# Patient Record
Sex: FEMALE | Race: WHITE | NOT HISPANIC OR LATINO | Employment: UNEMPLOYED | URBAN - NONMETROPOLITAN AREA
[De-identification: names, ages, dates, MRNs, and addresses within clinical notes are randomized per-mention and may not be internally consistent; named-entity substitution may affect disease eponyms.]

---

## 2021-02-05 ENCOUNTER — IMMUNIZATION (OUTPATIENT)
Dept: VACCINE CLINIC | Facility: HOSPITAL | Age: 81
End: 2021-02-05

## 2021-02-05 PROCEDURE — 91300 HC SARSCOV02 VAC 30MCG/0.3ML IM: CPT | Performed by: INTERNAL MEDICINE

## 2021-02-05 PROCEDURE — 0001A: CPT | Performed by: INTERNAL MEDICINE

## 2021-02-26 ENCOUNTER — IMMUNIZATION (OUTPATIENT)
Dept: VACCINE CLINIC | Facility: HOSPITAL | Age: 81
End: 2021-02-26

## 2021-02-26 PROCEDURE — 91300 HC SARSCOV02 VAC 30MCG/0.3ML IM: CPT | Performed by: INTERNAL MEDICINE

## 2021-02-26 PROCEDURE — 0002A: CPT | Performed by: INTERNAL MEDICINE

## 2022-01-19 ENCOUNTER — TRANSCRIBE ORDERS (OUTPATIENT)
Dept: ADMINISTRATIVE | Facility: HOSPITAL | Age: 82
End: 2022-01-19

## 2022-01-19 DIAGNOSIS — R10.32 ABDOMINAL PAIN, LEFT LOWER QUADRANT: Primary | ICD-10-CM

## 2022-01-27 ENCOUNTER — HOSPITAL ENCOUNTER (OUTPATIENT)
Dept: GENERAL RADIOLOGY | Facility: HOSPITAL | Age: 82
Discharge: HOME | End: 2022-01-27
Admitting: SURGERY

## 2022-01-27 DIAGNOSIS — R10.32 ABDOMINAL PAIN, LEFT LOWER QUADRANT: ICD-10-CM

## 2022-01-27 PROCEDURE — 74270 X-RAY XM COLON 1CNTRST STD: CPT

## 2022-01-27 PROCEDURE — 74270 X-RAY XM COLON 1CNTRST STD: CPT | Performed by: RADIOLOGY

## 2024-04-04 ENCOUNTER — APPOINTMENT (OUTPATIENT)
Dept: GENERAL RADIOLOGY | Facility: HOSPITAL | Age: 84
End: 2024-04-04
Payer: MEDICARE

## 2024-04-04 ENCOUNTER — HOSPITAL ENCOUNTER (EMERGENCY)
Facility: HOSPITAL | Age: 84
Discharge: HOME | End: 2024-04-05
Attending: EMERGENCY MEDICINE
Payer: MEDICARE

## 2024-04-04 DIAGNOSIS — F02.C0 SEVERE ALZHEIMER'S DEMENTIA, UNSPECIFIED TIMING OF DEMENTIA ONSET, UNSPECIFIED WHETHER BEHAVIORAL, PSYCHOTIC, OR MOOD DISTURBANCE OR ANXIETY: Primary | ICD-10-CM

## 2024-04-04 DIAGNOSIS — G30.9 SEVERE ALZHEIMER'S DEMENTIA, UNSPECIFIED TIMING OF DEMENTIA ONSET, UNSPECIFIED WHETHER BEHAVIORAL, PSYCHOTIC, OR MOOD DISTURBANCE OR ANXIETY: Primary | ICD-10-CM

## 2024-04-04 LAB
A-A DO2: 23.8 MMHG (ref 0–300)
ARTERIAL PATENCY WRIST A: ABNORMAL
ATMOSPHERIC PRESS: 724 MMHG
BASE EXCESS BLDA CALC-SCNC: 2.1 MMOL/L (ref 0–2)
BDY SITE: ABNORMAL
CO2 BLDA-SCNC: 27.4 MMOL/L (ref 22–33)
COHGB MFR BLD: 1.3 % (ref 0–5)
HCO3 BLDA-SCNC: 26.2 MMOL/L (ref 20–26)
HCT VFR BLD CALC: 44.4 % (ref 38–51)
HGB BLDA-MCNC: 14.5 G/DL (ref 13.5–17.5)
INHALED O2 CONCENTRATION: 21 %
Lab: ABNORMAL
METHGB BLD QL: 0.2 % (ref 0–3)
MODALITY: ABNORMAL
OXYHGB MFR BLDV: 95 % (ref 94–99)
PCO2 BLDA: 38.6 MM HG (ref 35–45)
PCO2 TEMP ADJ BLD: ABNORMAL MM[HG]
PH BLDA: 7.44 PH UNITS (ref 7.35–7.45)
PH, TEMP CORRECTED: ABNORMAL
PO2 BLDA: 74.8 MM HG (ref 83–108)
PO2 TEMP ADJ BLD: ABNORMAL MM[HG]
SAO2 % BLDCOA: 96.4 % (ref 94–99)
VENTILATOR MODE: ABNORMAL

## 2024-04-04 PROCEDURE — 82805 BLOOD GASES W/O2 SATURATION: CPT

## 2024-04-04 PROCEDURE — 83735 ASSAY OF MAGNESIUM: CPT | Performed by: EMERGENCY MEDICINE

## 2024-04-04 PROCEDURE — 93005 ELECTROCARDIOGRAM TRACING: CPT | Performed by: EMERGENCY MEDICINE

## 2024-04-04 PROCEDURE — 99285 EMERGENCY DEPT VISIT HI MDM: CPT

## 2024-04-04 PROCEDURE — 84484 ASSAY OF TROPONIN QUANT: CPT | Performed by: EMERGENCY MEDICINE

## 2024-04-04 PROCEDURE — 83605 ASSAY OF LACTIC ACID: CPT | Performed by: EMERGENCY MEDICINE

## 2024-04-04 PROCEDURE — 36600 WITHDRAWAL OF ARTERIAL BLOOD: CPT

## 2024-04-04 PROCEDURE — 71045 X-RAY EXAM CHEST 1 VIEW: CPT

## 2024-04-04 PROCEDURE — 85025 COMPLETE CBC W/AUTO DIFF WBC: CPT | Performed by: EMERGENCY MEDICINE

## 2024-04-04 PROCEDURE — 84145 PROCALCITONIN (PCT): CPT | Performed by: EMERGENCY MEDICINE

## 2024-04-04 PROCEDURE — 86140 C-REACTIVE PROTEIN: CPT | Performed by: EMERGENCY MEDICINE

## 2024-04-04 PROCEDURE — 82375 ASSAY CARBOXYHB QUANT: CPT

## 2024-04-04 PROCEDURE — 84443 ASSAY THYROID STIM HORMONE: CPT | Performed by: EMERGENCY MEDICINE

## 2024-04-04 PROCEDURE — 80053 COMPREHEN METABOLIC PANEL: CPT | Performed by: EMERGENCY MEDICINE

## 2024-04-04 PROCEDURE — 83050 HGB METHEMOGLOBIN QUAN: CPT

## 2024-04-04 RX ORDER — SODIUM CHLORIDE 0.9 % (FLUSH) 0.9 %
10 SYRINGE (ML) INJECTION AS NEEDED
Status: DISCONTINUED | OUTPATIENT
Start: 2024-04-04 | End: 2024-04-05 | Stop reason: HOSPADM

## 2024-04-05 ENCOUNTER — APPOINTMENT (OUTPATIENT)
Dept: CT IMAGING | Facility: HOSPITAL | Age: 84
End: 2024-04-05
Payer: MEDICARE

## 2024-04-05 VITALS
HEIGHT: 65 IN | WEIGHT: 130 LBS | BODY MASS INDEX: 21.66 KG/M2 | TEMPERATURE: 98.3 F | RESPIRATION RATE: 16 BRPM | HEART RATE: 75 BPM | SYSTOLIC BLOOD PRESSURE: 103 MMHG | OXYGEN SATURATION: 90 % | DIASTOLIC BLOOD PRESSURE: 62 MMHG

## 2024-04-05 LAB
ALBUMIN SERPL-MCNC: 4.3 G/DL (ref 3.5–5.2)
ALBUMIN/GLOB SERPL: 1.5 G/DL
ALP SERPL-CCNC: 91 U/L (ref 39–117)
ALT SERPL W P-5'-P-CCNC: 9 U/L (ref 1–33)
AMMONIA BLD-SCNC: 22 UMOL/L (ref 11–51)
ANION GAP SERPL CALCULATED.3IONS-SCNC: 15.4 MMOL/L (ref 5–15)
AST SERPL-CCNC: 15 U/L (ref 1–32)
BASOPHILS # BLD AUTO: 0.05 10*3/MM3 (ref 0–0.2)
BASOPHILS NFR BLD AUTO: 0.5 % (ref 0–1.5)
BILIRUB SERPL-MCNC: 0.7 MG/DL (ref 0–1.2)
BILIRUB UR QL STRIP: NEGATIVE
BUN SERPL-MCNC: 21 MG/DL (ref 8–23)
BUN/CREAT SERPL: 22.3 (ref 7–25)
CALCIUM SPEC-SCNC: 9.6 MG/DL (ref 8.6–10.5)
CHLORIDE SERPL-SCNC: 102 MMOL/L (ref 98–107)
CLARITY UR: CLEAR
CO2 SERPL-SCNC: 24.6 MMOL/L (ref 22–29)
COLOR UR: YELLOW
CREAT SERPL-MCNC: 0.94 MG/DL (ref 0.57–1)
CRP SERPL-MCNC: 0.86 MG/DL (ref 0–0.5)
D-LACTATE SERPL-SCNC: 1 MMOL/L (ref 0.5–2)
DEPRECATED RDW RBC AUTO: 43.7 FL (ref 37–54)
EGFRCR SERPLBLD CKD-EPI 2021: 60.7 ML/MIN/1.73
EOSINOPHIL # BLD AUTO: 0.06 10*3/MM3 (ref 0–0.4)
EOSINOPHIL NFR BLD AUTO: 0.6 % (ref 0.3–6.2)
ERYTHROCYTE [DISTWIDTH] IN BLOOD BY AUTOMATED COUNT: 12.7 % (ref 12.3–15.4)
FLUAV RNA RESP QL NAA+PROBE: NOT DETECTED
FLUBV RNA RESP QL NAA+PROBE: NOT DETECTED
GEN 5 2HR TROPONIN T REFLEX: 6 NG/L
GLOBULIN UR ELPH-MCNC: 2.8 GM/DL
GLUCOSE BLDC GLUCOMTR-MCNC: 114 MG/DL (ref 70–130)
GLUCOSE SERPL-MCNC: 112 MG/DL (ref 65–99)
GLUCOSE UR STRIP-MCNC: NEGATIVE MG/DL
HCT VFR BLD AUTO: 43.5 % (ref 34–46.6)
HGB BLD-MCNC: 14.6 G/DL (ref 12–15.9)
HGB UR QL STRIP.AUTO: NEGATIVE
HOLD SPECIMEN: NORMAL
HOLD SPECIMEN: NORMAL
IMM GRANULOCYTES # BLD AUTO: 0.02 10*3/MM3 (ref 0–0.05)
IMM GRANULOCYTES NFR BLD AUTO: 0.2 % (ref 0–0.5)
KETONES UR QL STRIP: ABNORMAL
LEUKOCYTE ESTERASE UR QL STRIP.AUTO: NEGATIVE
LYMPHOCYTES # BLD AUTO: 1.94 10*3/MM3 (ref 0.7–3.1)
LYMPHOCYTES NFR BLD AUTO: 19.5 % (ref 19.6–45.3)
MAGNESIUM SERPL-MCNC: 2.4 MG/DL (ref 1.6–2.4)
MCH RBC QN AUTO: 31.7 PG (ref 26.6–33)
MCHC RBC AUTO-ENTMCNC: 33.6 G/DL (ref 31.5–35.7)
MCV RBC AUTO: 94.4 FL (ref 79–97)
MONOCYTES # BLD AUTO: 0.78 10*3/MM3 (ref 0.1–0.9)
MONOCYTES NFR BLD AUTO: 7.8 % (ref 5–12)
NEUTROPHILS NFR BLD AUTO: 7.09 10*3/MM3 (ref 1.7–7)
NEUTROPHILS NFR BLD AUTO: 71.4 % (ref 42.7–76)
NITRITE UR QL STRIP: NEGATIVE
NRBC BLD AUTO-RTO: 0 /100 WBC (ref 0–0.2)
PH UR STRIP.AUTO: 6 [PH] (ref 5–8)
PLATELET # BLD AUTO: 238 10*3/MM3 (ref 140–450)
PMV BLD AUTO: 8.7 FL (ref 6–12)
POTASSIUM SERPL-SCNC: 3.6 MMOL/L (ref 3.5–5.2)
PROCALCITONIN SERPL-MCNC: 0.06 NG/ML (ref 0–0.25)
PROT SERPL-MCNC: 7.1 G/DL (ref 6–8.5)
PROT UR QL STRIP: NEGATIVE
QT INTERVAL: 378 MS
QTC INTERVAL: 454 MS
RBC # BLD AUTO: 4.61 10*6/MM3 (ref 3.77–5.28)
SARS-COV-2 RNA RESP QL NAA+PROBE: NOT DETECTED
SODIUM SERPL-SCNC: 142 MMOL/L (ref 136–145)
SP GR UR STRIP: >1.03 (ref 1–1.03)
TROPONIN T DELTA: -1 NG/L
TROPONIN T SERPL HS-MCNC: 7 NG/L
TSH SERPL DL<=0.05 MIU/L-ACNC: 2.42 UIU/ML (ref 0.27–4.2)
UROBILINOGEN UR QL STRIP: ABNORMAL
WBC NRBC COR # BLD AUTO: 9.94 10*3/MM3 (ref 3.4–10.8)
WHOLE BLOOD HOLD COAG: NORMAL
WHOLE BLOOD HOLD SPECIMEN: NORMAL

## 2024-04-05 PROCEDURE — 93005 ELECTROCARDIOGRAM TRACING: CPT

## 2024-04-05 PROCEDURE — 71045 X-RAY EXAM CHEST 1 VIEW: CPT | Performed by: RADIOLOGY

## 2024-04-05 PROCEDURE — 82948 REAGENT STRIP/BLOOD GLUCOSE: CPT

## 2024-04-05 PROCEDURE — 70498 CT ANGIOGRAPHY NECK: CPT

## 2024-04-05 PROCEDURE — 82140 ASSAY OF AMMONIA: CPT | Performed by: EMERGENCY MEDICINE

## 2024-04-05 PROCEDURE — 87636 SARSCOV2 & INF A&B AMP PRB: CPT | Performed by: EMERGENCY MEDICINE

## 2024-04-05 PROCEDURE — P9612 CATHETERIZE FOR URINE SPEC: HCPCS

## 2024-04-05 PROCEDURE — 93010 ELECTROCARDIOGRAM REPORT: CPT | Performed by: SPECIALIST

## 2024-04-05 PROCEDURE — 87040 BLOOD CULTURE FOR BACTERIA: CPT | Performed by: EMERGENCY MEDICINE

## 2024-04-05 PROCEDURE — 36415 COLL VENOUS BLD VENIPUNCTURE: CPT

## 2024-04-05 PROCEDURE — 25810000003 SODIUM CHLORIDE 0.9 % SOLUTION: Performed by: EMERGENCY MEDICINE

## 2024-04-05 PROCEDURE — 84484 ASSAY OF TROPONIN QUANT: CPT | Performed by: EMERGENCY MEDICINE

## 2024-04-05 PROCEDURE — 70498 CT ANGIOGRAPHY NECK: CPT | Performed by: RADIOLOGY

## 2024-04-05 PROCEDURE — 70450 CT HEAD/BRAIN W/O DYE: CPT

## 2024-04-05 PROCEDURE — 25510000001 IOPAMIDOL PER 1 ML: Performed by: EMERGENCY MEDICINE

## 2024-04-05 PROCEDURE — 70496 CT ANGIOGRAPHY HEAD: CPT | Performed by: RADIOLOGY

## 2024-04-05 PROCEDURE — 0042T HC CT CEREBRAL PERFUSION W/WO CONTRAST: CPT

## 2024-04-05 PROCEDURE — 70496 CT ANGIOGRAPHY HEAD: CPT

## 2024-04-05 PROCEDURE — 0042T CT CEREBRAL PERFUSION W WO CONTRAST: CPT | Performed by: RADIOLOGY

## 2024-04-05 PROCEDURE — 81003 URINALYSIS AUTO W/O SCOPE: CPT | Performed by: EMERGENCY MEDICINE

## 2024-04-05 RX ADMIN — SODIUM CHLORIDE 1000 ML: 9 INJECTION, SOLUTION INTRAVENOUS at 03:49

## 2024-04-05 RX ADMIN — IOPAMIDOL 70 ML: 755 INJECTION, SOLUTION INTRAVENOUS at 02:05

## 2024-04-05 RX ADMIN — IOPAMIDOL 70 ML: 755 INJECTION, SOLUTION INTRAVENOUS at 02:04

## 2024-04-05 NOTE — ED PROVIDER NOTES
Subjective   History of Present Illness  Patient is an 83-year-old female who arrives by EMS, accompanied by her .  He states that she has severe Alzheimer's dementia, and that today she has been much more confused than usual.  By the time they arrived here the patient advises that she is almost completely back to her baseline mental status, which is still very confused.  He states that for a time today she would not talk to him or communicate with him in any way.  She did not lose consciousness.  She has not voiced any complaints to the .  He states that several years ago she was in a clinical trial for Alzheimer's disease at the [facility], she seemed to do well with this for a while but stopped benefiting from it and so she stopped participating in the trial.  Then about 1 to 1-1/2 years ago the  stopped giving her her medications, which consisted of Namenda and Exelon patch; he states that it became such a fight on a daily basis to get her to take her medications that he decided to stop doing so.  He denies any other symptoms or other complaints.      Review of Systems   All other systems reviewed and are negative.      No past medical history on file.    Allergies   Allergen Reactions    Sulfa Antibiotics Hives       No past surgical history on file.    No family history on file.    Social History     Socioeconomic History    Marital status:            Objective   Physical Exam  Vitals and nursing note reviewed.   Constitutional:       General: She is not in acute distress.     Appearance: Normal appearance. She is well-developed. She is not toxic-appearing or diaphoretic.      Comments: Elderly female, awake and alert.  She appears to be very demented.  She is oriented to her first name only.  She does not otherwise answer questions appropriately.  She is able to tell me that her name is Crista.   HENT:      Head: Normocephalic and atraumatic.   Eyes:      General: No  scleral icterus.     Pupils: Pupils are equal, round, and reactive to light.   Neck:      Trachea: No tracheal deviation.   Cardiovascular:      Rate and Rhythm: Normal rate and regular rhythm.   Pulmonary:      Effort: Pulmonary effort is normal. No respiratory distress.      Breath sounds: Normal breath sounds.   Chest:      Chest wall: No tenderness.   Abdominal:      General: Bowel sounds are normal.      Palpations: Abdomen is soft.      Tenderness: There is no abdominal tenderness. There is no guarding or rebound.   Musculoskeletal:         General: No tenderness. Normal range of motion.      Cervical back: Normal range of motion and neck supple. No rigidity or tenderness.      Right lower leg: No edema.      Left lower leg: No edema.   Skin:     General: Skin is warm and dry.      Capillary Refill: Capillary refill takes less than 2 seconds.      Coloration: Skin is not pale.   Neurological:      General: No focal deficit present.      Mental Status: She is alert.      GCS: GCS eye subscore is 4. GCS verbal subscore is 5. GCS motor subscore is 6.      Motor: No abnormal muscle tone.      Comments: Patient is not able to cooperate with detailed neurological examination, but grossly nonfocal.   Psychiatric:      Comments: Patient appears very demented         Procedures  CT CEREBRAL PERFUSION WITH & WITHOUT CONTRAST   Final Result       1.  No evidence of ischemia.   2.  Normal brain perfusion scan.       This report was finalized on 4/5/2024 2:43 AM by Eamon Omalley MD.          CT Angiogram Head   Final Result       1.  Normal CTA examination of the neck.   2.  Normal CTA examination of the brain with features of mild stenoses   identified at the distal right M1 MCA branch segments.   3.  No aneurysm or vascular malformation.   4.  Mild left dominant vertebral artery.   5.  No dissection.   6.  No large vessel occlusion.               This report was finalized on 4/5/2024 2:48 AM by Eamon Omalley MD.           CT Angiogram Carotids   Final Result       1.  Normal CTA examination of the neck.   2.  Normal CTA examination of the brain with features of mild stenoses   identified at the distal right M1 MCA branch segments.   3.  No aneurysm or vascular malformation.   4.  Mild left dominant vertebral artery.   5.  No dissection.   6.  No large vessel occlusion.               This report was finalized on 4/5/2024 2:48 AM by Eamon Omalley MD.          CT Head Without Contrast   Final Result       1.  Moderate generalized brain volume loss.   2.  No acute intracranial hemorrhage, mass, infarct, or edema.   3.  Mild mucosal thickening in the paranasal sinuses.   4.  Left-sided mastoiditis.   5.  No fracture or foreign body.       This report was finalized on 4/5/2024 1:54 AM by Eamon Omalley MD.          XR Chest 1 View   Final Result       1.  Normal heart size.   2.  No lobar consolidation or edema.    3.  No pleural effusion or pneumothorax   4.  Mild hypoinflated lungs.       This report was finalized on 4/5/2024 12:49 AM by Eamon Omalley MD.            Results for orders placed or performed during the hospital encounter of 04/04/24   COVID-19 and FLU A/B PCR, 1 HR TAT - Swab, Nasopharynx    Specimen: Nasopharynx; Swab   Result Value Ref Range    COVID19 Not Detected Not Detected - Ref. Range    Influenza A PCR Not Detected Not Detected    Influenza B PCR Not Detected Not Detected   Ammonia    Specimen: Arm, Left; Blood   Result Value Ref Range    Ammonia 22 11 - 51 umol/L   Comprehensive Metabolic Panel    Specimen: Arm, Left; Blood   Result Value Ref Range    Glucose 112 (H) 65 - 99 mg/dL    BUN 21 8 - 23 mg/dL    Creatinine 0.94 0.57 - 1.00 mg/dL    Sodium 142 136 - 145 mmol/L    Potassium 3.6 3.5 - 5.2 mmol/L    Chloride 102 98 - 107 mmol/L    CO2 24.6 22.0 - 29.0 mmol/L    Calcium 9.6 8.6 - 10.5 mg/dL    Total Protein 7.1 6.0 - 8.5 g/dL    Albumin 4.3 3.5 - 5.2 g/dL    ALT (SGPT) 9 1 - 33 U/L    AST (SGOT) 15 1 - 32  U/L    Alkaline Phosphatase 91 39 - 117 U/L    Total Bilirubin 0.7 0.0 - 1.2 mg/dL    Globulin 2.8 gm/dL    A/G Ratio 1.5 g/dL    BUN/Creatinine Ratio 22.3 7.0 - 25.0    Anion Gap 15.4 (H) 5.0 - 15.0 mmol/L    eGFR 60.7 >60.0 mL/min/1.73   Urinalysis With Culture If Indicated - Urine, Catheter    Specimen: Urine, Catheter   Result Value Ref Range    Color, UA Yellow Yellow, Straw    Appearance, UA Clear Clear    pH, UA 6.0 5.0 - 8.0    Specific Gravity, UA >1.030 (H) 1.005 - 1.030    Glucose, UA Negative Negative    Ketones, UA Trace (A) Negative    Bilirubin, UA Negative Negative    Blood, UA Negative Negative    Protein, UA Negative Negative    Leuk Esterase, UA Negative Negative    Nitrite, UA Negative Negative    Urobilinogen, UA 0.2 E.U./dL 0.2 - 1.0 E.U./dL   High Sensitivity Troponin T    Specimen: Arm, Left; Blood   Result Value Ref Range    HS Troponin T 7 <14 ng/L   Lactic Acid, Plasma    Specimen: Arm, Left; Blood   Result Value Ref Range    Lactate 1.0 0.5 - 2.0 mmol/L   Procalcitonin    Specimen: Arm, Left; Blood   Result Value Ref Range    Procalcitonin 0.06 0.00 - 0.25 ng/mL   C-reactive Protein    Specimen: Arm, Left; Blood   Result Value Ref Range    C-Reactive Protein 0.86 (H) 0.00 - 0.50 mg/dL   TSH    Specimen: Arm, Left; Blood   Result Value Ref Range    TSH 2.420 0.270 - 4.200 uIU/mL   Magnesium    Specimen: Arm, Left; Blood   Result Value Ref Range    Magnesium 2.4 1.6 - 2.4 mg/dL   CBC Auto Differential    Specimen: Arm, Left; Blood   Result Value Ref Range    WBC 9.94 3.40 - 10.80 10*3/mm3    RBC 4.61 3.77 - 5.28 10*6/mm3    Hemoglobin 14.6 12.0 - 15.9 g/dL    Hematocrit 43.5 34.0 - 46.6 %    MCV 94.4 79.0 - 97.0 fL    MCH 31.7 26.6 - 33.0 pg    MCHC 33.6 31.5 - 35.7 g/dL    RDW 12.7 12.3 - 15.4 %    RDW-SD 43.7 37.0 - 54.0 fl    MPV 8.7 6.0 - 12.0 fL    Platelets 238 140 - 450 10*3/mm3    Neutrophil % 71.4 42.7 - 76.0 %    Lymphocyte % 19.5 (L) 19.6 - 45.3 %    Monocyte % 7.8 5.0 - 12.0 %     Eosinophil % 0.6 0.3 - 6.2 %    Basophil % 0.5 0.0 - 1.5 %    Immature Grans % 0.2 0.0 - 0.5 %    Neutrophils, Absolute 7.09 (H) 1.70 - 7.00 10*3/mm3    Lymphocytes, Absolute 1.94 0.70 - 3.10 10*3/mm3    Monocytes, Absolute 0.78 0.10 - 0.90 10*3/mm3    Eosinophils, Absolute 0.06 0.00 - 0.40 10*3/mm3    Basophils, Absolute 0.05 0.00 - 0.20 10*3/mm3    Immature Grans, Absolute 0.02 0.00 - 0.05 10*3/mm3    nRBC 0.0 0.0 - 0.2 /100 WBC   Blood Gas, Arterial With Co-Ox    Specimen: Arterial Blood   Result Value Ref Range    Site Left Brachial     Samy's Test N/A     pH, Arterial 7.441 7.350 - 7.450 pH units    pCO2, Arterial 38.6 35.0 - 45.0 mm Hg    pO2, Arterial 74.8 (L) 83.0 - 108.0 mm Hg    HCO3, Arterial 26.2 (H) 20.0 - 26.0 mmol/L    Base Excess, Arterial 2.1 (H) 0.0 - 2.0 mmol/L    O2 Saturation, Arterial 96.4 94.0 - 99.0 %    Hemoglobin, Blood Gas 14.5 13.5 - 17.5 g/dL    Hematocrit, Blood Gas 44.4 38.0 - 51.0 %    Oxyhemoglobin 95.0 94 - 99 %    Methemoglobin 0.20 0.00 - 3.00 %    Carboxyhemoglobin 1.3 0 - 5 %    A-a DO2 23.8 0.0 - 300.0 mmHg    CO2 Content 27.4 22 - 33 mmol/L    Barometric Pressure for Blood Gas 724 mmHg    Modality Room Air     FIO2 21 %    Ventilator Mode NA     Collected by [id number]     pH, Temp Corrected      pCO2, Temperature Corrected      pO2, Temperature Corrected     High Sensitivity Troponin T 2Hr    Specimen: Arm, Left; Blood   Result Value Ref Range    HS Troponin T 6 <14 ng/L    Troponin T Delta -1 >=-4 - <+4 ng/L   POC Glucose Once    Specimen: Blood   Result Value Ref Range    Glucose 114 70 - 130 mg/dL   ECG 12 Lead QT Measurement   Result Value Ref Range    QT Interval 378 ms    QTC Interval 454 ms   Green Top (Gel)   Result Value Ref Range    Extra Tube Hold for add-ons.    Lavender Top   Result Value Ref Range    Extra Tube hold for add-on    Gold Top - SST   Result Value Ref Range    Extra Tube Hold for add-ons.    Light Blue Top   Result Value Ref Range    Extra Tube Hold  for add-ons.                 ED Course  ED Course as of 04/05/24 0513   Fri Apr 05, 2024 0237 EKG shows sinus rhythm, rate 87.  MT interval 174, QRS duration 64, QTc 454 ms.  Baseline artifact.  Occasional PAC.  Nonspecific ST-T changes.  No evidence for STEMI. [CM]   0429 Patient's emergency department stay has been uncomplicated.  She has shown no signs of acute distress.   and I discussed her test results and her plan of care.  He voices understanding and agreement.  She is discharged home in care of . [CM]      ED Course User Index  [CM] Shahzad Lisa MD                                             Medical Decision Making  Problems Addressed:  Severe Alzheimer's dementia, unspecified timing of dementia onset, unspecified whether behavioral, psychotic, or mood disturbance or anxiety: chronic illness or injury    Amount and/or Complexity of Data Reviewed  Labs: ordered. Decision-making details documented in ED Course.  Radiology: ordered. Decision-making details documented in ED Course.  ECG/medicine tests: ordered. Decision-making details documented in ED Course.    Risk  Prescription drug management.        Final diagnoses:   Severe Alzheimer's dementia, unspecified timing of dementia onset, unspecified whether behavioral, psychotic, or mood disturbance or anxiety       ED Disposition  ED Disposition       ED Disposition   Discharge    Condition   Stable    Comment   --               Abby Mckenzie, DO  [address] [facility] [address]  [phone]    Go to   1 to 2 days    [facility] EMERGENCY DEPARTMENT  [address] [place] [id number] [phone]  Go to   If symptoms worsen         Medication List      No changes were made to your prescriptions during this visit.       Please note that portions of this note were completed with a voice recognition program.        Shahzad Lisa MD  04/05/24 0550

## 2024-04-05 NOTE — DISCHARGE INSTRUCTIONS
Home in care of .  Follow-up with your primary care provider in 1 to 2 days.  Return to the emergency department right away if symptoms worsen/any problems.

## 2024-04-10 LAB
BACTERIA SPEC AEROBE CULT: NORMAL
BACTERIA SPEC AEROBE CULT: NORMAL

## 2025-02-22 ENCOUNTER — APPOINTMENT (OUTPATIENT)
Dept: GENERAL RADIOLOGY | Facility: HOSPITAL | Age: 85
DRG: 521 | End: 2025-02-22
Payer: MEDICARE

## 2025-02-22 ENCOUNTER — HOSPITAL ENCOUNTER (INPATIENT)
Facility: HOSPITAL | Age: 85
LOS: 12 days | Discharge: HOME | DRG: 521 | End: 2025-03-07
Attending: STUDENT IN AN ORGANIZED HEALTH CARE EDUCATION/TRAINING PROGRAM | Admitting: INTERNAL MEDICINE
Payer: MEDICARE

## 2025-02-22 ENCOUNTER — APPOINTMENT (OUTPATIENT)
Dept: CT IMAGING | Facility: HOSPITAL | Age: 85
DRG: 521 | End: 2025-02-22
Payer: MEDICARE

## 2025-02-22 DIAGNOSIS — S72.002A CLOSED FRACTURE OF LEFT HIP, INITIAL ENCOUNTER: ICD-10-CM

## 2025-02-22 DIAGNOSIS — S72.002A CLOSED FRACTURE OF NECK OF LEFT FEMUR, INITIAL ENCOUNTER: Primary | ICD-10-CM

## 2025-02-22 PROCEDURE — 99285 EMERGENCY DEPT VISIT HI MDM: CPT

## 2025-02-22 PROCEDURE — 73552 X-RAY EXAM OF FEMUR 2/>: CPT

## 2025-02-22 PROCEDURE — 73502 X-RAY EXAM HIP UNI 2-3 VIEWS: CPT

## 2025-02-22 PROCEDURE — 70450 CT HEAD/BRAIN W/O DYE: CPT

## 2025-02-22 PROCEDURE — 73590 X-RAY EXAM OF LOWER LEG: CPT

## 2025-02-22 PROCEDURE — 72125 CT NECK SPINE W/O DYE: CPT

## 2025-02-23 ENCOUNTER — APPOINTMENT (OUTPATIENT)
Dept: GENERAL RADIOLOGY | Facility: HOSPITAL | Age: 85
DRG: 521 | End: 2025-02-23
Payer: MEDICARE

## 2025-02-23 ENCOUNTER — ANESTHESIA EVENT (OUTPATIENT)
Dept: PERIOP | Facility: HOSPITAL | Age: 85
End: 2025-02-23
Payer: MEDICARE

## 2025-02-23 ENCOUNTER — ANESTHESIA (OUTPATIENT)
Dept: PERIOP | Facility: HOSPITAL | Age: 85
End: 2025-02-23
Payer: MEDICARE

## 2025-02-23 PROBLEM — S72.009A HIP FRACTURE: Status: ACTIVE | Noted: 2025-02-23

## 2025-02-23 LAB
A-A DO2: 42.1 MMHG (ref 0–300)
ABO GROUP BLD: NORMAL
ABO GROUP BLD: NORMAL
ALBUMIN SERPL-MCNC: 3.3 G/DL (ref 3.5–5.2)
ALBUMIN SERPL-MCNC: 4.1 G/DL (ref 3.5–5.2)
ALBUMIN SERPL-MCNC: 4.1 G/DL (ref 3.5–5.2)
ALBUMIN/GLOB SERPL: 1.4 G/DL
ALBUMIN/GLOB SERPL: 1.6 G/DL
ALBUMIN/GLOB SERPL: 1.9 G/DL
ALP SERPL-CCNC: 100 U/L (ref 39–117)
ALP SERPL-CCNC: 70 U/L (ref 39–117)
ALP SERPL-CCNC: 93 U/L (ref 39–117)
ALT SERPL W P-5'-P-CCNC: 11 U/L (ref 1–33)
ALT SERPL W P-5'-P-CCNC: 12 U/L (ref 1–33)
ALT SERPL W P-5'-P-CCNC: 12 U/L (ref 1–33)
ANION GAP SERPL CALCULATED.3IONS-SCNC: 11.1 MMOL/L (ref 5–15)
ANION GAP SERPL CALCULATED.3IONS-SCNC: 11.1 MMOL/L (ref 5–15)
ANION GAP SERPL CALCULATED.3IONS-SCNC: 9.5 MMOL/L (ref 5–15)
ARTERIAL PATENCY WRIST A: POSITIVE
AST SERPL-CCNC: 16 U/L (ref 1–32)
AST SERPL-CCNC: 17 U/L (ref 1–32)
AST SERPL-CCNC: 18 U/L (ref 1–32)
ATMOSPHERIC PRESS: 728 MMHG
B PARAPERT DNA SPEC QL NAA+PROBE: NOT DETECTED
B PERT DNA SPEC QL NAA+PROBE: NOT DETECTED
BACTERIA UR QL AUTO: ABNORMAL /HPF
BASE EXCESS BLDA CALC-SCNC: -0.2 MMOL/L (ref 0–2)
BASOPHILS # BLD AUTO: 0.04 10*3/MM3 (ref 0–0.2)
BASOPHILS # BLD AUTO: 0.06 10*3/MM3 (ref 0–0.2)
BASOPHILS # BLD MANUAL: 0.17 10*3/MM3 (ref 0–0.2)
BASOPHILS NFR BLD AUTO: 0.2 % (ref 0–1.5)
BASOPHILS NFR BLD AUTO: 0.4 % (ref 0–1.5)
BASOPHILS NFR BLD MANUAL: 1 % (ref 0–1.5)
BDY SITE: ABNORMAL
BILIRUB SERPL-MCNC: 0.4 MG/DL (ref 0–1.2)
BILIRUB UR QL STRIP: NEGATIVE
BLD GP AB SCN SERPL QL: NEGATIVE
BUN SERPL-MCNC: 12 MG/DL (ref 8–23)
BUN SERPL-MCNC: 12 MG/DL (ref 8–23)
BUN SERPL-MCNC: 14 MG/DL (ref 8–23)
BUN/CREAT SERPL: 14.8 (ref 7–25)
BUN/CREAT SERPL: 15.6 (ref 7–25)
BUN/CREAT SERPL: 17.3 (ref 7–25)
C PNEUM DNA NPH QL NAA+NON-PROBE: NOT DETECTED
CALCIUM SPEC-SCNC: 7.9 MG/DL (ref 8.6–10.5)
CALCIUM SPEC-SCNC: 8.7 MG/DL (ref 8.6–10.5)
CALCIUM SPEC-SCNC: 9.1 MG/DL (ref 8.6–10.5)
CHLORIDE SERPL-SCNC: 101 MMOL/L (ref 98–107)
CHLORIDE SERPL-SCNC: 102 MMOL/L (ref 98–107)
CHLORIDE SERPL-SCNC: 103 MMOL/L (ref 98–107)
CLARITY UR: CLEAR
CO2 BLDA-SCNC: 25.8 MMOL/L (ref 22–33)
CO2 SERPL-SCNC: 22.9 MMOL/L (ref 22–29)
CO2 SERPL-SCNC: 23.9 MMOL/L (ref 22–29)
CO2 SERPL-SCNC: 25.5 MMOL/L (ref 22–29)
COHGB MFR BLD: 2.1 % (ref 0–5)
COLOR UR: YELLOW
CREAT SERPL-MCNC: 0.77 MG/DL (ref 0.57–1)
CREAT SERPL-MCNC: 0.81 MG/DL (ref 0.57–1)
CREAT SERPL-MCNC: 0.81 MG/DL (ref 0.57–1)
DEPRECATED RDW RBC AUTO: 44.5 FL (ref 37–54)
DEPRECATED RDW RBC AUTO: 45.2 FL (ref 37–54)
DEPRECATED RDW RBC AUTO: 47 FL (ref 37–54)
EGFRCR SERPLBLD CKD-EPI 2021: 72.1 ML/MIN/1.73
EGFRCR SERPLBLD CKD-EPI 2021: 72.1 ML/MIN/1.73
EGFRCR SERPLBLD CKD-EPI 2021: 76.6 ML/MIN/1.73
EOSINOPHIL # BLD AUTO: 0 10*3/MM3 (ref 0–0.4)
EOSINOPHIL # BLD AUTO: 0 10*3/MM3 (ref 0–0.4)
EOSINOPHIL NFR BLD AUTO: 0 % (ref 0.3–6.2)
EOSINOPHIL NFR BLD AUTO: 0 % (ref 0.3–6.2)
ERYTHROCYTE [DISTWIDTH] IN BLOOD BY AUTOMATED COUNT: 12.6 % (ref 12.3–15.4)
ERYTHROCYTE [DISTWIDTH] IN BLOOD BY AUTOMATED COUNT: 12.6 % (ref 12.3–15.4)
ERYTHROCYTE [DISTWIDTH] IN BLOOD BY AUTOMATED COUNT: 13.1 % (ref 12.3–15.4)
FLUAV SUBTYP SPEC NAA+PROBE: NOT DETECTED
FLUBV RNA ISLT QL NAA+PROBE: NOT DETECTED
GLOBULIN UR ELPH-MCNC: 1.7 GM/DL
GLOBULIN UR ELPH-MCNC: 2.5 GM/DL
GLOBULIN UR ELPH-MCNC: 2.9 GM/DL
GLUCOSE SERPL-MCNC: 118 MG/DL (ref 65–99)
GLUCOSE SERPL-MCNC: 147 MG/DL (ref 65–99)
GLUCOSE SERPL-MCNC: 171 MG/DL (ref 65–99)
GLUCOSE UR STRIP-MCNC: NEGATIVE MG/DL
HADV DNA SPEC NAA+PROBE: NOT DETECTED
HCO3 BLDA-SCNC: 24.6 MMOL/L (ref 20–26)
HCOV 229E RNA SPEC QL NAA+PROBE: NOT DETECTED
HCOV HKU1 RNA SPEC QL NAA+PROBE: NOT DETECTED
HCOV NL63 RNA SPEC QL NAA+PROBE: NOT DETECTED
HCOV OC43 RNA SPEC QL NAA+PROBE: NOT DETECTED
HCT VFR BLD AUTO: 34.6 % (ref 34–46.6)
HCT VFR BLD AUTO: 42.5 % (ref 34–46.6)
HCT VFR BLD AUTO: 43.7 % (ref 34–46.6)
HCT VFR BLD CALC: 32.8 % (ref 38–51)
HGB BLD-MCNC: 11.4 G/DL (ref 12–15.9)
HGB BLD-MCNC: 13.8 G/DL (ref 12–15.9)
HGB BLD-MCNC: 14.2 G/DL (ref 12–15.9)
HGB BLDA-MCNC: 10.7 G/DL (ref 13.5–17.5)
HGB UR QL STRIP.AUTO: NEGATIVE
HMPV RNA NPH QL NAA+NON-PROBE: NOT DETECTED
HOLD SPECIMEN: NORMAL
HPIV1 RNA ISLT QL NAA+PROBE: NOT DETECTED
HPIV2 RNA SPEC QL NAA+PROBE: NOT DETECTED
HPIV3 RNA NPH QL NAA+PROBE: NOT DETECTED
HPIV4 P GENE NPH QL NAA+PROBE: NOT DETECTED
HYALINE CASTS UR QL AUTO: ABNORMAL /LPF
IMM GRANULOCYTES # BLD AUTO: 0.07 10*3/MM3 (ref 0–0.05)
IMM GRANULOCYTES # BLD AUTO: 0.08 10*3/MM3 (ref 0–0.05)
IMM GRANULOCYTES NFR BLD AUTO: 0.4 % (ref 0–0.5)
IMM GRANULOCYTES NFR BLD AUTO: 0.6 % (ref 0–0.5)
INHALED O2 CONCENTRATION: 21 %
INR PPP: 0.97 (ref 0.9–1.1)
KETONES UR QL STRIP: NEGATIVE
LEUKOCYTE ESTERASE UR QL STRIP.AUTO: ABNORMAL
LYMPHOCYTES # BLD AUTO: 0.83 10*3/MM3 (ref 0.7–3.1)
LYMPHOCYTES # BLD AUTO: 1.21 10*3/MM3 (ref 0.7–3.1)
LYMPHOCYTES # BLD MANUAL: 1.18 10*3/MM3 (ref 0.7–3.1)
LYMPHOCYTES NFR BLD AUTO: 5.1 % (ref 19.6–45.3)
LYMPHOCYTES NFR BLD AUTO: 8.6 % (ref 19.6–45.3)
LYMPHOCYTES NFR BLD MANUAL: 5 % (ref 5–12)
Lab: ABNORMAL
M PNEUMO IGG SER IA-ACNC: NOT DETECTED
MAGNESIUM SERPL-MCNC: 2.3 MG/DL (ref 1.6–2.4)
MCH RBC QN AUTO: 30.9 PG (ref 26.6–33)
MCH RBC QN AUTO: 31.3 PG (ref 26.6–33)
MCH RBC QN AUTO: 31.9 PG (ref 26.6–33)
MCHC RBC AUTO-ENTMCNC: 32.5 G/DL (ref 31.5–35.7)
MCHC RBC AUTO-ENTMCNC: 32.5 G/DL (ref 31.5–35.7)
MCHC RBC AUTO-ENTMCNC: 32.9 G/DL (ref 31.5–35.7)
MCV RBC AUTO: 95.2 FL (ref 79–97)
MCV RBC AUTO: 96.4 FL (ref 79–97)
MCV RBC AUTO: 96.9 FL (ref 79–97)
METHGB BLD QL: 0.5 % (ref 0–3)
MODALITY: ABNORMAL
MONOCYTES # BLD AUTO: 0.71 10*3/MM3 (ref 0.1–0.9)
MONOCYTES # BLD AUTO: 0.74 10*3/MM3 (ref 0.1–0.9)
MONOCYTES # BLD: 0.84 10*3/MM3 (ref 0.1–0.9)
MONOCYTES NFR BLD AUTO: 4.4 % (ref 5–12)
MONOCYTES NFR BLD AUTO: 5.2 % (ref 5–12)
MYELOCYTES NFR BLD MANUAL: 1 % (ref 0–0)
NEUTROPHILS # BLD AUTO: 14.51 10*3/MM3 (ref 1.7–7)
NEUTROPHILS NFR BLD AUTO: 12.06 10*3/MM3 (ref 1.7–7)
NEUTROPHILS NFR BLD AUTO: 14.62 10*3/MM3 (ref 1.7–7)
NEUTROPHILS NFR BLD AUTO: 85.2 % (ref 42.7–76)
NEUTROPHILS NFR BLD AUTO: 89.9 % (ref 42.7–76)
NEUTROPHILS NFR BLD MANUAL: 86 % (ref 42.7–76)
NITRITE UR QL STRIP: POSITIVE
NRBC BLD AUTO-RTO: 0 /100 WBC (ref 0–0.2)
NRBC BLD AUTO-RTO: 0 /100 WBC (ref 0–0.2)
OXYHGB MFR BLDV: 89 % (ref 94–99)
PCO2 BLDA: 40 MM HG (ref 35–45)
PCO2 TEMP ADJ BLD: ABNORMAL MM[HG]
PH BLDA: 7.4 PH UNITS (ref 7.35–7.45)
PH UR STRIP.AUTO: 8 [PH] (ref 5–8)
PH, TEMP CORRECTED: ABNORMAL
PLATELET # BLD AUTO: 190 10*3/MM3 (ref 140–450)
PLATELET # BLD AUTO: 247 10*3/MM3 (ref 140–450)
PLATELET # BLD AUTO: 258 10*3/MM3 (ref 140–450)
PMV BLD AUTO: 8.7 FL (ref 6–12)
PMV BLD AUTO: 8.7 FL (ref 6–12)
PMV BLD AUTO: 9.6 FL (ref 6–12)
PO2 BLDA: 55.8 MM HG (ref 83–108)
PO2 TEMP ADJ BLD: ABNORMAL MM[HG]
POTASSIUM SERPL-SCNC: 3.8 MMOL/L (ref 3.5–5.2)
POTASSIUM SERPL-SCNC: 3.9 MMOL/L (ref 3.5–5.2)
POTASSIUM SERPL-SCNC: 4.3 MMOL/L (ref 3.5–5.2)
PROT SERPL-MCNC: 5 G/DL (ref 6–8.5)
PROT SERPL-MCNC: 6.6 G/DL (ref 6–8.5)
PROT SERPL-MCNC: 7 G/DL (ref 6–8.5)
PROT UR QL STRIP: NEGATIVE
PROTHROMBIN TIME: 13 SECONDS (ref 11.6–15.1)
QT INTERVAL: 352 MS
QTC INTERVAL: 454 MS
RBC # BLD AUTO: 3.57 10*6/MM3 (ref 3.77–5.28)
RBC # BLD AUTO: 4.41 10*6/MM3 (ref 3.77–5.28)
RBC # BLD AUTO: 4.59 10*6/MM3 (ref 3.77–5.28)
RBC # UR STRIP: ABNORMAL /HPF
RBC MORPH BLD: NORMAL
REF LAB TEST METHOD: ABNORMAL
RH BLD: POSITIVE
RH BLD: POSITIVE
RHINOVIRUS RNA SPEC NAA+PROBE: NOT DETECTED
RSV RNA NPH QL NAA+NON-PROBE: NOT DETECTED
SAO2 % BLDCOA: 91.4 % (ref 94–99)
SARS-COV-2 RNA RESP QL NAA+PROBE: NOT DETECTED
SMALL PLATELETS BLD QL SMEAR: ADEQUATE
SODIUM SERPL-SCNC: 136 MMOL/L (ref 136–145)
SODIUM SERPL-SCNC: 137 MMOL/L (ref 136–145)
SODIUM SERPL-SCNC: 137 MMOL/L (ref 136–145)
SP GR UR STRIP: 1.02 (ref 1–1.03)
SQUAMOUS #/AREA URNS HPF: ABNORMAL /HPF
T&S EXPIRATION DATE: NORMAL
TSH SERPL DL<=0.05 MIU/L-ACNC: 1.8 UIU/ML (ref 0.27–4.2)
UROBILINOGEN UR QL STRIP: ABNORMAL
VARIANT LYMPHS NFR BLD MANUAL: 7 % (ref 19.6–45.3)
VENTILATOR MODE: ABNORMAL
WBC # UR STRIP: ABNORMAL /HPF
WBC NRBC COR # BLD AUTO: 14.15 10*3/MM3 (ref 3.4–10.8)
WBC NRBC COR # BLD AUTO: 16.27 10*3/MM3 (ref 3.4–10.8)
WBC NRBC COR # BLD AUTO: 16.87 10*3/MM3 (ref 3.4–10.8)
WHOLE BLOOD HOLD COAG: NORMAL
WHOLE BLOOD HOLD SPECIMEN: NORMAL

## 2025-02-23 PROCEDURE — 86901 BLOOD TYPING SEROLOGIC RH(D): CPT

## 2025-02-23 PROCEDURE — 72170 X-RAY EXAM OF PELVIS: CPT

## 2025-02-23 PROCEDURE — C1713 ANCHOR/SCREW BN/BN,TIS/BN: HCPCS | Performed by: GENERAL PRACTICE

## 2025-02-23 PROCEDURE — 87186 SC STD MICRODIL/AGAR DIL: CPT | Performed by: INTERNAL MEDICINE

## 2025-02-23 PROCEDURE — C1776 JOINT DEVICE (IMPLANTABLE): HCPCS | Performed by: GENERAL PRACTICE

## 2025-02-23 PROCEDURE — 73590 X-RAY EXAM OF LOWER LEG: CPT | Performed by: RADIOLOGY

## 2025-02-23 PROCEDURE — 25010000002 CEFAZOLIN PER 500 MG: Performed by: GENERAL PRACTICE

## 2025-02-23 PROCEDURE — 86850 RBC ANTIBODY SCREEN: CPT

## 2025-02-23 PROCEDURE — 0202U NFCT DS 22 TRGT SARS-COV-2: CPT | Performed by: INTERNAL MEDICINE

## 2025-02-23 PROCEDURE — 99223 1ST HOSP IP/OBS HIGH 75: CPT

## 2025-02-23 PROCEDURE — 87077 CULTURE AEROBIC IDENTIFY: CPT | Performed by: INTERNAL MEDICINE

## 2025-02-23 PROCEDURE — 73552 X-RAY EXAM OF FEMUR 2/>: CPT | Performed by: RADIOLOGY

## 2025-02-23 PROCEDURE — 25010000002 MORPHINE PER 10 MG: Performed by: STUDENT IN AN ORGANIZED HEALTH CARE EDUCATION/TRAINING PROGRAM

## 2025-02-23 PROCEDURE — 82805 BLOOD GASES W/O2 SATURATION: CPT

## 2025-02-23 PROCEDURE — 71045 X-RAY EXAM CHEST 1 VIEW: CPT

## 2025-02-23 PROCEDURE — 25810000003 LACTATED RINGERS PER 1000 ML: Performed by: NURSE ANESTHETIST, CERTIFIED REGISTERED

## 2025-02-23 PROCEDURE — 86900 BLOOD TYPING SEROLOGIC ABO: CPT

## 2025-02-23 PROCEDURE — 25010000002 NEOSTIGMINE 10 MG/10ML SOLUTION: Performed by: NURSE ANESTHETIST, CERTIFIED REGISTERED

## 2025-02-23 PROCEDURE — 73502 X-RAY EXAM HIP UNI 2-3 VIEWS: CPT | Performed by: RADIOLOGY

## 2025-02-23 PROCEDURE — 80053 COMPREHEN METABOLIC PANEL: CPT

## 2025-02-23 PROCEDURE — 25010000002 CEFTRIAXONE PER 250 MG: Performed by: INTERNAL MEDICINE

## 2025-02-23 PROCEDURE — 93005 ELECTROCARDIOGRAM TRACING: CPT

## 2025-02-23 PROCEDURE — 81001 URINALYSIS AUTO W/SCOPE: CPT

## 2025-02-23 PROCEDURE — 25010000002 CEFAZOLIN PER 500 MG: Performed by: NURSE ANESTHETIST, CERTIFIED REGISTERED

## 2025-02-23 PROCEDURE — 85007 BL SMEAR W/DIFF WBC COUNT: CPT | Performed by: INTERNAL MEDICINE

## 2025-02-23 PROCEDURE — 71045 X-RAY EXAM CHEST 1 VIEW: CPT | Performed by: RADIOLOGY

## 2025-02-23 PROCEDURE — 0SRS019 REPLACEMENT OF LEFT HIP JOINT, FEMORAL SURFACE WITH METAL SYNTHETIC SUBSTITUTE, CEMENTED, OPEN APPROACH: ICD-10-PCS | Performed by: GENERAL PRACTICE

## 2025-02-23 PROCEDURE — 36415 COLL VENOUS BLD VENIPUNCTURE: CPT | Performed by: STUDENT IN AN ORGANIZED HEALTH CARE EDUCATION/TRAINING PROGRAM

## 2025-02-23 PROCEDURE — 70450 CT HEAD/BRAIN W/O DYE: CPT | Performed by: RADIOLOGY

## 2025-02-23 PROCEDURE — 25010000002 GLYCOPYRROLATE 0.4 MG/2ML SOLUTION: Performed by: NURSE ANESTHETIST, CERTIFIED REGISTERED

## 2025-02-23 PROCEDURE — 25010000002 PROPOFOL 200 MG/20ML EMULSION: Performed by: NURSE ANESTHETIST, CERTIFIED REGISTERED

## 2025-02-23 PROCEDURE — 83050 HGB METHEMOGLOBIN QUAN: CPT

## 2025-02-23 PROCEDURE — 94761 N-INVAS EAR/PLS OXIMETRY MLT: CPT

## 2025-02-23 PROCEDURE — 93005 ELECTROCARDIOGRAM TRACING: CPT | Performed by: INTERNAL MEDICINE

## 2025-02-23 PROCEDURE — 83735 ASSAY OF MAGNESIUM: CPT

## 2025-02-23 PROCEDURE — 94799 UNLISTED PULMONARY SVC/PX: CPT

## 2025-02-23 PROCEDURE — 25810000003 SODIUM CHLORIDE 0.9 % SOLUTION: Performed by: INTERNAL MEDICINE

## 2025-02-23 PROCEDURE — 36600 WITHDRAWAL OF ARTERIAL BLOOD: CPT

## 2025-02-23 PROCEDURE — 82375 ASSAY CARBOXYHB QUANT: CPT

## 2025-02-23 PROCEDURE — 85025 COMPLETE CBC W/AUTO DIFF WBC: CPT | Performed by: INTERNAL MEDICINE

## 2025-02-23 PROCEDURE — 80053 COMPREHEN METABOLIC PANEL: CPT | Performed by: INTERNAL MEDICINE

## 2025-02-23 PROCEDURE — 85025 COMPLETE CBC W/AUTO DIFF WBC: CPT

## 2025-02-23 PROCEDURE — 72170 X-RAY EXAM OF PELVIS: CPT | Performed by: RADIOLOGY

## 2025-02-23 PROCEDURE — 25010000002 ONDANSETRON PER 1 MG: Performed by: NURSE ANESTHETIST, CERTIFIED REGISTERED

## 2025-02-23 PROCEDURE — 93010 ELECTROCARDIOGRAM REPORT: CPT | Performed by: SPECIALIST

## 2025-02-23 PROCEDURE — 87086 URINE CULTURE/COLONY COUNT: CPT | Performed by: INTERNAL MEDICINE

## 2025-02-23 PROCEDURE — 72125 CT NECK SPINE W/O DYE: CPT | Performed by: RADIOLOGY

## 2025-02-23 PROCEDURE — 25010000002 FENTANYL CITRATE (PF) 50 MCG/ML SOLUTION: Performed by: NURSE ANESTHETIST, CERTIFIED REGISTERED

## 2025-02-23 PROCEDURE — 84443 ASSAY THYROID STIM HORMONE: CPT

## 2025-02-23 PROCEDURE — 25010000002 ONDANSETRON PER 1 MG: Performed by: STUDENT IN AN ORGANIZED HEALTH CARE EDUCATION/TRAINING PROGRAM

## 2025-02-23 PROCEDURE — 85610 PROTHROMBIN TIME: CPT

## 2025-02-23 DEVICE — BONE PREPARATION KIT
Type: IMPLANTABLE DEVICE | Site: HIP | Status: FUNCTIONAL
Brand: BIOPREP

## 2025-02-23 DEVICE — MODULAR CATHCART TAPERED SPACER 12/14 TAPER +0MM: Type: IMPLANTABLE DEVICE | Site: HIP | Status: FUNCTIONAL

## 2025-02-23 DEVICE — CAP UNIPOL HIP CORAIL ACTIS: Type: IMPLANTABLE DEVICE | Site: HIP | Status: FUNCTIONAL

## 2025-02-23 DEVICE — EMPHASYS 12/14 FEMORAL STEM COLLARED STANDARD OFFSET HA COATED  3 SO
Type: IMPLANTABLE DEVICE | Site: HIP | Status: FUNCTIONAL
Brand: EMPHASYS

## 2025-02-23 DEVICE — MODULAR CATHCART FRACTURE HEAD HIP BALL 48MM OD +5MM: Type: IMPLANTABLE DEVICE | Site: HIP | Status: FUNCTIONAL

## 2025-02-23 DEVICE — VIOLET ANTIBACTERIAL POLYDIOXANONE, KNOTLESS TISSUE CONTROL DEVICE
Type: IMPLANTABLE DEVICE | Site: HIP | Status: FUNCTIONAL
Brand: STRATAFIX

## 2025-02-23 DEVICE — SMARTSET HIGH PERFORMANCE MV MEDIUM VISCOSITY BONE CEMENT 40G
Type: IMPLANTABLE DEVICE | Site: HIP | Status: FUNCTIONAL
Brand: SMARTSET

## 2025-02-23 RX ORDER — IPRATROPIUM BROMIDE AND ALBUTEROL SULFATE 2.5; .5 MG/3ML; MG/3ML
3 SOLUTION RESPIRATORY (INHALATION) ONCE AS NEEDED
Status: DISCONTINUED | OUTPATIENT
Start: 2025-02-23 | End: 2025-02-23 | Stop reason: HOSPADM

## 2025-02-23 RX ORDER — ACETAMINOPHEN 160 MG/5ML
650 SOLUTION ORAL EVERY 4 HOURS PRN
Status: DISCONTINUED | OUTPATIENT
Start: 2025-02-23 | End: 2025-03-07 | Stop reason: HOSPADM

## 2025-02-23 RX ORDER — BISACODYL 5 MG/1
5 TABLET, DELAYED RELEASE ORAL DAILY PRN
Status: DISCONTINUED | OUTPATIENT
Start: 2025-02-23 | End: 2025-03-07 | Stop reason: HOSPADM

## 2025-02-23 RX ORDER — ONDANSETRON 2 MG/ML
4 INJECTION INTRAMUSCULAR; INTRAVENOUS ONCE
Status: COMPLETED | OUTPATIENT
Start: 2025-02-23 | End: 2025-02-23

## 2025-02-23 RX ORDER — SODIUM CHLORIDE 0.9 % (FLUSH) 0.9 %
10 SYRINGE (ML) INJECTION AS NEEDED
Status: DISCONTINUED | OUTPATIENT
Start: 2025-02-23 | End: 2025-03-07 | Stop reason: HOSPADM

## 2025-02-23 RX ORDER — POLYETHYLENE GLYCOL 3350 17 G/17G
17 POWDER, FOR SOLUTION ORAL DAILY PRN
Status: DISCONTINUED | OUTPATIENT
Start: 2025-02-23 | End: 2025-03-07 | Stop reason: HOSPADM

## 2025-02-23 RX ORDER — SODIUM CHLORIDE 9 MG/ML
40 INJECTION, SOLUTION INTRAVENOUS AS NEEDED
Status: DISCONTINUED | OUTPATIENT
Start: 2025-02-23 | End: 2025-03-07 | Stop reason: HOSPADM

## 2025-02-23 RX ORDER — NALOXONE HCL 0.4 MG/ML
0.4 VIAL (ML) INJECTION
Status: DISCONTINUED | OUTPATIENT
Start: 2025-02-23 | End: 2025-02-26

## 2025-02-23 RX ORDER — BISACODYL 10 MG
10 SUPPOSITORY, RECTAL RECTAL DAILY PRN
Status: DISCONTINUED | OUTPATIENT
Start: 2025-02-23 | End: 2025-03-07 | Stop reason: HOSPADM

## 2025-02-23 RX ORDER — ACETAMINOPHEN 325 MG/1
650 TABLET ORAL EVERY 4 HOURS PRN
Status: DISCONTINUED | OUTPATIENT
Start: 2025-02-23 | End: 2025-03-07 | Stop reason: HOSPADM

## 2025-02-23 RX ORDER — NEOSTIGMINE METHYLSULFATE 1 MG/ML
INJECTION INTRAVENOUS AS NEEDED
Status: DISCONTINUED | OUTPATIENT
Start: 2025-02-23 | End: 2025-02-23 | Stop reason: SURG

## 2025-02-23 RX ORDER — SODIUM CHLORIDE, SODIUM LACTATE, POTASSIUM CHLORIDE, CALCIUM CHLORIDE 600; 310; 30; 20 MG/100ML; MG/100ML; MG/100ML; MG/100ML
125 INJECTION, SOLUTION INTRAVENOUS ONCE
Status: DISCONTINUED | OUTPATIENT
Start: 2025-02-23 | End: 2025-02-23 | Stop reason: HOSPADM

## 2025-02-23 RX ORDER — SODIUM CHLORIDE 0.9 % (FLUSH) 0.9 %
10 SYRINGE (ML) INJECTION EVERY 12 HOURS SCHEDULED
Status: DISCONTINUED | OUTPATIENT
Start: 2025-02-23 | End: 2025-02-23 | Stop reason: HOSPADM

## 2025-02-23 RX ORDER — ONDANSETRON 2 MG/ML
4 INJECTION INTRAMUSCULAR; INTRAVENOUS AS NEEDED
Status: DISCONTINUED | OUTPATIENT
Start: 2025-02-23 | End: 2025-02-23 | Stop reason: HOSPADM

## 2025-02-23 RX ORDER — FAMOTIDINE 10 MG/ML
INJECTION, SOLUTION INTRAVENOUS AS NEEDED
Status: DISCONTINUED | OUTPATIENT
Start: 2025-02-23 | End: 2025-02-23 | Stop reason: SURG

## 2025-02-23 RX ORDER — CEFAZOLIN SODIUM 1 G/3ML
INJECTION, POWDER, FOR SOLUTION INTRAMUSCULAR; INTRAVENOUS AS NEEDED
Status: DISCONTINUED | OUTPATIENT
Start: 2025-02-23 | End: 2025-02-23 | Stop reason: SURG

## 2025-02-23 RX ORDER — GLYCOPYRROLATE 0.2 MG/ML
INJECTION INTRAMUSCULAR; INTRAVENOUS AS NEEDED
Status: DISCONTINUED | OUTPATIENT
Start: 2025-02-23 | End: 2025-02-23 | Stop reason: SURG

## 2025-02-23 RX ORDER — HYDROCODONE BITARTRATE AND ACETAMINOPHEN 5; 325 MG/1; MG/1
1 TABLET ORAL EVERY 6 HOURS PRN
Status: DISPENSED | OUTPATIENT
Start: 2025-02-23 | End: 2025-03-03

## 2025-02-23 RX ORDER — ONDANSETRON 2 MG/ML
INJECTION INTRAMUSCULAR; INTRAVENOUS AS NEEDED
Status: DISCONTINUED | OUTPATIENT
Start: 2025-02-23 | End: 2025-02-23 | Stop reason: SURG

## 2025-02-23 RX ORDER — TRANEXAMIC ACID 100 MG/ML
INJECTION, SOLUTION INTRAVENOUS AS NEEDED
Status: DISCONTINUED | OUTPATIENT
Start: 2025-02-23 | End: 2025-02-23 | Stop reason: SURG

## 2025-02-23 RX ORDER — ENOXAPARIN SODIUM 100 MG/ML
40 INJECTION SUBCUTANEOUS EVERY 24 HOURS
Status: DISCONTINUED | OUTPATIENT
Start: 2025-02-24 | End: 2025-03-07 | Stop reason: HOSPADM

## 2025-02-23 RX ORDER — SODIUM CHLORIDE 0.9 % (FLUSH) 0.9 %
10 SYRINGE (ML) INJECTION AS NEEDED
Status: DISCONTINUED | OUTPATIENT
Start: 2025-02-23 | End: 2025-02-23 | Stop reason: HOSPADM

## 2025-02-23 RX ORDER — MAGNESIUM HYDROXIDE 1200 MG/15ML
LIQUID ORAL AS NEEDED
Status: DISCONTINUED | OUTPATIENT
Start: 2025-02-23 | End: 2025-02-23 | Stop reason: HOSPADM

## 2025-02-23 RX ORDER — METOPROLOL TARTRATE 1 MG/ML
INJECTION, SOLUTION INTRAVENOUS AS NEEDED
Status: DISCONTINUED | OUTPATIENT
Start: 2025-02-23 | End: 2025-02-23 | Stop reason: SURG

## 2025-02-23 RX ORDER — SODIUM CHLORIDE 9 MG/ML
40 INJECTION, SOLUTION INTRAVENOUS AS NEEDED
Status: DISCONTINUED | OUTPATIENT
Start: 2025-02-23 | End: 2025-02-23 | Stop reason: HOSPADM

## 2025-02-23 RX ORDER — MIDAZOLAM HYDROCHLORIDE 1 MG/ML
0.5 INJECTION, SOLUTION INTRAMUSCULAR; INTRAVENOUS
Status: DISCONTINUED | OUTPATIENT
Start: 2025-02-23 | End: 2025-02-23 | Stop reason: HOSPADM

## 2025-02-23 RX ORDER — OXYCODONE AND ACETAMINOPHEN 5; 325 MG/1; MG/1
1 TABLET ORAL ONCE AS NEEDED
Status: DISCONTINUED | OUTPATIENT
Start: 2025-02-23 | End: 2025-02-23 | Stop reason: HOSPADM

## 2025-02-23 RX ORDER — SODIUM CHLORIDE 0.9 % (FLUSH) 0.9 %
10 SYRINGE (ML) INJECTION EVERY 12 HOURS SCHEDULED
Status: DISCONTINUED | OUTPATIENT
Start: 2025-02-23 | End: 2025-03-07 | Stop reason: HOSPADM

## 2025-02-23 RX ORDER — ACETAMINOPHEN 650 MG/1
650 SUPPOSITORY RECTAL EVERY 4 HOURS PRN
Status: DISCONTINUED | OUTPATIENT
Start: 2025-02-23 | End: 2025-03-07 | Stop reason: HOSPADM

## 2025-02-23 RX ORDER — PROPOFOL 10 MG/ML
INJECTION, EMULSION INTRAVENOUS AS NEEDED
Status: DISCONTINUED | OUTPATIENT
Start: 2025-02-23 | End: 2025-02-23 | Stop reason: SURG

## 2025-02-23 RX ORDER — MORPHINE SULFATE 2 MG/ML
2 INJECTION, SOLUTION INTRAMUSCULAR; INTRAVENOUS ONCE
Status: COMPLETED | OUTPATIENT
Start: 2025-02-23 | End: 2025-02-23

## 2025-02-23 RX ORDER — FENTANYL CITRATE 50 UG/ML
50 INJECTION, SOLUTION INTRAMUSCULAR; INTRAVENOUS
Status: DISCONTINUED | OUTPATIENT
Start: 2025-02-23 | End: 2025-02-23 | Stop reason: HOSPADM

## 2025-02-23 RX ORDER — ROCURONIUM BROMIDE 10 MG/ML
INJECTION, SOLUTION INTRAVENOUS AS NEEDED
Status: DISCONTINUED | OUTPATIENT
Start: 2025-02-23 | End: 2025-02-23 | Stop reason: SURG

## 2025-02-23 RX ORDER — FENTANYL CITRATE 50 UG/ML
INJECTION, SOLUTION INTRAMUSCULAR; INTRAVENOUS AS NEEDED
Status: DISCONTINUED | OUTPATIENT
Start: 2025-02-23 | End: 2025-02-23 | Stop reason: SURG

## 2025-02-23 RX ORDER — MORPHINE SULFATE 2 MG/ML
1 INJECTION, SOLUTION INTRAMUSCULAR; INTRAVENOUS EVERY 4 HOURS PRN
Status: DISCONTINUED | OUTPATIENT
Start: 2025-02-23 | End: 2025-02-26

## 2025-02-23 RX ORDER — SODIUM CHLORIDE, SODIUM LACTATE, POTASSIUM CHLORIDE, CALCIUM CHLORIDE 600; 310; 30; 20 MG/100ML; MG/100ML; MG/100ML; MG/100ML
INJECTION, SOLUTION INTRAVENOUS CONTINUOUS PRN
Status: DISCONTINUED | OUTPATIENT
Start: 2025-02-23 | End: 2025-02-23 | Stop reason: SURG

## 2025-02-23 RX ORDER — AMOXICILLIN 250 MG
2 CAPSULE ORAL 2 TIMES DAILY PRN
Status: DISCONTINUED | OUTPATIENT
Start: 2025-02-23 | End: 2025-03-07 | Stop reason: HOSPADM

## 2025-02-23 RX ADMIN — FENTANYL CITRATE 100 MCG: 50 INJECTION INTRAMUSCULAR; INTRAVENOUS at 09:39

## 2025-02-23 RX ADMIN — Medication 10 ML: at 13:38

## 2025-02-23 RX ADMIN — MORPHINE SULFATE 2 MG: 2 INJECTION, SOLUTION INTRAMUSCULAR; INTRAVENOUS at 03:58

## 2025-02-23 RX ADMIN — SODIUM CHLORIDE 500 ML: 9 INJECTION, SOLUTION INTRAVENOUS at 16:43

## 2025-02-23 RX ADMIN — TRANEXAMIC ACID 1000 MG: 100 INJECTION, SOLUTION INTRAVENOUS at 09:47

## 2025-02-23 RX ADMIN — METOPROLOL TARTRATE 2.5 MG: 1 INJECTION, SOLUTION INTRAVENOUS at 11:13

## 2025-02-23 RX ADMIN — ACETAMINOPHEN 650 MG: 650 SUPPOSITORY RECTAL at 13:38

## 2025-02-23 RX ADMIN — GLYCOPYRROLATE 0.6 MG: 0.2 INJECTION INTRAMUSCULAR; INTRAVENOUS at 10:42

## 2025-02-23 RX ADMIN — NEOSTIGMINE METHYLSULFATE 3 MG: 0.5 INJECTION INTRAVENOUS at 10:42

## 2025-02-23 RX ADMIN — Medication 10 ML: at 20:59

## 2025-02-23 RX ADMIN — PROPOFOL 60 MG: 10 INJECTION, EMULSION INTRAVENOUS at 09:43

## 2025-02-23 RX ADMIN — CEFAZOLIN 2000 MG: 2 INJECTION, POWDER, FOR SOLUTION INTRAMUSCULAR; INTRAVENOUS at 09:13

## 2025-02-23 RX ADMIN — CEFTRIAXONE 1000 MG: 1 INJECTION, POWDER, FOR SOLUTION INTRAMUSCULAR; INTRAVENOUS at 17:30

## 2025-02-23 RX ADMIN — TRANEXAMIC ACID 1000 MG: 100 INJECTION, SOLUTION INTRAVENOUS at 10:34

## 2025-02-23 RX ADMIN — ROCURONIUM BROMIDE 25 MG: 10 SOLUTION INTRAVENOUS at 09:43

## 2025-02-23 RX ADMIN — SODIUM CHLORIDE, POTASSIUM CHLORIDE, SODIUM LACTATE AND CALCIUM CHLORIDE: 600; 310; 30; 20 INJECTION, SOLUTION INTRAVENOUS at 09:39

## 2025-02-23 RX ADMIN — FAMOTIDINE 20 MG: 10 INJECTION, SOLUTION INTRAVENOUS at 09:39

## 2025-02-23 RX ADMIN — ONDANSETRON 4 MG: 2 INJECTION INTRAMUSCULAR; INTRAVENOUS at 09:39

## 2025-02-23 RX ADMIN — ONDANSETRON 4 MG: 2 INJECTION INTRAMUSCULAR; INTRAVENOUS at 03:59

## 2025-02-23 RX ADMIN — SODIUM CHLORIDE 500 ML: 9 INJECTION, SOLUTION INTRAVENOUS at 15:17

## 2025-02-23 RX ADMIN — CEFAZOLIN 2 G: 1 INJECTION, POWDER, FOR SOLUTION INTRAMUSCULAR; INTRAVENOUS; PARENTERAL at 09:43

## 2025-02-23 NOTE — PLAN OF CARE
Goal Outcome Evaluation:  Plan of Care Reviewed With: patient        Progress: no change  Outcome Evaluation: Patient resting in bed at this time. Arrived to unit from ER this shift. VSS on room air. Spouse at bedside. Will continue plan of care.

## 2025-02-23 NOTE — ANESTHESIA POSTPROCEDURE EVALUATION
Patient: Crista Blunt    Procedure Summary       Date: 02/23/25 Room / Location: [facility] OR 04 /  [facility] OR    Anesthesia Start: 0939 Anesthesia Stop: 1053    Procedure: Left hip lexi arthroplasty (Left: Hip) Diagnosis:       Closed fracture of left hip, initial encounter      (Closed fracture of left hip, initial encounter [S72.002A])    Surgeons: Perry Cobb MD Provider: Fer Shaver MD    Anesthesia Type: general ASA Status: 3            Anesthesia Type: general    Vitals  Vitals Value Taken Time   /88 02/23/25 1105   Temp 97.6 °F (36.4 °C) 02/23/25 1055   Pulse 126 02/23/25 1108   Resp 18 02/23/25 1100   SpO2 92 % 02/23/25 1108   Vitals shown include unfiled device data.        Post Anesthesia Care and Evaluation    Patient location during evaluation: PHASE II  Patient participation: complete - patient participated  Level of consciousness: awake and alert  Pain score: 0  Pain management: adequate    Airway patency: patent  Anesthetic complications: No anesthetic complications    Cardiovascular status: acceptable  Respiratory status: acceptable  Hydration status: acceptable

## 2025-02-23 NOTE — H&P
[facility] Medicine Services  History & Physical    Patient Identification:  Name:  Crista Blunt  Age:  84 y.o.  Sex:  female  :  1940  MRN:  [id number]   Visit Number:  [id number]  Admit Date: 2025   Primary Care Physician:  Abby Mckenzie DO    Subjective     Chief complaint: Fall    History of presenting illness:      Crista Blunt is a 84 y.o. female with past medical history significant for severe Alzheimer's dementia, hypercholesterolemia, hypertension, GERD, osteoarthritis, depression, anxiety, and advanced age.  Patient transported to [facility] emergency department for further evaluation of left hip pain after experiencing a mechanical fall at home.  Patient does have severe dementia and was unable to provide details for HPI.  However, spouse was at bedside.  He states that he was helping patient walk to the living room from the kitchen when the patient went into a direction he was not expecting, causing patient to fall and hit her head and the left hip.  He states that he and family member had a very difficult time getting the patient up out of the floor.  He states that patient typically does have a hard time ambulating, although usually she is a x 1 assist. He states that patient has not taken any home medications in approximately 2 years. Spouse states that patient has a poor appetite and usually only eats 1 meal per day.  During my evaluation, patient was awake, calm, cooperative, and appearing in no acute distress.  She denied any pain at the time of my exam.  She was able to state her name.  She was unable to answer any other pertinent questions or orientation questions.  The left lower extremity appears neurovascularly intact.  Left lower extremity appears shortened and externally rotated.  X-ray of the left hip obtained in the ED revealed acute left femoral neck fracture without dislocation. ED provider discussed with orthopedic surgery  who accepted patient in consult.  Patient will be admitted to the [facility] floor for further monitoring, evaluation, and treatment.  Discussed plans with patient's spouse who voices agreement and understanding with no further questions or concerns at this time.    Upon arrival to the ED, vital signs were temperature 98, pulse 90, respirations 18, blood pressure 167/89, SpO2 saturation 92% on room air.  CMP with glucose 171, otherwise unremarkable.  CBC with WBCs 16.27, otherwise unremarkable.  Chest x-ray pending.  X-ray of the left hip noted acute left femoral neck fracture.  No acute dislocation of the left femoral head.  Mild osteoarthritis at the right and left hip joint.  Intact pelvis.  No acute foreign body.  X-ray of the left tib-fib unremarkable.  X-ray of the left femur noted acute left femoral neck fracture.  No acute dislocation.  CT of the head without contrast noted moderate generalized brain volume loss with mild to moderate chronic small vessel ischemic type changes in the white matter.  Ex vacuo dilatation of the ventricular system due to underlying deep white matter parenchymal volume loss.  No acute intracranial hemorrhage, mass, infarct, or edema.  No scalp hematoma.  CT of the cervical spine without contrast unremarkable.  No acute fracture or dislocation.  No apical pneumothorax.    Known Emergency Department medications received prior to my evaluation included N/A (no medications given while in ED). Emergency Department Room location at the time of my evaluation [facility] [place]. Discussed with admitting physician, Dr. Arteaga, Heriberto JOHN MD.      ---------------------------------------------------------------------------------------------------------------------   Review of Systems   Unable to perform ROS: Dementia     ---------------------------------------------------------------------------------------------------------------------   History reviewed. No pertinent past medical history.  History  reviewed. No pertinent surgical history.  History reviewed. No pertinent family history.  Social History     Socioeconomic History    Marital status:      ---------------------------------------------------------------------------------------------------------------------   Allergies:  Sulfa antibiotics  ---------------------------------------------------------------------------------------------------------------------   Home medications:    Medications below are reported home medications pulling from within the system; at this time, these medications have not been reconciled unless otherwise specified and are in the verification process for further verifcation as current home medications.  (Not in a hospital admission)      Hospital Scheduled Meds:  Morphine, 2 mg, Intravenous, Once  ondansetron, 4 mg, Intravenous, Once           Current listed hospital scheduled medications may not yet reflect those currently placed in orders that are signed and held awaiting patient's arrival to floor.   ---------------------------------------------------------------------------------------------------------------------     Objective     Vital Signs:  Temp:  [98 °F (36.7 °C)] 98 °F (36.7 °C)  Heart Rate:  [] 100  Resp:  [18] 18  BP: (140-167)/(87-89) 140/87      02/22/25 2023   Weight: 59 kg (130 lb 1.1 oz)     Body mass index is 21.64 kg/m².  ---------------------------------------------------------------------------------------------------------------------       Physical Exam  Vitals reviewed.   Constitutional:       General: She is awake. She is not in acute distress.     Appearance: She is ill-appearing (chronically). She is not diaphoretic.   HENT:      Head: Normocephalic and atraumatic.      Mouth/Throat:      Mouth: Mucous membranes are dry.   Eyes:      Extraocular Movements: Extraocular movements intact.      Pupils: Pupils are equal, round, and reactive to light.   Cardiovascular:      Rate and Rhythm:  Normal rate and regular rhythm.      Pulses: Normal pulses.           Dorsalis pedis pulses are 2+ on the right side and 2+ on the left side.      Heart sounds: Normal heart sounds. No murmur heard.     No friction rub.   Pulmonary:      Effort: Pulmonary effort is normal. No accessory muscle usage, respiratory distress or retractions.      Breath sounds: No wheezing, rhonchi or rales.   Abdominal:      General: Abdomen is flat. Bowel sounds are normal. There is no distension.      Palpations: Abdomen is soft.      Tenderness: There is no abdominal tenderness. There is no guarding.   Musculoskeletal:      Cervical back: Neck supple. No rigidity.      Right lower leg: No edema.      Left lower leg: No edema.      Comments: LLE appears shortened and externally rotated. Appearing neurovascularly intact upon my exam.    Skin:     General: Skin is warm and dry.      Capillary Refill: Capillary refill takes 2 to 3 seconds.      Coloration: Skin is pale.   Neurological:      Mental Status: She is alert. Mental status is at baseline. She is disoriented.      Sensory: Sensation is intact.      Motor: Weakness (generalized) present. No tremor.   Psychiatric:         Attention and Perception: She is inattentive.         Mood and Affect: Mood is not anxious.         Behavior: Behavior is not agitated, aggressive or combative. Behavior is cooperative.         Cognition and Memory: Cognition is impaired. Memory is impaired.       ---------------------------------------------------------------------------------------------------------------------  EKG: Performed in ED during my evaluation. Revealed normal sinus rhythm/sinus tachycardia 100 without evidence of acute ischemia. Image upload pending.     Telemetry:  Patient not on continuous cardiac monitoring in Hallway Bed 101 in ED.   ---------------------------------------------------------------------------------------------------------------------   Results from last 7 days  "  Lab Units 02/23/25  0155   WBC 10*3/mm3 16.27*   HEMOGLOBIN g/dL 14.2   HEMATOCRIT % 43.7   MCV fL 95.2   MCHC g/dL 32.5   PLATELETS 10*3/mm3 258         Results from last 7 days   Lab Units 02/23/25  0155   SODIUM mmol/L 136   POTASSIUM mmol/L 3.8   CHLORIDE mmol/L 101   CO2 mmol/L 23.9   BUN mg/dL 14   CREATININE mg/dL 0.81   CALCIUM mg/dL 9.1   GLUCOSE mg/dL 171*   ALBUMIN g/dL 4.1   BILIRUBIN mg/dL 0.4   ALK PHOS U/L 100   AST (SGOT) U/L 16   ALT (SGPT) U/L 12   Estimated Creatinine Clearance: 49 mL/min (by C-G formula based on SCr of 0.81 mg/dL).  No results found for: \"AMMONIA\"          No results found for: \"HGBA1C\"  Lab Results   Component Value Date    TSH 2.420 04/04/2024     No results found for: \"PREGTESTUR\", \"PREGSERUM\", \"HCG\", \"HCGQUANT\"  Pain Management Panel           No data to display                  ---------------------------------------------------------------------------------------------------------------------  Imaging Results (Last 7 Days)       Procedure Component Value Units Date/Time    XR Chest 1 View [[id number]] Resulted: 02/23/25 0204     Updated: 02/23/25 0223    XR Tibia Fibula 2 View Left [[id number]] Collected: 02/23/25 0019     Updated: 02/23/25 0022    Narrative:      PROCEDURE: X-ray examination of the left tibia and fibula performed on  February 22, 2025. 3 views.     HISTORY: Fall. Leg pain.     COMPARISON: None.     FINDINGS:     Intact left tibia and fibula with no acute fracture or dislocation.  No lytic or blastic lesion. No foreign body.  No tibiotalar joint effusion.  No soft tissue swelling.       Impression:         Intact left tibia and fibula with no acute fracture or dislocation.  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  Mild osteoarthritis at the left tibiotalar joint.  No foreign body.           This report was finalized on 2/23/2025 12:20 AM by Eamon Omalley MD.       XR Femur 2 View Left [[id number]] Collected: 02/23/25 0018     Updated: 02/23/25 0021    " Narrative:      PROCEDURE: X-ray examination of the left femur performed on February 22, 2025. 4 films.     HISTORY: Left leg pain. Hip pain. Fall.     COMPARISON: None.     FINDINGS:     Acute left femoral neck fracture.  No acute dislocation of the left femoral head.  Mild osteoarthritis at the left hip joint  Mild osteoarthritis at the left knee.  No acute foreign body.       Impression:         Acute left femoral neck fracture.  No acute dislocation.  Osteoarthritis.     This report was finalized on 2/23/2025 12:19 AM by Eamon Omalley MD.       XR Hip With or Without Pelvis 2 - 3 View Left [[id number]] Collected: 02/23/25 0016     Updated: 02/23/25 0020    Narrative:      PROCEDURE: X-ray examination of the pelvis and left hip performed on  February 22, 2025. 3 views.     HISTORY: Fall. Pain.     COMPARISON: None.     FINDINGS:     Intact pelvis  Intact SI joints and intact pubic symphysis.  Mild osteoarthritis at the right and left hip joint.  Degenerative disc disease in the lower lumbar spine.  Acute left femoral neck fracture.   No acute dislocation of the left femoral head.       Impression:      Impression:     Acute left femoral neck fracture.   No acute dislocation of the left femoral head.  Mild osteoarthritis at the right and left hip joint.  Intact pelvis  No acute foreign body.     This report was finalized on 2/23/2025 12:18 AM by Eamon Omalley MD.       CT Cervical Spine Without Contrast [[id number]] Collected: 02/23/25 0015     Updated: 02/23/25 0018    Narrative:      PROCEDURE: CT of the cervical spine performed without IV contrast on  February 22, 2025. The examination was performed with 2 mm axial imaging  and 2 mm sagittal coronal reconstruction images. Examination was  performed according to as low as reasonably achievable dose protocol.     HISTORY: Fall. Head injury. Neck injury.     COMPARISON: None.     FINDINGS:     Anatomic alignment of the cervical vertebral bodies with no  acute  fracture or dislocation.  Normal thickness of the prevertebral soft tissues  No lytic or blastic lesion.       Impression:         No acute fracture or dislocation.   No apical pneumothorax.     This report was finalized on 2/23/2025 12:16 AM by Eamon Omalley MD.       CT Head Without Contrast [[id number]] Collected: 02/23/25 0012     Updated: 02/23/25 0017    Narrative:      PROCEDURE: CT brain performed without IV contrast on February 22, 2025.  The examination was performed with 3 mm axial imaging and 3 mm sagittal  and coronal reconstruction images. The examination was performed  according to as low as reasonably achievable dose protocol.     HISTORY: Fall. Head injury. Neck injury.     COMPARISON: None.     FINDINGS:     Moderate generalized brain volume loss  Mild to moderate chronic small vessel ischemic type changes in the white  matter.  Deep white matter parenchymal volume loss with associated ex vacuo  dilatation of the lateral ventricles and basal cisterns.  No acute intracranial hemorrhage, mass, infarct, or edema.  Mild mucosal thickening in the ethmoid air cells.  High jugular bulb noted on the right side.  Debris noted within the right and left external auditory canal, right  greater than left.  Left side mastoiditis  Minimal mucosal thickening in the ethmoid air cells  No fracture.  No scalp hematoma.       Impression:      Impression:     Moderate generalized brain volume loss with mild to moderate chronic  small vessel ischemic type changes in the white matter.  Ex-vacuo dilatation of the ventricular system due to underlying deep  white matter parenchymal volume loss  No acute intracranial hemorrhage, mass, infarct, or edema.  Minimal mucosal thickening in the ethmoid air cells  Partial opacification of the left mastoid air cells.  No fracture or dislocation.  No scalp hematoma.     This report was finalized on 2/23/2025 12:15 AM by Eamon Omalley MD.               Last echocardiogram:  No  previous echo on file.     I have personally reviewed the above radiology images and read the final radiology report on 02/23/25  ---------------------------------------------------------------------------------------------------------------------  Assessment / Plan     Active Hospital Problems    Diagnosis  POA    **Hip fracture [S72.009A]  Yes       ASSESSMENT/PLAN:  #Acute non-displaced left femoral neck fracture s/p mechanical fall at home prior to arrival   #Leukocytosis (POA), likely reactive   #Severe Alzheimer's dementia  #Hypercholesterolemia  #Hypertension  #GERD  #Osteoarthritis  #Depression and anxiety  #Advanced age  -Radiological images reviewed.   -Orthopedic surgery consulted, input/assistance is much appreciated.   -PRN IV/PO pain medication available with holding parameters to prevent hypotension, oversedation, and/or respiratory depression.   -Continuous pulse oximetry ordered.   -Neurovascular checks Q 4 hours.   -NPO pending surgical evaluation/intervention.  -Strict Bedrest.   -Fall precautions.   -May utilize external urinary catheter.   -No home medications to review as patient reportedly has been off all medications for the past 2 years. Spouse states that he does occasionally give her a THC gummy.   -Provide reorientation/redirection as necessary.   -Utilize bed alarm.  -PT/OT consults for postoperative evaluation.   -Case management consulted for assistance with discharge plans.   -VS currently stable; continue to closely monitor VS per hospital policy.   -Repeat labs in the AM (CBC and CMP). Obtain PT-INR.   -Preop EKG ordered with no concern for acute ischemia; revealed NSR/.  -Preop COVID-19 and Flu swab ordered, pending.      ----------  -DVT prophylaxis: SCD (Right leg ONLY).   -Activity: Bedrest.   -Expected length of stay:   INPATIENT status due to the need for care which can only be reasonably provided in an hospital setting such as aggressive/expedited ancillary services  and/or consultation services, the necessity for IV medications, close physician monitoring and/or the possible need for procedures.  In such, I feel patient's risk for adverse outcomes and need for care warrant INPATIENT evaluation and predict the patient's care encounter to likely last beyond 2 midnights.   -Disposition pending clinical course.     High risk secondary to acute non-displaced left femoral neck fracture status post mechanical fall at home prior to arrival, severe Alzheimer's dementia, and advanced age.     CODE STATUS: DNR/DNI, discussed with spouse at bedside in ED (H101).       Aysha Cardoza, APRN   02/23/25  03:27 EST  Pager #[phone]  ---------------------------------------------------------------------------------------------------------------------

## 2025-02-23 NOTE — ANESTHESIA PROCEDURE NOTES
Airway  Urgency: elective    Date/Time: 2/23/2025 9:43 AM  Airway not difficult    General Information and Staff    Patient location during procedure: OR  CRNA/CAA: Jeanette Hutson CRNA    Indications and Patient Condition  Indications for airway management: airway protection    Preoxygenated: yes  MILS maintained throughout  Mask difficulty assessment: 0 - not attempted    Final Airway Details  Final airway type: endotracheal airway      Successful airway: ETT  Cuffed: yes   Successful intubation technique: direct laryngoscopy  Endotracheal tube insertion site: oral  Blade: Blunt  Blade size: 2  ETT size (mm): 7.0  Cormack-Lehane Classification: grade I - full view of glottis  Placement verified by: chest auscultation   Measured from: lips  ETT/EBT  to lips (cm): 21  Number of attempts at approach: 1  Assessment: lips, teeth, and gum same as pre-op and atraumatic intubation

## 2025-02-23 NOTE — PAYOR COMM NOTE
"Crista Connors (84 y.o. Female)       Date of Birth   1940    Social Security Number       Address   [address] [place], [place] [address]    Home Phone   [phone]    MRN   [id number]       [facility]    Marital Status                               Admission Date   25    Admission Type   Emergency    Admitting Provider   Heriberto Arteaga MD    Attending Provider   Thom Strickland MD    Department, Room/Bed   [address], 3339/       Discharge Date       Discharge Disposition       Discharge Destination                                 Attending Provider: Thom Strickland MD    Allergies: Sulfa Antibiotics    Isolation: None   Infection: COVID Screen (preop/placement) (25)   Code Status: No CPR    Ht: 165.1 cm (65\")   Wt: 62.2 kg (137 lb 2 oz)    Admission Cmt: None   Principal Problem: Hip fracture [S72.009A]                   Active Insurance as of 2025       Primary Coverage       Payor Plan Insurance Group Employer/Plan Group    HU[employer] MEDICARE REPLACEMENT HU[employer] MED ADV GROUP [id number]       Payor Plan Address Payor Plan Phone Number Payor Plan Fax Number Effective Dates    [address] [address] [phone]  2019 - None Entered    [facility] [id number]         Subscriber Name Subscriber Birth Date Member ID       CRISTA CONNORS 1940 [id number]                     Emergency Contacts        (Rel.) Home Phone Work Phone Mobile Phone    Donavon Connors (Spouse) [phone] -- --                 History & Physical        Aysha Cardoza APRN at 25 0306       Attestation signed by Heriberto Arteaga MD at 25 0504    I have reviewed this documentation and agree.  I have discussed and formulated the assessment and plan with ZABRINA Olmstead.                      [facility]  History & Physical    Patient Identification:  Name:  Crista Connors  Age:  84 y.o.  Sex:  female  :  1940  MRN:  [id number] "   Visit Number:  [id number]  Admit Date: 2/22/2025   Primary Care Physician:  Abby Mckenzie DO    Subjective     Chief complaint: Fall    History of presenting illness:      Crista Blunt is a 84 y.o. female with past medical history significant for severe Alzheimer's dementia, hypercholesterolemia, hypertension, GERD, osteoarthritis, depression, anxiety, and advanced age.  Patient transported to [facility] emergency department for further evaluation of left hip pain after experiencing a mechanical fall at home.  Patient does have severe dementia and was unable to provide details for HPI.  However, spouse was at bedside.  He states that he was helping patient walk to the living room from the kitchen when the patient went into a direction he was not expecting, causing patient to fall and hit her head and the left hip.  He states that he and family member had a very difficult time getting the patient up out of the floor.  He states that patient typically does have a hard time ambulating, although usually she is a x 1 assist. He states that patient has not taken any home medications in approximately 2 years. Spouse states that patient has a poor appetite and usually only eats 1 meal per day.  During my evaluation, patient was awake, calm, cooperative, and appearing in no acute distress.  She denied any pain at the time of my exam.  She was able to state her name.  She was unable to answer any other pertinent questions or orientation questions.  The left lower extremity appears neurovascularly intact.  Left lower extremity appears shortened and externally rotated.  X-ray of the left hip obtained in the ED revealed acute left femoral neck fracture without dislocation. ED provider discussed with orthopedic surgery who accepted patient in consult.  Patient will be admitted to the [facility] floor for further monitoring, evaluation, and treatment.  Discussed plans with patient's spouse who voices  agreement and understanding with no further questions or concerns at this time.    Upon arrival to the ED, vital signs were temperature 98, pulse 90, respirations 18, blood pressure 167/89, SpO2 saturation 92% on room air.  CMP with glucose 171, otherwise unremarkable.  CBC with WBCs 16.27, otherwise unremarkable.  Chest x-ray pending.  X-ray of the left hip noted acute left femoral neck fracture.  No acute dislocation of the left femoral head.  Mild osteoarthritis at the right and left hip joint.  Intact pelvis.  No acute foreign body.  X-ray of the left tib-fib unremarkable.  X-ray of the left femur noted acute left femoral neck fracture.  No acute dislocation.  CT of the head without contrast noted moderate generalized brain volume loss with mild to moderate chronic small vessel ischemic type changes in the white matter.  Ex vacuo dilatation of the ventricular system due to underlying deep white matter parenchymal volume loss.  No acute intracranial hemorrhage, mass, infarct, or edema.  No scalp hematoma.  CT of the cervical spine without contrast unremarkable.  No acute fracture or dislocation.  No apical pneumothorax.    Known Emergency Department medications received prior to my evaluation included N/A (no medications given while in ED). Emergency Department Room location at the time of my evaluation was [facility]. Discussed with admitting physician, Heriberto Thornton MD.      ---------------------------------------------------------------------------------------------------------------------   Review of Systems   Unable to perform ROS: Dementia     ---------------------------------------------------------------------------------------------------------------------   History reviewed. No pertinent past medical history.  History reviewed. No pertinent surgical history.  History reviewed. No pertinent family history.  Social History     Socioeconomic History    Marital status:       ---------------------------------------------------------------------------------------------------------------------   Allergies:  Sulfa antibiotics  ---------------------------------------------------------------------------------------------------------------------   Home medications:    Medications below are reported home medications pulling from within the system; at this time, these medications have not been reconciled unless otherwise specified and are in the verification process for further verifcation as current home medications.  (Not in a hospital admission)      Hospital Scheduled Meds:  Morphine, 2 mg, Intravenous, Once  ondansetron, 4 mg, Intravenous, Once           Current listed hospital scheduled medications may not yet reflect those currently placed in orders that are signed and held awaiting patient's arrival to floor.   ---------------------------------------------------------------------------------------------------------------------     Objective     Vital Signs:  Temp:  [98 °F (36.7 °C)] 98 °F (36.7 °C)  Heart Rate:  [] 100  Resp:  [18] 18  BP: (140-167)/(87-89) 140/87      02/22/25 2023   Weight: 59 kg (130 lb 1.1 oz)     Body mass index is 21.64 kg/m².  ---------------------------------------------------------------------------------------------------------------------       Physical Exam  Vitals reviewed.   Constitutional:       General: She is awake. She is not in acute distress.     Appearance: She is ill-appearing (chronically). She is not diaphoretic.   HENT:      Head: Normocephalic and atraumatic.      Mouth/Throat:      Mouth: Mucous membranes are dry.   Eyes:      Extraocular Movements: Extraocular movements intact.      Pupils: Pupils are equal, round, and reactive to light.   Cardiovascular:      Rate and Rhythm: Normal rate and regular rhythm.      Pulses: Normal pulses.           Dorsalis pedis pulses are 2+ on the right side and 2+ on the left side.      Heart  sounds: Normal heart sounds. No murmur heard.     No friction rub.   Pulmonary:      Effort: Pulmonary effort is normal. No accessory muscle usage, respiratory distress or retractions.      Breath sounds: No wheezing, rhonchi or rales.   Abdominal:      General: Abdomen is flat. Bowel sounds are normal. There is no distension.      Palpations: Abdomen is soft.      Tenderness: There is no abdominal tenderness. There is no guarding.   Musculoskeletal:      Cervical back: Neck supple. No rigidity.      Right lower leg: No edema.      Left lower leg: No edema.      Comments: LLE appears shortened and externally rotated. Appearing neurovascularly intact upon my exam.    Skin:     General: Skin is warm and dry.      Capillary Refill: Capillary refill takes 2 to 3 seconds.      Coloration: Skin is pale.   Neurological:      Mental Status: She is alert. Mental status is at baseline. She is disoriented.      Sensory: Sensation is intact.      Motor: Weakness (generalized) present. No tremor.   Psychiatric:         Attention and Perception: She is inattentive.         Mood and Affect: Mood is not anxious.         Behavior: Behavior is not agitated, aggressive or combative. Behavior is cooperative.         Cognition and Memory: Cognition is impaired. Memory is impaired.       ---------------------------------------------------------------------------------------------------------------------  EKG: Performed in ED during my evaluation. Revealed normal sinus rhythm/sinus tachycardia 100 without evidence of acute ischemia. Image upload pending.     Telemetry:  Patient not on continuous cardiac monitoring in [facility] Bed 101 in ED.   ---------------------------------------------------------------------------------------------------------------------   Results from last 7 days   Lab Units 02/23/25  0155   WBC 10*3/mm3 16.27*   HEMOGLOBIN g/dL 14.2   HEMATOCRIT % 43.7   MCV fL 95.2   MCHC g/dL 32.5   PLATELETS 10*3/mm3 258        "  Results from last 7 days   Lab Units 02/23/25  0155   SODIUM mmol/L 136   POTASSIUM mmol/L 3.8   CHLORIDE mmol/L 101   CO2 mmol/L 23.9   BUN mg/dL 14   CREATININE mg/dL 0.81   CALCIUM mg/dL 9.1   GLUCOSE mg/dL 171*   ALBUMIN g/dL 4.1   BILIRUBIN mg/dL 0.4   ALK PHOS U/L 100   AST (SGOT) U/L 16   ALT (SGPT) U/L 12   Estimated Creatinine Clearance: 49 mL/min (by C-G formula based on SCr of 0.81 mg/dL).  No results found for: \"AMMONIA\"          No results found for: \"HGBA1C\"  Lab Results   Component Value Date    TSH 2.420 04/04/2024     No results found for: \"PREGTESTUR\", \"PREGSERUM\", \"HCG\", \"HCGQUANT\"  Pain Management Panel           No data to display                  ---------------------------------------------------------------------------------------------------------------------  Imaging Results (Last 7 Days)       Procedure Component Value Units Date/Time    XR Chest 1 View [[id number]] Resulted: 02/23/25 0204     Updated: 02/23/25 0223    XR Tibia Fibula 2 View Left [[id number]] Collected: 02/23/25 0019     Updated: 02/23/25 0022    Narrative:      PROCEDURE: X-ray examination of the left tibia and fibula performed on  February 22, 2025. 3 views.     HISTORY: Fall. Leg pain.     COMPARISON: None.     FINDINGS:     Intact left tibia and fibula with no acute fracture or dislocation.  No lytic or blastic lesion. No foreign body.  No tibiotalar joint effusion.  No soft tissue swelling.       Impression:         Intact left tibia and fibula with no acute fracture or dislocation.  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  Mild osteoarthritis at the left tibiotalar joint.  No foreign body.           This report was finalized on 2/23/2025 12:20 AM by Eamon Omalley MD.       XR Femur 2 View Left [[id number]] Collected: 02/23/25 0018     Updated: 02/23/25 0021    Narrative:      PROCEDURE: X-ray examination of the left femur performed on February 22, 2025. 4 films.     HISTORY: Left leg pain. Hip pain. Fall.   "   COMPARISON: None.     FINDINGS:     Acute left femoral neck fracture.  No acute dislocation of the left femoral head.  Mild osteoarthritis at the left hip joint  Mild osteoarthritis at the left knee.  No acute foreign body.       Impression:         Acute left femoral neck fracture.  No acute dislocation.  Osteoarthritis.     This report was finalized on 2/23/2025 12:19 AM by Eamon Omalley MD.       XR Hip With or Without Pelvis 2 - 3 View Left [[id number]] Collected: 02/23/25 0016     Updated: 02/23/25 0020    Narrative:      PROCEDURE: X-ray examination of the pelvis and left hip performed on  February 22, 2025. 3 views.     HISTORY: Fall. Pain.     COMPARISON: None.     FINDINGS:     Intact pelvis  Intact SI joints and intact pubic symphysis.  Mild osteoarthritis at the right and left hip joint.  Degenerative disc disease in the lower lumbar spine.  Acute left femoral neck fracture.   No acute dislocation of the left femoral head.       Impression:      Impression:     Acute left femoral neck fracture.   No acute dislocation of the left femoral head.  Mild osteoarthritis at the right and left hip joint.  Intact pelvis  No acute foreign body.     This report was finalized on 2/23/2025 12:18 AM by Eamon Omalley MD.       CT Cervical Spine Without Contrast [[id number]] Collected: 02/23/25 0015     Updated: 02/23/25 0018    Narrative:      PROCEDURE: CT of the cervical spine performed without IV contrast on  February 22, 2025. The examination was performed with 2 mm axial imaging  and 2 mm sagittal coronal reconstruction images. Examination was  performed according to as low as reasonably achievable dose protocol.     HISTORY: Fall. Head injury. Neck injury.     COMPARISON: None.     FINDINGS:     Anatomic alignment of the cervical vertebral bodies with no acute  fracture or dislocation.  Normal thickness of the prevertebral soft tissues  No lytic or blastic lesion.       Impression:         No acute fracture or  dislocation.   No apical pneumothorax.     This report was finalized on 2/23/2025 12:16 AM by Eamon Omalley MD.       CT Head Without Contrast [[id number]] Collected: 02/23/25 0012     Updated: 02/23/25 0017    Narrative:      PROCEDURE: CT brain performed without IV contrast on February 22, 2025.  The examination was performed with 3 mm axial imaging and 3 mm sagittal  and coronal reconstruction images. The examination was performed  according to as low as reasonably achievable dose protocol.     HISTORY: Fall. Head injury. Neck injury.     COMPARISON: None.     FINDINGS:     Moderate generalized brain volume loss  Mild to moderate chronic small vessel ischemic type changes in the white  matter.  Deep white matter parenchymal volume loss with associated ex vacuo  dilatation of the lateral ventricles and basal cisterns.  No acute intracranial hemorrhage, mass, infarct, or edema.  Mild mucosal thickening in the ethmoid air cells.  High jugular bulb noted on the right side.  Debris noted within the right and left external auditory canal, right  greater than left.  Left side mastoiditis  Minimal mucosal thickening in the ethmoid air cells  No fracture.  No scalp hematoma.       Impression:      Impression:     Moderate generalized brain volume loss with mild to moderate chronic  small vessel ischemic type changes in the white matter.  Ex-vacuo dilatation of the ventricular system due to underlying deep  white matter parenchymal volume loss  No acute intracranial hemorrhage, mass, infarct, or edema.  Minimal mucosal thickening in the ethmoid air cells  Partial opacification of the left mastoid air cells.  No fracture or dislocation.  No scalp hematoma.     This report was finalized on 2/23/2025 12:15 AM by Eamon Omalley MD.               Last echocardiogram:  No previous echo on file.     I have personally reviewed the above radiology images and read the final radiology report on  02/23/25  ---------------------------------------------------------------------------------------------------------------------  Assessment / Plan     Active Hospital Problems    Diagnosis  POA    **Hip fracture [S72.009A]  Yes       ASSESSMENT/PLAN:  #Acute non-displaced left femoral neck fracture s/p mechanical fall at home prior to arrival   #Leukocytosis (POA), likely reactive   #Severe Alzheimer's dementia  #Hypercholesterolemia  #Hypertension  #GERD  #Osteoarthritis  #Depression and anxiety  #Advanced age  -Radiological images reviewed.   -Orthopedic surgery consulted, input/assistance is much appreciated.   -PRN IV/PO pain medication available with holding parameters to prevent hypotension, oversedation, and/or respiratory depression.   -Continuous pulse oximetry ordered.   -Neurovascular checks Q 4 hours.   -NPO pending surgical evaluation/intervention.  -Strict Bedrest.   -Fall precautions.   -May utilize external urinary catheter.   -No home medications to review as patient reportedly has been off all medications for the past 2 years. Spouse states that he does occasionally give her a THC gummy.   -Provide reorientation/redirection as necessary.   -Utilize bed alarm.  -PT/OT consults for postoperative evaluation.   -Case management consulted for assistance with discharge plans.   -VS currently stable; continue to closely monitor VS per hospital policy.   -Repeat labs in the AM (CBC and CMP). Obtain PT-INR.   -Preop EKG ordered with no concern for acute ischemia; revealed NSR/.  -Preop COVID-19 and Flu swab ordered, pending.      ----------  -DVT prophylaxis: SCD (Right leg ONLY).   -Activity: Bedrest.   -Expected length of stay:   INPATIENT status due to the need for care which can only be reasonably provided in an hospital setting such as aggressive/expedited ancillary services and/or consultation services, the necessity for IV medications, close physician monitoring and/or the possible need for  procedures.  In such, I feel patient's risk for adverse outcomes and need for care warrant INPATIENT evaluation and predict the patient's care encounter to likely last beyond 2 midnights.   -Disposition pending clinical course.     High risk secondary to acute non-displaced left femoral neck fracture status post mechanical fall at home prior to arrival, severe Alzheimer's dementia, and advanced age.     CODE STATUS: DNR/DNI, discussed with spouse at bedside in ED (H101).       HUBERT Andrea   02/23/25  03:27 EST  Pager #[phone]  ---------------------------------------------------------------------------------------------------------------------        Electronically signed by Heriberto Arteaga MD at 02/23/25 0509          Emergency Department Notes        Anjelica Capps, APRN at 02/22/25 2029          Subjective   History of Present Illness  This is an 84 year old female patient who presents to the ER with chief complaint of fall.  presents with her and provides history as patient has dementia.  says he was leading her into the living room from the kitchen when she went in a direction he wasn't expecting and she fell hitting her head and her left hip. She has baseline difficulty with ambulation but now worsened since the fall. No LOC or syncope.         Review of Systems   Unable to perform ROS: Dementia       History reviewed. No pertinent past medical history.    Allergies   Allergen Reactions    Sulfa Antibiotics Hives       History reviewed. No pertinent surgical history.    History reviewed. No pertinent family history.    Social History     Socioeconomic History    Marital status:            Objective   Physical Exam  Vitals and nursing note reviewed.   Constitutional:       General: She is not in acute distress.     Appearance: She is well-developed. She is not diaphoretic.   HENT:      Head: Normocephalic.      Right Ear: External ear normal.      Left Ear: External ear  normal.      Nose: Nose normal.   Eyes:      Conjunctiva/sclera: Conjunctivae normal.      Pupils: Pupils are equal, round, and reactive to light.   Neck:      Vascular: No JVD.      Trachea: No tracheal deviation.   Cardiovascular:      Rate and Rhythm: Normal rate and regular rhythm.      Heart sounds: Normal heart sounds. No murmur heard.  Pulmonary:      Effort: Pulmonary effort is normal. No respiratory distress.      Breath sounds: Normal breath sounds. No wheezing.   Abdominal:      General: Bowel sounds are normal.      Palpations: Abdomen is soft.      Tenderness: There is no abdominal tenderness.   Musculoskeletal:         General: Tenderness and signs of injury present. No deformity. Normal range of motion.      Cervical back: Normal range of motion and neck supple.   Skin:     General: Skin is warm and dry.      Coloration: Skin is not pale.      Findings: No erythema or rash.   Neurological:      Mental Status: She is alert and oriented to person, place, and time.      Cranial Nerves: No cranial nerve deficit.   Psychiatric:         Behavior: Behavior normal.         Thought Content: Thought content normal.       Results for orders placed or performed during the hospital encounter of 02/22/25   Comprehensive Metabolic Panel    Collection Time: 02/23/25  1:55 AM    Specimen: Arm, Left; Blood   Result Value Ref Range    Glucose 171 (H) 65 - 99 mg/dL    BUN 14 8 - 23 mg/dL    Creatinine 0.81 0.57 - 1.00 mg/dL    Sodium 136 136 - 145 mmol/L    Potassium 3.8 3.5 - 5.2 mmol/L    Chloride 101 98 - 107 mmol/L    CO2 23.9 22.0 - 29.0 mmol/L    Calcium 9.1 8.6 - 10.5 mg/dL    Total Protein 7.0 6.0 - 8.5 g/dL    Albumin 4.1 3.5 - 5.2 g/dL    ALT (SGPT) 12 1 - 33 U/L    AST (SGOT) 16 1 - 32 U/L    Alkaline Phosphatase 100 39 - 117 U/L    Total Bilirubin 0.4 0.0 - 1.2 mg/dL    Globulin 2.9 gm/dL    A/G Ratio 1.4 g/dL    BUN/Creatinine Ratio 17.3 7.0 - 25.0    Anion Gap 11.1 5.0 - 15.0 mmol/L    eGFR 72.1 >60.0  mL/min/1.73   CBC Auto Differential    Collection Time: 02/23/25  1:55 AM    Specimen: Arm, Left; Blood   Result Value Ref Range    WBC 16.27 (H) 3.40 - 10.80 10*3/mm3    RBC 4.59 3.77 - 5.28 10*6/mm3    Hemoglobin 14.2 12.0 - 15.9 g/dL    Hematocrit 43.7 34.0 - 46.6 %    MCV 95.2 79.0 - 97.0 fL    MCH 30.9 26.6 - 33.0 pg    MCHC 32.5 31.5 - 35.7 g/dL    RDW 12.6 12.3 - 15.4 %    RDW-SD 44.5 37.0 - 54.0 fl    MPV 8.7 6.0 - 12.0 fL    Platelets 258 140 - 450 10*3/mm3    Neutrophil % 89.9 (H) 42.7 - 76.0 %    Lymphocyte % 5.1 (L) 19.6 - 45.3 %    Monocyte % 4.4 (L) 5.0 - 12.0 %    Eosinophil % 0.0 (L) 0.3 - 6.2 %    Basophil % 0.2 0.0 - 1.5 %    Immature Grans % 0.4 0.0 - 0.5 %    Neutrophils, Absolute 14.62 (H) 1.70 - 7.00 10*3/mm3    Lymphocytes, Absolute 0.83 0.70 - 3.10 10*3/mm3    Monocytes, Absolute 0.71 0.10 - 0.90 10*3/mm3    Eosinophils, Absolute 0.00 0.00 - 0.40 10*3/mm3    Basophils, Absolute 0.04 0.00 - 0.20 10*3/mm3    Immature Grans, Absolute 0.07 (H) 0.00 - 0.05 10*3/mm3    nRBC 0.0 0.0 - 0.2 /100 WBC   Type & Screen    Collection Time: 02/23/25  1:55 AM    Specimen: Arm, Left; Blood   Result Value Ref Range    ABO Type O     RH type Positive     Antibody Screen Negative     T&S Expiration Date 2/26/2025 11:59:59 PM    Green Top (Gel)    Collection Time: 02/23/25  1:55 AM   Result Value Ref Range    Extra Tube Hold for add-ons.    Lavender Top    Collection Time: 02/23/25  1:55 AM   Result Value Ref Range    Extra Tube hold for add-on    Gold Top - SST    Collection Time: 02/23/25  1:55 AM   Result Value Ref Range    Extra Tube Hold for add-ons.    Light Blue Top    Collection Time: 02/23/25  1:55 AM   Result Value Ref Range    Extra Tube Hold for add-ons.    ABO RH Specimen Verification    Collection Time: 02/23/25  2:22 AM    Specimen: Blood   Result Value Ref Range    ABO Type O     RH type Positive    ECG 12 Lead Pre-Op / Pre-Procedure    Collection Time: 02/23/25  2:25 AM   Result Value Ref Range     QT Interval 352 ms    QTC Interval 454 ms     XR Tibia Fibula 2 View Left    Result Date: 2/23/2025   Intact left tibia and fibula with no acute fracture or dislocation. 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 Mild osteoarthritis at the left tibiotalar joint. No foreign body.    This report was finalized on 2/23/2025 12:20 AM by Eamon Omalley MD.      XR Femur 2 View Left    Result Date: 2/23/2025   Acute left femoral neck fracture. No acute dislocation. Osteoarthritis.  This report was finalized on 2/23/2025 12:19 AM by Eamon Omalley MD.      XR Hip With or Without Pelvis 2 - 3 View Left    Result Date: 2/23/2025  Impression:  Acute left femoral neck fracture. No acute dislocation of the left femoral head. Mild osteoarthritis at the right and left hip joint. Intact pelvis No acute foreign body.  This report was finalized on 2/23/2025 12:18 AM by Eamon Omalley MD.      CT Cervical Spine Without Contrast    Result Date: 2/23/2025   No acute fracture or dislocation. No apical pneumothorax.  This report was finalized on 2/23/2025 12:16 AM by Eamon Omalley MD.      CT Head Without Contrast    Result Date: 2/23/2025  Impression:  Moderate generalized brain volume loss with mild to moderate chronic small vessel ischemic type changes in the white matter. Ex-vacuo dilatation of the ventricular system due to underlying deep white matter parenchymal volume loss No acute intracranial hemorrhage, mass, infarct, or edema. Minimal mucosal thickening in the ethmoid air cells Partial opacification of the left mastoid air cells. No fracture or dislocation. No scalp hematoma.  This report was finalized on 2/23/2025 12:15 AM by Eamon Omalley MD.         Procedures          ED Course  ED Course as of 02/23/25 0304   Sat Feb 22, 2025 2202 Signed out to Anjelica Capps NP at shift change  [MM]   Sun Feb 23, 2025   0045 Spoke to Dr. Cobb who accepts the patient as a consult. [KH]   0118 Spoke to Dr. Arteaga who accepts the  patient to the hospitalist team. [KH]      ED Course User Index  [KH] Anjelica Capps APRN  [MM] Mandy Ray PA                                                       Medical Decision Making  Amount and/or Complexity of Data Reviewed  Labs: ordered.  Radiology: ordered.  ECG/medicine tests: ordered.    Risk  Prescription drug management.        Final diagnoses:   Closed fracture of neck of left femur, initial encounter       ED Disposition  ED Disposition       ED Disposition   Decision to Admit    Condition   --    Comment   Level of Care: Med/Surg [1]   Diagnosis: Hip fracture [418069]   Certification: I Certify That Inpatient Hospital Services Are Medically Necessary For Greater Than 2 Midnights                 No follow-up provider specified.       Medication List      No changes were made to your prescriptions during this visit.            Anjelica Capps APRN  02/23/25 0304      Electronically signed by Anjelica Capps APRN at 02/23/25 0304       Current Facility-Administered Medications   Medication Dose Route Frequency Provider Last Rate Last Admin    acetaminophen (TYLENOL) tablet 650 mg  650 mg Oral Q4H PRN Perry Cobb MD        Or    acetaminophen (TYLENOL) 160 MG/5ML oral solution 650 mg  650 mg Oral Q4H PRN Perry Cobb MD        Or    acetaminophen (TYLENOL) suppository 650 mg  650 mg Rectal Q4H PRN Perry Cobb MD        sennosides-docusate (PERICOLACE) 8.6-50 MG per tablet 2 tablet  2 tablet Oral BID PRN Perry Cobb MD        And    polyethylene glycol (MIRALAX) packet 17 g  17 g Oral Daily PRN Perry oCbb MD        And    bisacodyl (DULCOLAX) EC tablet 5 mg  5 mg Oral Daily PRN Perry Cobb MD        And    bisacodyl (DULCOLAX) suppository 10 mg  10 mg Rectal Daily PRN Perry Cobb MD        cefTRIAXone (ROCEPHIN) 1,000 mg in sodium chloride 0.9 % 100 mL IVPB-VTB  1,000 mg Intravenous Q24H Thom Strickland MD        [START ON 2/24/2025] Enoxaparin Sodium  (LOVENOX) syringe 40 mg  40 mg Subcutaneous Q24H Perry Cobb MD        HYDROcodone-acetaminophen (NORCO) 5-325 MG per tablet 1 tablet  1 tablet Oral Q6H PRN Perry Cobb MD        morphine injection 1 mg  1 mg Intravenous Q4H PRN Perry Cobb MD        And    naloxone (NARCAN) injection 0.4 mg  0.4 mg Intravenous Q5 Min PRN Perry Cobb MD        ropivacaine 0.5 % 250 mg, Morphine 10 mg, cloNIDine 80 mcg, ketorolac 30 mg, EPINEPHrine 1 mg/mL 0.5 mg in sodium chloride 0.9 % 50 mL solution   Intra-articular Once Perry Cobb MD        sodium chloride 0.9 % flush 10 mL  10 mL Intravenous PRN Perry Cobb MD        sodium chloride 0.9 % flush 10 mL  10 mL Intravenous Q12H Perry Cobb MD        sodium chloride 0.9 % flush 10 mL  10 mL Intravenous PRN Perry Cobb MD        sodium chloride 0.9 % infusion 40 mL  40 mL Intravenous PRN Perry Cobb MD         Orders (last 24 hrs)        Start     Ordered    02/24/25 0900  Enoxaparin Sodium (LOVENOX) syringe 40 mg  Every 24 Hours         02/23/25 1138    02/23/25 1800  Ambulate Patient  2 Times Daily       02/23/25 1138    02/23/25 1800  cefTRIAXone (ROCEPHIN) 1,000 mg in sodium chloride 0.9 % 100 mL IVPB-VTB  Every 24 Hours         02/23/25 1200    02/23/25 1230  ceFAZolin 1000 mg IVPB in 100 mL NS (VTB)  Every 8 Hours,   Status:  Discontinued         02/23/25 1138    02/23/25 1139  Activity As Tolerated  Until Discontinued         02/23/25 1138    02/23/25 1139  Diet: Regular/House; Fluid Consistency: Thin (IDDSI 0)  Diet Effective Now         02/23/25 1138    02/23/25 1059  XR Pelvis 1 or 2 View  1 Time Imaging         02/23/25 1058    02/23/25 1058  Oxygen Therapy- Nasal Cannula; Titrate 1-6 LPM Per SpO2; 90 - 95%  Continuous,   Status:  Canceled         02/23/25 1057    02/23/25 1058  Continuous Pulse Oximetry  Continuous         02/23/25 1057    02/23/25 1058  POC Glucose STAT  STAT,   Status:  Canceled        Comments: Notify Anesthesia if blood  sugar is less than 80 mg/dL or greater than 180mg/dL      02/23/25 1057    02/23/25 1058  Vital signs every 5 minutes for 15 minutes, every 15 minutes thereafter.  Once,   Status:  Canceled         02/23/25 1057    02/23/25 1058  Call Anesthesiologist for additional IV Fluid bolus for Hypotension/Tachycardia  Until Discontinued,   Status:  Canceled         02/23/25 1057    02/23/25 1058  Notify Anesthesia of Any Acute Changes in Patient Condition  Continuous,   Status:  Canceled        Comments: Open Order Report to View Parameters Requiring Provider Notification    02/23/25 1057    02/23/25 1058  Notify Anesthesia for Unrelieved Pain  Continuous,   Status:  Canceled        Comments: Open Order Report to View Parameters Requiring Provider Notification    02/23/25 1057    02/23/25 1058  Once DC criteria to floor met, follow surgeon's orders.  Until Discontinued,   Status:  Canceled         02/23/25 1057    02/23/25 1058  Discharge patient from PACU when discharge criteria is met.  Until Discontinued,   Status:  Canceled         02/23/25 1057    02/23/25 1057  oxyCODONE-acetaminophen (PERCOCET) 5-325 MG per tablet 1 tablet  Once As Needed,   Status:  Discontinued         02/23/25 1057    02/23/25 1057  fentaNYL citrate (PF) (SUBLIMAZE) injection 50 mcg  Every 5 Minutes PRN,   Status:  Discontinued         02/23/25 1057    02/23/25 1057  ondansetron (ZOFRAN) injection 4 mg  As Needed,   Status:  Discontinued         02/23/25 1057    02/23/25 1057  ipratropium-albuterol (DUO-NEB) nebulizer solution 3 mL  Once As Needed,   Status:  Discontinued         02/23/25 1057    02/23/25 0958  sodium chloride (NS) irrigation solution  As Needed,   Status:  Discontinued         02/23/25 0958    02/23/25 0914  ceFAZolin 2000 mg IVPB in 100 mL NS (VTB)  Once         02/23/25 0912    02/23/25 0900  sodium chloride 0.9 % flush 10 mL  Every 12 Hours Scheduled         02/23/25 0435    02/23/25 0900  sodium chloride 0.9 % flush 10 mL   Every 12 Hours Scheduled,   Status:  Discontinued         02/23/25 0827    02/23/25 0845  ropivacaine 0.5 % 250 mg, Morphine 10 mg, cloNIDine 80 mcg, ketorolac 30 mg, EPINEPHrine 1 mg/mL 0.5 mg in sodium chloride 0.9 % 50 mL solution  Once         02/23/25 0749    02/23/25 0840  Urine Culture - Urine, Urine, Clean Catch  Once        Comments: Please ensure 'Use Existing Specimen' is selected if this order is for urine culture add-on.  If no button appears, please contact the lab for assistance.      02/23/25 0839    02/23/25 0831  ethyl alcohol 62 % 2 each  Once         02/23/25 0829    02/23/25 0831  cefTRIAXone (ROCEPHIN) 1,000 mg in sodium chloride 0.9 % 100 mL IVPB-VTB  Every 24 Hours,   Status:  Discontinued         02/23/25 0830    02/23/25 0831  Urinalysis With Culture If Indicated - Urine, Clean Catch  Once,   Status:  Canceled        Comments: Please ensure 'Use Existing Specimen' is selected if this order is for urine culture add-on.  If no button appears, please contact the lab for assistance.      02/23/25 0830    02/23/25 0829  lactated ringers infusion  Once,   Status:  Discontinued         02/23/25 0827 02/23/25 0828  Oxygen Therapy- Nasal Cannula; Titrate 1-6 LPM Per SpO2; 90 - 95%  Continuous,   Status:  Canceled         02/23/25 0827 02/23/25 0828  Continuous Pulse Oximetry  Continuous,   Status:  Canceled         02/23/25 0827 02/23/25 0828  POC Glucose Once  Once,   Status:  Canceled        Comments: For all diabetic patients and all patients who are to be admitted. Notify Anesthesiologist for blood sugar > 180.      02/23/25 0827 02/23/25 0828  Insert Peripheral IV  Once,   Status:  Canceled         02/23/25 0827 02/23/25 0828  Saline Lock & Maintain IV Access  Continuous,   Status:  Canceled         02/23/25 0827 02/23/25 0828  May take Beta Blocker from home with sip of water.  Once,   Status:  Canceled         02/23/25 0827 02/23/25 0827  Vital Signs - Per Anesthesia  Protocol  As Needed,   Status:  Canceled       02/23/25 0827 02/23/25 0827  sodium chloride 0.9 % flush 10 mL  As Needed,   Status:  Discontinued         02/23/25 0827 02/23/25 0827  sodium chloride 0.9 % infusion 40 mL  As Needed,   Status:  Discontinued         02/23/25 0827 02/23/25 0827  midazolam (VERSED) injection 0.5 mg  Every 10 Minutes PRN,   Status:  Discontinued         02/23/25 0827    02/23/25 0816  CPR - Full Support in OR  Once,   Status:  Canceled         02/23/25 0815    02/23/25 0800  Vital Signs  Every 4 Hours       02/23/25 0435    02/23/25 0800  Oral Care  2 Times Daily       02/23/25 0435    02/23/25 0800  Neurovascular Checks  Every 4 Hours       02/23/25 0435    02/23/25 0750  Obtain Informed Consent  Once         02/23/25 0749    02/23/25 0749  Case request  Once         02/23/25 0749    02/23/25 0603  [place] Urine Culture Tube - Urine, Clean Catch  Once         02/23/25 0602    02/23/25 0600  ABO RH Specimen Verification  Morning Draw         02/23/25 0212    02/23/25 0600  CBC Auto Differential  Morning Draw         02/23/25 0435    02/23/25 0600  Comprehensive Metabolic Panel  Morning Draw         02/23/25 0435    02/23/25 0600  Protime-INR  Morning Draw         02/23/25 0435    02/23/25 0545  Urinalysis, Microscopic Only - Urine, Clean Catch  Once         02/23/25 0544    02/23/25 0454  Inpatient Case Management  Consult  Once        Provider:  (Not yet assigned)    02/23/25 0453    02/23/25 0436  Intake & Output  Every Shift       02/23/25 0435    02/23/25 0436  Weigh Patient  Once         02/23/25 0435    02/23/25 0436  Insert Peripheral IV  Once         02/23/25 0435    02/23/25 0436  Saline Lock & Maintain IV Access  Continuous,   Status:  Canceled         02/23/25 0435    02/23/25 0436  Place Sequential Compression Device  Once        Comments: RIGHT LEG ONLY.    02/23/25 0435    02/23/25 0436  Maintain Sequential Compression Device  Continuous         Comments: RIGHT LEG ONLY.    02/23/25 0435 02/23/25 0436  Continuous Pulse Oximetry  Continuous,   Status:  Canceled         02/23/25 0435 02/23/25 0436  Activity - Bed Rest With Exceptions (Specify)  Until Discontinued,   Status:  Canceled         02/23/25 0435 02/23/25 0436  Notify Provider (With Default Parameters)  Continuous        Comments: Open Order Report to View Parameters Requiring Provider Notification    02/23/25 0435 02/23/25 0436  NPO Diet NPO Type: Strict NPO  Diet Effective Now,   Status:  Canceled         02/23/25 0435 02/23/25 0436  TSH  Once         02/23/25 0435 02/23/25 0436  Magnesium  STAT         02/23/25 0435 02/23/25 0436  Aspiration Precautions  Continuous         02/23/25 0435 02/23/25 0436  Fall Precautions  Continuous         02/23/25 0435 02/23/25 0436  PT Consult: Eval & Treat Functional Mobility Below Baseline, Discharge Placement Assessment, Post Surgery Care  Once         02/23/25 0435 02/23/25 0436  OT Consult: Eval & Treat ADL Performance Below Baseline, Discharge Placement Assessment, Post-Surgery Care  Once         02/23/25 0435 02/23/25 0436  COVID PRE-OP / PRE-PROCEDURE SCREENING ORDER (NO ISOLATION) - Swab, Nasopharynx  Once         02/23/25 0435 02/23/25 0436  Urinalysis With Microscopic If Indicated (No Culture) - Urine, Clean Catch  Once         02/23/25 0435 02/23/25 0436  Apply External Urinary Catheter  Once         02/23/25 0435 02/23/25 0436  COVID-19 and FLU A/B PCR, 1 HR TAT - Swab, Nasopharynx  PROCEDURE ONCE         02/23/25 0435 02/23/25 0435  sodium chloride 0.9 % flush 10 mL  As Needed         02/23/25 0435 02/23/25 0435  sodium chloride 0.9 % infusion 40 mL  As Needed         02/23/25 0435 02/23/25 0435  Oxygen Therapy- Nasal Cannula; Titrate 1-6 LPM Per SpO2; 90 - 95%  Continuous PRN,   Status:  Canceled       02/23/25 0435 02/23/25 0435  acetaminophen (TYLENOL) tablet 650 mg  Every 4 Hours PRN       "  Placed in \"Or\" Linked Group    02/23/25 0435    02/23/25 0435  acetaminophen (TYLENOL) 160 MG/5ML oral solution 650 mg  Every 4 Hours PRN        Placed in \"Or\" Linked Group    02/23/25 0435    02/23/25 0435  acetaminophen (TYLENOL) suppository 650 mg  Every 4 Hours PRN        Placed in \"Or\" Linked Group    02/23/25 0435    02/23/25 0435  sennosides-docusate (PERICOLACE) 8.6-50 MG per tablet 2 tablet  2 Times Daily PRN        Placed in \"And\" Linked Group    02/23/25 0435    02/23/25 0435  polyethylene glycol (MIRALAX) packet 17 g  Daily PRN        Placed in \"And\" Linked Group    02/23/25 0435    02/23/25 0435  bisacodyl (DULCOLAX) EC tablet 5 mg  Daily PRN        Placed in \"And\" Linked Group    02/23/25 0435    02/23/25 0435  bisacodyl (DULCOLAX) suppository 10 mg  Daily PRN        Placed in \"And\" Linked Group    02/23/25 0435    02/23/25 0435  morphine injection 1 mg  Every 4 Hours PRN        Placed in \"And\" Linked Group    02/23/25 0435    02/23/25 0435  naloxone (NARCAN) injection 0.4 mg  Every 5 Minutes PRN        Placed in \"And\" Linked Group    02/23/25 0435    02/23/25 0435  HYDROcodone-acetaminophen (NORCO) 5-325 MG per tablet 1 tablet  Every 6 Hours PRN         02/23/25 0435    02/23/25 0334  morphine injection 2 mg  Once         02/23/25 0318    02/23/25 0334  ondansetron (ZOFRAN) injection 4 mg  Once         02/23/25 0318    02/23/25 0300  Code Status and Medical Interventions: No CPR (Do Not Attempt to Resuscitate); Limited Support; No intubation (DNI)  Continuous         02/23/25 0301    02/23/25 0259  Inpatient Admission  Once         02/23/25 0258    02/23/25 0154  Inpatient Orthopedic Surgery Consult  Once        Specialty:  Orthopedic Surgery  Provider:  Perry Cobb MD    02/23/25 0153    02/23/25 0116  XR Chest 1 View  1 Time Imaging         02/23/25 0115    02/23/25 0115  Insert Peripheral IV  Once        Placed in \"And\" Linked Group    02/23/25 0114    02/23/25 0115  ECG 12 Lead Pre-Op / " "Pre-Procedure  Once         02/23/25 0114    02/23/25 0115  Type & Screen  Once         02/23/25 0114    02/23/25 0114  sodium chloride 0.9 % flush 10 mL  As Needed        Placed in \"And\" Linked Group    02/23/25 0114    02/23/25 0114  CBC & Differential  Once         02/23/25 0114 02/23/25 0114  Comprehensive Metabolic Panel  Once         02/23/25 0114 02/23/25 0114  [place] Draw  Once         02/23/25 0114    02/23/25 0114  CBC Auto Differential  PROCEDURE ONCE         02/23/25 0115    02/23/25 0114  Green Top (Gel)  PROCEDURE ONCE         02/23/25 0115    02/23/25 0114  Lavender Top  PROCEDURE ONCE         02/23/25 0115 02/23/25 0114  Gold Top - SST  PROCEDURE ONCE         02/23/25 0115 02/23/25 0114  Light Blue Top  PROCEDURE ONCE         02/23/25 0115 02/22/25 2038  XR Tibia Fibula 2 View Left  1 Time Imaging         02/22/25 2038 02/22/25 2029  XR Hip With or Without Pelvis 2 - 3 View Left  1 Time Imaging         02/22/25 2029 02/22/25 2029  XR Femur 2 View Left  1 Time Imaging         02/22/25 2029 02/22/25 2029  CT Head Without Contrast  1 Time Imaging         02/22/25 2029 02/22/25 2029  CT Cervical Spine Without Contrast  1 Time Imaging         02/22/25 2029                  Physician Progress Notes (last 24 hours)  Notes from 02/22/25 1217 through 02/23/25 1217   No notes of this type exist for this encounter.          Consult Notes (last 24 hours)        Perry Cobb MD at 02/23/25 0906        Consult Orders    1. Inpatient Orthopedic Surgery Consult [[id number]] ordered by Aysha Cardoza APRN at 02/23/25 0153                 Consult Note    Reason for Consultation: Left hip fracture    Chief complaint left hip pain x 1 day    History of present illness: History of Present Illness    The patient is an 84-year-old who has left hip pain after a fall from standing height.  She was ambulating in her kitchen last night with her significant other tripped and fell onto her left " side.  She has left hip pain.  She does ambulate with a walker and a cane and with assistance by her significant other.  The significant other reports significant pain but pain has improved since IV pain Bacid.  She has no previous history of a surgery or pain to the left hip.    The patient was minimally verbal during this encounter.  Majority of the history was obtained from the nursing staff and the significant other.      Review of Systems  A complete review of systems was completed and was negative except for what is noted in the HPI.    History  History reviewed. No pertinent past medical history., History reviewed. No pertinent surgical history., History reviewed. No pertinent family history.,   Social History     Tobacco Use    Smoking status: Never     Passive exposure: Past    Smokeless tobacco: Never   Vaping Use    Vaping status: Never Used   Substance Use Topics    Alcohol use: Never    Drug use: Never   ,   No medications prior to admission.   , Scheduled Meds:  cefTRIAXone, 1,000 mg, Intravenous, Q24H  lactated ringers, 125 mL/hr, Intravenous, Once  [Transfer Hold] ropivacaine 0.5 % 250 mg, Morphine 10 mg, cloNIDine 80 mcg, ketorolac 30 mg, EPINEPHrine 1 mg/mL 0.5 mg in sodium chloride 0.9 % 50 mL solution, , Intra-articular, Once  sodium chloride, 10 mL, Intravenous, Q12H  sodium chloride, 10 mL, Intravenous, Q12H    , Continuous Infusions:   , PRN Meds:    acetaminophen **OR** acetaminophen **OR** acetaminophen    senna-docusate sodium **AND** polyethylene glycol **AND** bisacodyl **AND** bisacodyl    HYDROcodone-acetaminophen    midazolam    Morphine **AND** naloxone    [COMPLETED] Insert Peripheral IV **AND** sodium chloride    sodium chloride    sodium chloride    sodium chloride    sodium chloride and Allergies:  Sulfa antibiotics    Objective     Vital Signs   Temp:  [97.8 °F (36.6 °C)-98.6 °F (37 °C)] 97.8 °F (36.6 °C)  Heart Rate:  [] 98  Resp:  [17-18] 17  BP: (115-167)/(54-89)  132/64    Physical Exam:  Constitutional:  Alert. Well developed and well nourished. No acute distress.      HENT:  Head: Normocephalic and atraumatic.  Mouth:  Moist mucous membranes.    Eyes:  Conjunctivae and EOM are normal.  No scleral icterus.  Neck:  Neck supple.  No JVD present.   Cardiovascular:  +2 radial, +2 dorsalis pedis, +2 posterior tibialis bilaterally   Pulmonary/Chest:  No respiratory distress. Adequate volumes noted.   Abdominal:  Soft.  No distension and no tenderness.   Musculoskeletal:     Spine- No c/t/l/s spine tenderness, Cervical ROM without pain   RUE- Painless ROM shoulder/elbow/wrist/fingers, no edema, no ecchymosis, no gross deformity, no open injury   LUE- Painless ROM shoulder/elbow/wrist/fingers, no edema, no ecchymosis, no gross deformity, no open injury   RLE- Painless ROM hip/knee/ankle, no edema, no ecchymosis, no gross deformity, no open injury   LLE-shortened externally rotated, positive logroll and axial load   Pelvis- negative pelvic rock   Neurological: SILT Ax/LABC/m/r/u, able to perform shoulder abduction/elbow flex-ext/wrist flex-ext/finger-thumb flex-ext-abd-add, SILT s/s/dp/sp/t, able to perform ankle DF/PF/EHL  Skin:  Skin is warm and dry.  No rash noted.  No pallor.     Labs:    Lab Results (last 72 hours)       Procedure Component Value Units Date/Time    Urine Culture - Urine, Urine, Clean Catch [[id number]] Collected: 02/23/25 0537    Specimen: Urine, Clean Catch Updated: 02/23/25 0839    [place] Urine Culture Tube - Urine, Clean Catch [[id number]] Collected: 02/23/25 0537    Specimen: Urine, Clean Catch Updated: 02/23/25 0603     Extra Tube Hold for add-ons.     Comment: Auto resulted.       Urinalysis, Microscopic Only - Urine, Clean Catch [[id number]]  (Abnormal) Collected: 02/23/25 0537    Specimen: Urine, Clean Catch Updated: 02/23/25 0602     RBC, UA None Seen /HPF      WBC, UA 6-10 /HPF      Bacteria, UA 4+ /HPF      Squamous Epithelial Cells, UA 0-2 /HPF       Hyaline Casts, UA None Seen /LPF      Methodology Manual Light Microscopy    Urinalysis With Microscopic If Indicated (No Culture) - Urine, Clean Catch [[id number]]  (Abnormal) Collected: 02/23/25 0537    Specimen: Urine, Clean Catch Updated: 02/23/25 0558     Color, UA Yellow     Appearance, UA Clear     pH, UA 8.0     Specific Gravity, UA 1.018     Glucose, UA Negative     Ketones, UA Negative     Bilirubin, UA Negative     Blood, UA Negative     Protein, UA Negative     Leuk Esterase, UA Small (1+)     Nitrite, UA Positive     Urobilinogen, UA 1.0 E.U./dL    Comprehensive Metabolic Panel [[id number]]  (Abnormal) Collected: 02/23/25 0508    Specimen: Blood Updated: 02/23/25 0534     Glucose 147 mg/dL      BUN 12 mg/dL      Creatinine 0.77 mg/dL      Sodium 137 mmol/L      Potassium 3.9 mmol/L      Chloride 102 mmol/L      CO2 25.5 mmol/L      Calcium 8.7 mg/dL      Total Protein 6.6 g/dL      Albumin 4.1 g/dL      ALT (SGPT) 12 U/L      AST (SGOT) 17 U/L      Alkaline Phosphatase 93 U/L      Total Bilirubin 0.4 mg/dL      Globulin 2.5 gm/dL      A/G Ratio 1.6 g/dL      BUN/Creatinine Ratio 15.6     Anion Gap 9.5 mmol/L      eGFR 76.6 mL/min/1.73     Narrative:      GFR Categories in Chronic Kidney Disease (CKD)      GFR Category          GFR (mL/min/1.73)    Interpretation  G1                     90 or greater         Normal or high (1)  G2                      60-89                Mild decrease (1)  G3a                   45-59                Mild to moderate decrease  G3b                   30-44                Moderate to severe decrease  G4                    15-29                Severe decrease  G5                    14 or less           Kidney failure          (1)In the absence of evidence of kidney disease, neither GFR category G1 or G2 fulfill the criteria for CKD.    eGFR calculation 2021 CKD-EPI creatinine equation, which does not include race as a factor    Protime-INR [[id number]]  (Normal) Collected:  02/23/25 0508    Specimen: Blood Updated: 02/23/25 0522     Protime 13.0 Seconds      INR 0.97    Narrative:      Suggested INR therapeutic range for stable oral anticoagulant therapy:    Low Intensity therapy:   1.5-2.0  Moderate Intensity therapy:   2.0-3.0  High Intensity therapy:   2.5-4.0    CBC Auto Differential [[id number]]  (Abnormal) Collected: 02/23/25 0508    Specimen: Blood Updated: 02/23/25 0513     WBC 14.15 10*3/mm3      RBC 4.41 10*6/mm3      Hemoglobin 13.8 g/dL      Hematocrit 42.5 %      MCV 96.4 fL      MCH 31.3 pg      MCHC 32.5 g/dL      RDW 12.6 %      RDW-SD 45.2 fl      MPV 8.7 fL      Platelets 247 10*3/mm3      Neutrophil % 85.2 %      Lymphocyte % 8.6 %      Monocyte % 5.2 %      Eosinophil % 0.0 %      Basophil % 0.4 %      Immature Grans % 0.6 %      Neutrophils, Absolute 12.06 10*3/mm3      Lymphocytes, Absolute 1.21 10*3/mm3      Monocytes, Absolute 0.74 10*3/mm3      Eosinophils, Absolute 0.00 10*3/mm3      Basophils, Absolute 0.06 10*3/mm3      Immature Grans, Absolute 0.08 10*3/mm3      nRBC 0.0 /100 WBC     TSH [[id number]]  (Normal) Collected: 02/23/25 0155    Specimen: Blood from Arm, Left Updated: 02/23/25 0458     TSH 1.800 uIU/mL     Magnesium [[id number]]  (Normal) Collected: 02/23/25 0155    Specimen: Blood from Arm, Left Updated: 02/23/25 0445     Magnesium 2.3 mg/dL     Comprehensive Metabolic Panel [[id number]]  (Abnormal) Collected: 02/23/25 0155    Specimen: Blood from Arm, Left Updated: 02/23/25 0222     Glucose 171 mg/dL      BUN 14 mg/dL      Creatinine 0.81 mg/dL      Sodium 136 mmol/L      Potassium 3.8 mmol/L      Chloride 101 mmol/L      CO2 23.9 mmol/L      Calcium 9.1 mg/dL      Total Protein 7.0 g/dL      Albumin 4.1 g/dL      ALT (SGPT) 12 U/L      AST (SGOT) 16 U/L      Alkaline Phosphatase 100 U/L      Total Bilirubin 0.4 mg/dL      Globulin 2.9 gm/dL      A/G Ratio 1.4 g/dL      BUN/Creatinine Ratio 17.3     Anion Gap 11.1 mmol/L      eGFR 72.1 mL/min/1.73      Narrative:      GFR Categories in Chronic Kidney Disease (CKD)      GFR Category          GFR (mL/min/1.73)    Interpretation  G1                     90 or greater         Normal or high (1)  G2                      60-89                Mild decrease (1)  G3a                   45-59                Mild to moderate decrease  G3b                   30-44                Moderate to severe decrease  G4                    15-29                Severe decrease  G5                    14 or less           Kidney failure          (1)In the absence of evidence of kidney disease, neither GFR category G1 or G2 fulfill the criteria for CKD.    eGFR calculation 2021 CKD-EPI creatinine equation, which does not include race as a factor    CBC & Differential [[id number]]  (Abnormal) Collected: 02/23/25 0155    Specimen: Blood from Arm, Left Updated: 02/23/25 0201    Narrative:      The following orders were created for panel order CBC & Differential.  Procedure                               Abnormality         Status                     ---------                               -----------         ------                     CBC Auto Differential[[id number]]        Abnormal            Final result                 Please view results for these tests on the individual orders.    CBC Auto Differential [[id number]]  (Abnormal) Collected: 02/23/25 0155    Specimen: Blood from Arm, Left Updated: 02/23/25 0201     WBC 16.27 10*3/mm3      RBC 4.59 10*6/mm3      Hemoglobin 14.2 g/dL      Hematocrit 43.7 %      MCV 95.2 fL      MCH 30.9 pg      MCHC 32.5 g/dL      RDW 12.6 %      RDW-SD 44.5 fl      MPV 8.7 fL      Platelets 258 10*3/mm3      Neutrophil % 89.9 %      Lymphocyte % 5.1 %      Monocyte % 4.4 %      Eosinophil % 0.0 %      Basophil % 0.2 %      Immature Grans % 0.4 %      Neutrophils, Absolute 14.62 10*3/mm3      Lymphocytes, Absolute 0.83 10*3/mm3      Monocytes, Absolute 0.71 10*3/mm3      Eosinophils, Absolute 0.00 10*3/mm3       Basophils, Absolute 0.04 10*3/mm3      Immature Grans, Absolute 0.07 10*3/mm3      nRBC 0.0 /100 WBC     [place] Draw [[id number]] Collected: 02/23/25 0155    Specimen: Blood from Arm, Left Updated: 02/23/25 0200    Narrative:      The following orders were created for panel order [place] Draw.  Procedure                               Abnormality         Status                     ---------                               -----------         ------                     Green Top (Gel)[[id number]]                                  Final result               Lavender Top[[id number]]                                     Final result               Gold Top - SST[[id number]]                                   Final result               Light Blue Top[[id number]]                                   Final result                 Please view results for these tests on the individual orders.    Green Top (Gel) [[id number]] Collected: 02/23/25 0155    Specimen: Blood from Arm, Left Updated: 02/23/25 0200     Extra Tube Hold for add-ons.     Comment: Auto resulted.       Lavender Top [[id number]] Collected: 02/23/25 0155    Specimen: Blood from Arm, Left Updated: 02/23/25 0200     Extra Tube hold for add-on     Comment: Auto resulted       Gold Top - SST [[id number]] Collected: 02/23/25 0155    Specimen: Blood from Arm, Left Updated: 02/23/25 0200     Extra Tube Hold for add-ons.     Comment: Auto resulted.       Light Blue Top [[id number]] Collected: 02/23/25 0155    Specimen: Blood from Arm, Left Updated: 02/23/25 0200     Extra Tube Hold for add-ons.     Comment: Auto resulted               Images:    Left hip and femur x-rays reviewed.  Femoral neck fracture displaced as noted.  No other fracture.  Mild arthritic changes at the hip joint.       Assessment  Left femoral neck fracture, displaced    Plan    Due to the patient's acute nature of her injury and ambulatory status I would recommend a left hip hemiarthroplasty.    This was discussed  significantly with the patient's significant other.    Risk benefits alternatives and complications was discussed with the patient prior to surgery as well as the patient's significant other.    Maintain n.p.o. status for now.  Anticipate surgery 2-.      Perry Cobb MD  02/23/25  09:07 EST      Electronically signed by Perry Cobb MD at 02/23/25 6619

## 2025-02-23 NOTE — OP NOTE
Crista Blunt  2/23/2025      Operative Note:    Surgeon: Perry Cobb MD    Assistant: MODESTA Ng    Preoperative Diagnosis:   Left femoral neck fracture, displaced    Postoperative Diagnosis:   Same    Procedure(s):    1.  Left hip hemiarthroplasty for fracture, CPT code 31606    Anesthesia: General, local injection    Estimated Blood Loss: 100 cc    Specimen Obtained:  None    Complication(s):  None apparent.     Implants:  Depuy Epyphyses stem 3, standard offsett, 0 neck, 47 endo head     Operative Indication:     The patient is a 84-year-old who fell from standing height sustained the above injury.  Due to her ambulatory status and acute nature of her injury she elected to proceed with the above operation.  Risk benefits alternatives and complications was discussed with the patient prior to surgery.  This was also discussed with her family.    Operative Details:    The patient was met in the preoperative suite where the correct operative site was marked. The patient was brought to the operating room where anesthesia was initiated. The patient was positioned appropriately with all bony prominences well-padded. The patient was prepped and draped in the usual sterile fashion and prior to incision a time out was observed to verify the correct operative site, procedure and antibiotics.    A longitudinal incision was taken on the lateral aspect of the proximal femur. This was taken down to the IT band. Hemostasis was achieved with electrocautery. The IT band was incised in line with the incision. The abductors were then released on the anterior two-thirds and tagged for later repair. Hohmann retractors were placed around the capsule and an H capsulotomy was performed. The leg was then externally rotated and the foot was placed on the side of the table.     An oscillating saw was utilized to create a femoral neck osteotomy and the bone was removed. Attention was brought back to the acetabulum where a corkscrew was used  to remove the femoral head. The femoral head was then sized.    Attention was brought back to the proximal femur where sequential broaching was performed until an appropriate fit. A trial implant was then placed. No subluxation was noted with passive range of motion and a minimal shuck was noted. Limb lengths appeared equal. Trial implants were removed.     The canal was then prepped and irrigated. The stem was cemented into position and the final implant impacted into position. The final implants were reduced which showed a similar exam compared to the trial implants.     The wound was irrigated thoroughly with saline. The capsule was repaired along with the abductors and IT band.The remainder of the wound was closed in standard layered fashion and a soft dressing was applied. The patient was extubated, transferred to the hospital bed and into PACU in stable condition. The patient tolerated the procedure well without any complications.     Postoperative Plan:    Transfer to floor  Dressing- maintain dressing for now, ok to reinforce prn saturation  Weightbearing/Lifting Status- as tolerated, anterior hip precautions, no active abduction   DVT PPx-Lovenox 40 once daily for 14 days  Follow up-2 weeks to the office at discharge    Electronically Signed by: Perry Cobb MD

## 2025-02-23 NOTE — PROGRESS NOTES
Ms. Blunt is our 85 yo F with hx of  severe Alzheimer's dementia, hypercholesterolemia, hypertension, GERD, osteoarthritis, depression, anxiety, and advanced age who presented after a fall found to have a left hip fracture. I seen patient postoperatively after nursing staff reported patient was hypotensive in 80's/50's. HR 80's, saturating well on room air. Patient initially only responsive to pain. 500 cc bolus ordered. As I was talking with family patient woke up and was interactive answering questions appropriately, oriented to self. No focal deficit. Patient's  was bedside and due to initial low GCS discussed code status. He believed patient would not want life support including mechanical ventilation but would want chest compressions. I discussed this with him and his daughter. His daughter did not think her mom would want compressions. Patient's  recalled time he had anaphylaxis recently and got chest compressions. I encouraged him to consider to discuss and consider in patient with advanced dementia. ABG did not show hypercapnia, showed mild hypoxia. Will place 2L NC. EKG normal. Blood pressure after first 500 cc bolus was 100/60. Will get chest x-ray and PCR. Patient does appear to have UTI, started on treatment with ceftriaxone.

## 2025-02-23 NOTE — ANESTHESIA PREPROCEDURE EVALUATION
Anesthesia Evaluation     Patient summary reviewed and Nursing notes reviewed   no history of anesthetic complications:   NPO Solid Status: > 8 hours  NPO Liquid Status: > 8 hours           Airway   Mallampati: II  TM distance: >3 FB  Neck ROM: full  No difficulty expected  Dental    (+) poor dentition    Pulmonary - negative pulmonary ROS and normal exam   Cardiovascular - negative cardio ROS and normal exam        Neuro/Psych  (+) dementia  GI/Hepatic/Renal/Endo - negative ROS     Musculoskeletal (-) negative ROS    Abdominal  - normal exam    Bowel sounds: normal.   Substance History - negative use     OB/GYN negative ob/gyn ROS         Other                    Anesthesia Plan    ASA 3     general     intravenous induction     Anesthetic plan, risks, benefits, and alternatives have been provided, discussed and informed consent has been obtained with: spouse/significant other.    CODE STATUS:    Medical Intervention Limits: No intubation (DNI)  Level Of Support Discussed With: Next of Kin (If No Surrogate)  Code Status (Patient has no pulse and is not breathing): No CPR (Do Not Attempt to Resuscitate)  Medical Interventions (Patient has pulse or is breathing): Limited Support

## 2025-02-23 NOTE — CONSULTS
Consult Note    Reason for Consultation: Left hip fracture    Chief complaint left hip pain x 1 day    History of present illness: History of Present Illness    The patient is an 84-year-old who has left hip pain after a fall from standing height.  She was ambulating in her kitchen last night with her significant other tripped and fell onto her left side.  She has left hip pain.  She does ambulate with a walker and a cane and with assistance by her significant other.  The significant other reports significant pain but pain has improved since IV pain Bacid.  She has no previous history of a surgery or pain to the left hip.    The patient was minimally verbal during this encounter.  Majority of the history was obtained from the nursing staff and the significant other.      Review of Systems  A complete review of systems was completed and was negative except for what is noted in the HPI.    History  History reviewed. No pertinent past medical history., History reviewed. No pertinent surgical history., History reviewed. No pertinent family history.,   Social History     Tobacco Use    Smoking status: Never     Passive exposure: Past    Smokeless tobacco: Never   Vaping Use    Vaping status: Never Used   Substance Use Topics    Alcohol use: Never    Drug use: Never   ,   No medications prior to admission.   , Scheduled Meds:  cefTRIAXone, 1,000 mg, Intravenous, Q24H  lactated ringers, 125 mL/hr, Intravenous, Once  [Transfer Hold] ropivacaine 0.5 % 250 mg, Morphine 10 mg, cloNIDine 80 mcg, ketorolac 30 mg, EPINEPHrine 1 mg/mL 0.5 mg in sodium chloride 0.9 % 50 mL solution, , Intra-articular, Once  sodium chloride, 10 mL, Intravenous, Q12H  sodium chloride, 10 mL, Intravenous, Q12H    , Continuous Infusions:   , PRN Meds:    acetaminophen **OR** acetaminophen **OR** acetaminophen    senna-docusate sodium **AND** polyethylene glycol **AND** bisacodyl **AND** bisacodyl    HYDROcodone-acetaminophen    midazolam    Morphine  **AND** naloxone    [COMPLETED] Insert Peripheral IV **AND** sodium chloride    sodium chloride    sodium chloride    sodium chloride    sodium chloride and Allergies:  Sulfa antibiotics    Objective     Vital Signs   Temp:  [97.8 °F (36.6 °C)-98.6 °F (37 °C)] 97.8 °F (36.6 °C)  Heart Rate:  [] 98  Resp:  [17-18] 17  BP: (115-167)/(54-89) 132/64    Physical Exam:  Constitutional:  Alert. Well developed and well nourished. No acute distress.      HENT:  Head: Normocephalic and atraumatic.  Mouth:  Moist mucous membranes.    Eyes:  Conjunctivae and EOM are normal.  No scleral icterus.  Neck:  Neck supple.  No JVD present.   Cardiovascular:  +2 radial, +2 dorsalis pedis, +2 posterior tibialis bilaterally   Pulmonary/Chest:  No respiratory distress. Adequate volumes noted.   Abdominal:  Soft.  No distension and no tenderness.   Musculoskeletal:     Spine- No c/t/l/s spine tenderness, Cervical ROM without pain   RUE- Painless ROM shoulder/elbow/wrist/fingers, no edema, no ecchymosis, no gross deformity, no open injury   LUE- Painless ROM shoulder/elbow/wrist/fingers, no edema, no ecchymosis, no gross deformity, no open injury   RLE- Painless ROM hip/knee/ankle, no edema, no ecchymosis, no gross deformity, no open injury   LLE-shortened externally rotated, positive logroll and axial load   Pelvis- negative pelvic rock   Neurological: SILT Ax/LABC/m/r/u, able to perform shoulder abduction/elbow flex-ext/wrist flex-ext/finger-thumb flex-ext-abd-add, SILT s/s/dp/sp/t, able to perform ankle DF/PF/EHL  Skin:  Skin is warm and dry.  No rash noted.  No pallor.     Labs:    Lab Results (last 72 hours)       Procedure Component Value Units Date/Time    Urine Culture - Urine, Urine, Clean Catch [[id number]] Collected: 02/23/25 0537    Specimen: Urine, Clean Catch Updated: 02/23/25 0839    [place] Urine Culture Tube - Urine, Clean Catch [[id number]] Collected: 02/23/25 0537    Specimen: Urine, Clean Catch Updated: 02/23/25 0603      Extra Tube Hold for add-ons.     Comment: Auto resulted.       Urinalysis, Microscopic Only - Urine, Clean Catch [[id number]]  (Abnormal) Collected: 02/23/25 0537    Specimen: Urine, Clean Catch Updated: 02/23/25 0602     RBC, UA None Seen /HPF      WBC, UA 6-10 /HPF      Bacteria, UA 4+ /HPF      Squamous Epithelial Cells, UA 0-2 /HPF      Hyaline Casts, UA None Seen /LPF      Methodology Manual Light Microscopy    Urinalysis With Microscopic If Indicated (No Culture) - Urine, Clean Catch [[id number]]  (Abnormal) Collected: 02/23/25 0537    Specimen: Urine, Clean Catch Updated: 02/23/25 0558     Color, UA Yellow     Appearance, UA Clear     pH, UA 8.0     Specific Gravity, UA 1.018     Glucose, UA Negative     Ketones, UA Negative     Bilirubin, UA Negative     Blood, UA Negative     Protein, UA Negative     Leuk Esterase, UA Small (1+)     Nitrite, UA Positive     Urobilinogen, UA 1.0 E.U./dL    Comprehensive Metabolic Panel [[id number]]  (Abnormal) Collected: 02/23/25 0508    Specimen: Blood Updated: 02/23/25 0534     Glucose 147 mg/dL      BUN 12 mg/dL      Creatinine 0.77 mg/dL      Sodium 137 mmol/L      Potassium 3.9 mmol/L      Chloride 102 mmol/L      CO2 25.5 mmol/L      Calcium 8.7 mg/dL      Total Protein 6.6 g/dL      Albumin 4.1 g/dL      ALT (SGPT) 12 U/L      AST (SGOT) 17 U/L      Alkaline Phosphatase 93 U/L      Total Bilirubin 0.4 mg/dL      Globulin 2.5 gm/dL      A/G Ratio 1.6 g/dL      BUN/Creatinine Ratio 15.6     Anion Gap 9.5 mmol/L      eGFR 76.6 mL/min/1.73     Narrative:      GFR Categories in Chronic Kidney Disease (CKD)      GFR Category          GFR (mL/min/1.73)    Interpretation  G1                     90 or greater         Normal or high (1)  G2                      60-89                Mild decrease (1)  G3a                   45-59                Mild to moderate decrease  G3b                   30-44                Moderate to severe decrease  G4                    15-29                 Severe decrease  G5                    14 or less           Kidney failure          (1)In the absence of evidence of kidney disease, neither GFR category G1 or G2 fulfill the criteria for CKD.    eGFR calculation 2021 CKD-EPI creatinine equation, which does not include race as a factor    Protime-INR [[id number]]  (Normal) Collected: 02/23/25 0508    Specimen: Blood Updated: 02/23/25 0522     Protime 13.0 Seconds      INR 0.97    Narrative:      Suggested INR therapeutic range for stable oral anticoagulant therapy:    Low Intensity therapy:   1.5-2.0  Moderate Intensity therapy:   2.0-3.0  High Intensity therapy:   2.5-4.0    CBC Auto Differential [[id number]]  (Abnormal) Collected: 02/23/25 0508    Specimen: Blood Updated: 02/23/25 0513     WBC 14.15 10*3/mm3      RBC 4.41 10*6/mm3      Hemoglobin 13.8 g/dL      Hematocrit 42.5 %      MCV 96.4 fL      MCH 31.3 pg      MCHC 32.5 g/dL      RDW 12.6 %      RDW-SD 45.2 fl      MPV 8.7 fL      Platelets 247 10*3/mm3      Neutrophil % 85.2 %      Lymphocyte % 8.6 %      Monocyte % 5.2 %      Eosinophil % 0.0 %      Basophil % 0.4 %      Immature Grans % 0.6 %      Neutrophils, Absolute 12.06 10*3/mm3      Lymphocytes, Absolute 1.21 10*3/mm3      Monocytes, Absolute 0.74 10*3/mm3      Eosinophils, Absolute 0.00 10*3/mm3      Basophils, Absolute 0.06 10*3/mm3      Immature Grans, Absolute 0.08 10*3/mm3      nRBC 0.0 /100 WBC     TSH [[id number]]  (Normal) Collected: 02/23/25 0155    Specimen: Blood from Arm, Left Updated: 02/23/25 0458     TSH 1.800 uIU/mL     Magnesium [[id number]]  (Normal) Collected: 02/23/25 0155    Specimen: Blood from Arm, Left Updated: 02/23/25 0445     Magnesium 2.3 mg/dL     Comprehensive Metabolic Panel [[id number]]  (Abnormal) Collected: 02/23/25 0155    Specimen: Blood from Arm, Left Updated: 02/23/25 0222     Glucose 171 mg/dL      BUN 14 mg/dL      Creatinine 0.81 mg/dL      Sodium 136 mmol/L      Potassium 3.8 mmol/L      Chloride 101 mmol/L       CO2 23.9 mmol/L      Calcium 9.1 mg/dL      Total Protein 7.0 g/dL      Albumin 4.1 g/dL      ALT (SGPT) 12 U/L      AST (SGOT) 16 U/L      Alkaline Phosphatase 100 U/L      Total Bilirubin 0.4 mg/dL      Globulin 2.9 gm/dL      A/G Ratio 1.4 g/dL      BUN/Creatinine Ratio 17.3     Anion Gap 11.1 mmol/L      eGFR 72.1 mL/min/1.73     Narrative:      GFR Categories in Chronic Kidney Disease (CKD)      GFR Category          GFR (mL/min/1.73)    Interpretation  G1                     90 or greater         Normal or high (1)  G2                      60-89                Mild decrease (1)  G3a                   45-59                Mild to moderate decrease  G3b                   30-44                Moderate to severe decrease  G4                    15-29                Severe decrease  G5                    14 or less           Kidney failure          (1)In the absence of evidence of kidney disease, neither GFR category G1 or G2 fulfill the criteria for CKD.    eGFR calculation 2021 CKD-EPI creatinine equation, which does not include race as a factor    CBC & Differential [[id number]]  (Abnormal) Collected: 02/23/25 0155    Specimen: Blood from Arm, Left Updated: 02/23/25 0201    Narrative:      The following orders were created for panel order CBC & Differential.  Procedure                               Abnormality         Status                     ---------                               -----------         ------                     CBC Auto Differential[[id number]]        Abnormal            Final result                 Please view results for these tests on the individual orders.    CBC Auto Differential [[id number]]  (Abnormal) Collected: 02/23/25 0155    Specimen: Blood from Arm, Left Updated: 02/23/25 0201     WBC 16.27 10*3/mm3      RBC 4.59 10*6/mm3      Hemoglobin 14.2 g/dL      Hematocrit 43.7 %      MCV 95.2 fL      MCH 30.9 pg      MCHC 32.5 g/dL      RDW 12.6 %      RDW-SD 44.5 fl      MPV 8.7 fL       Platelets 258 10*3/mm3      Neutrophil % 89.9 %      Lymphocyte % 5.1 %      Monocyte % 4.4 %      Eosinophil % 0.0 %      Basophil % 0.2 %      Immature Grans % 0.4 %      Neutrophils, Absolute 14.62 10*3/mm3      Lymphocytes, Absolute 0.83 10*3/mm3      Monocytes, Absolute 0.71 10*3/mm3      Eosinophils, Absolute 0.00 10*3/mm3      Basophils, Absolute 0.04 10*3/mm3      Immature Grans, Absolute 0.07 10*3/mm3      nRBC 0.0 /100 WBC     [place] Draw [[id number]] Collected: 02/23/25 0155    Specimen: Blood from Arm, Left Updated: 02/23/25 0200    Narrative:      The following orders were created for panel order [place] Draw.  Procedure                               Abnormality         Status                     ---------                               -----------         ------                     Green Top (Gel)[[id number]]                                  Final result               Lavender Top[[id number]]                                     Final result               Gold Top - SST[[id number]]                                   Final result               Light Blue Top[[id number]]                                   Final result                 Please view results for these tests on the individual orders.    Green Top (Gel) [[id number]] Collected: 02/23/25 0155    Specimen: Blood from Arm, Left Updated: 02/23/25 0200     Extra Tube Hold for add-ons.     Comment: Auto resulted.       [place] Top [[id number]] Collected: 02/23/25 0155    Specimen: Blood from Arm, Left Updated: 02/23/25 0200     Extra Tube hold for add-on     Comment: Auto resulted       Gold Top - SST [[id number]] Collected: 02/23/25 0155    Specimen: Blood from Arm, Left Updated: 02/23/25 0200     Extra Tube Hold for add-ons.     Comment: Auto resulted.       Light Blue Top [[id number]] Collected: 02/23/25 0155    Specimen: Blood from Arm, Left Updated: 02/23/25 0200     Extra Tube Hold for add-ons.     Comment: Auto resulted               Images:    Left hip  and femur x-rays reviewed.  Femoral neck fracture displaced as noted.  No other fracture.  Mild arthritic changes at the hip joint.       Assessment  Left femoral neck fracture, displaced    Plan    Due to the patient's acute nature of her injury and ambulatory status I would recommend a left hip hemiarthroplasty.    This was discussed significantly with the patient's significant other.    Risk benefits alternatives and complications was discussed with the patient prior to surgery as well as the patient's significant other.    Maintain n.p.o. status for now.  Anticipate surgery 2-.      Perry Cobb MD  02/23/25  09:07 EST

## 2025-02-23 NOTE — ED PROVIDER NOTES
Subjective   History of Present Illness  This is an 84 year old female patient who presents to the ER with chief complaint of fall.  presents with her and provides history as patient has dementia.  says he was leading her into the living room from the kitchen when she went in a direction he wasn't expecting and she fell hitting her head and her left hip. She has baseline difficulty with ambulation but now worsened since the fall. No LOC or syncope.         Review of Systems   Unable to perform ROS: Dementia       History reviewed. No pertinent past medical history.    Allergies   Allergen Reactions    Sulfa Antibiotics Hives       History reviewed. No pertinent surgical history.    History reviewed. No pertinent family history.    Social History     Socioeconomic History    Marital status:            Objective   Physical Exam  Vitals and nursing note reviewed.   Constitutional:       General: She is not in acute distress.     Appearance: She is well-developed. She is not diaphoretic.   HENT:      Head: Normocephalic.      Right Ear: External ear normal.      Left Ear: External ear normal.      Nose: Nose normal.   Eyes:      Conjunctiva/sclera: Conjunctivae normal.      Pupils: Pupils are equal, round, and reactive to light.   Neck:      Vascular: No JVD.      Trachea: No tracheal deviation.   Cardiovascular:      Rate and Rhythm: Normal rate and regular rhythm.      Heart sounds: Normal heart sounds. No murmur heard.  Pulmonary:      Effort: Pulmonary effort is normal. No respiratory distress.      Breath sounds: Normal breath sounds. No wheezing.   Abdominal:      General: Bowel sounds are normal.      Palpations: Abdomen is soft.      Tenderness: There is no abdominal tenderness.   Musculoskeletal:         General: Tenderness and signs of injury present. No deformity. Normal range of motion.      Cervical back: Normal range of motion and neck supple.   Skin:     General: Skin is warm and dry.       Coloration: Skin is not pale.      Findings: No erythema or rash.   Neurological:      Mental Status: She is alert and oriented to person, place, and time.      Cranial Nerves: No cranial nerve deficit.   Psychiatric:         Behavior: Behavior normal.         Thought Content: Thought content normal.       Results for orders placed or performed during the hospital encounter of 02/22/25   Comprehensive Metabolic Panel    Collection Time: 02/23/25  1:55 AM    Specimen: Arm, Left; Blood   Result Value Ref Range    Glucose 171 (H) 65 - 99 mg/dL    BUN 14 8 - 23 mg/dL    Creatinine 0.81 0.57 - 1.00 mg/dL    Sodium 136 136 - 145 mmol/L    Potassium 3.8 3.5 - 5.2 mmol/L    Chloride 101 98 - 107 mmol/L    CO2 23.9 22.0 - 29.0 mmol/L    Calcium 9.1 8.6 - 10.5 mg/dL    Total Protein 7.0 6.0 - 8.5 g/dL    Albumin 4.1 3.5 - 5.2 g/dL    ALT (SGPT) 12 1 - 33 U/L    AST (SGOT) 16 1 - 32 U/L    Alkaline Phosphatase 100 39 - 117 U/L    Total Bilirubin 0.4 0.0 - 1.2 mg/dL    Globulin 2.9 gm/dL    A/G Ratio 1.4 g/dL    BUN/Creatinine Ratio 17.3 7.0 - 25.0    Anion Gap 11.1 5.0 - 15.0 mmol/L    eGFR 72.1 >60.0 mL/min/1.73   CBC Auto Differential    Collection Time: 02/23/25  1:55 AM    Specimen: Arm, Left; Blood   Result Value Ref Range    WBC 16.27 (H) 3.40 - 10.80 10*3/mm3    RBC 4.59 3.77 - 5.28 10*6/mm3    Hemoglobin 14.2 12.0 - 15.9 g/dL    Hematocrit 43.7 34.0 - 46.6 %    MCV 95.2 79.0 - 97.0 fL    MCH 30.9 26.6 - 33.0 pg    MCHC 32.5 31.5 - 35.7 g/dL    RDW 12.6 12.3 - 15.4 %    RDW-SD 44.5 37.0 - 54.0 fl    MPV 8.7 6.0 - 12.0 fL    Platelets 258 140 - 450 10*3/mm3    Neutrophil % 89.9 (H) 42.7 - 76.0 %    Lymphocyte % 5.1 (L) 19.6 - 45.3 %    Monocyte % 4.4 (L) 5.0 - 12.0 %    Eosinophil % 0.0 (L) 0.3 - 6.2 %    Basophil % 0.2 0.0 - 1.5 %    Immature Grans % 0.4 0.0 - 0.5 %    Neutrophils, Absolute 14.62 (H) 1.70 - 7.00 10*3/mm3    Lymphocytes, Absolute 0.83 0.70 - 3.10 10*3/mm3    Monocytes, Absolute 0.71 0.10 - 0.90  10*3/mm3    Eosinophils, Absolute 0.00 0.00 - 0.40 10*3/mm3    Basophils, Absolute 0.04 0.00 - 0.20 10*3/mm3    Immature Grans, Absolute 0.07 (H) 0.00 - 0.05 10*3/mm3    nRBC 0.0 0.0 - 0.2 /100 WBC   Type & Screen    Collection Time: 02/23/25  1:55 AM    Specimen: Arm, Left; Blood   Result Value Ref Range    ABO Type O     RH type Positive     Antibody Screen Negative     T&S Expiration Date 2/26/2025 11:59:59 PM    Green Top (Gel)    Collection Time: 02/23/25  1:55 AM   Result Value Ref Range    Extra Tube Hold for add-ons.    Lavender Top    Collection Time: 02/23/25  1:55 AM   Result Value Ref Range    Extra Tube hold for add-on    Gold Top - SST    Collection Time: 02/23/25  1:55 AM   Result Value Ref Range    Extra Tube Hold for add-ons.    Light Blue Top    Collection Time: 02/23/25  1:55 AM   Result Value Ref Range    Extra Tube Hold for add-ons.    ABO RH Specimen Verification    Collection Time: 02/23/25  2:22 AM    Specimen: Blood   Result Value Ref Range    ABO Type O     RH type Positive    ECG 12 Lead Pre-Op / Pre-Procedure    Collection Time: 02/23/25  2:25 AM   Result Value Ref Range    QT Interval 352 ms    QTC Interval 454 ms     XR Tibia Fibula 2 View Left    Result Date: 2/23/2025   Intact left tibia and fibula with no acute fracture or dislocation. 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 Mild osteoarthritis at the left tibiotalar joint. No foreign body.    This report was finalized on 2/23/2025 12:20 AM by Eamon Omalley MD.      XR Femur 2 View Left    Result Date: 2/23/2025   Acute left femoral neck fracture. No acute dislocation. Osteoarthritis.  This report was finalized on 2/23/2025 12:19 AM by Eamon Omalley MD.      XR Hip With or Without Pelvis 2 - 3 View Left    Result Date: 2/23/2025  Impression:  Acute left femoral neck fracture. No acute dislocation of the left femoral head. Mild osteoarthritis at the right and left hip joint. Intact pelvis No acute foreign body.  This report was  finalized on 2/23/2025 12:18 AM by Eamon Omalley MD.      CT Cervical Spine Without Contrast    Result Date: 2/23/2025   No acute fracture or dislocation. No apical pneumothorax.  This report was finalized on 2/23/2025 12:16 AM by Eamon Omalley MD.      CT Head Without Contrast    Result Date: 2/23/2025  Impression:  Moderate generalized brain volume loss with mild to moderate chronic small vessel ischemic type changes in the white matter. Ex-vacuo dilatation of the ventricular system due to underlying deep white matter parenchymal volume loss No acute intracranial hemorrhage, mass, infarct, or edema. Minimal mucosal thickening in the ethmoid air cells Partial opacification of the left mastoid air cells. No fracture or dislocation. No scalp hematoma.  This report was finalized on 2/23/2025 12:15 AM by Eamon Omalley MD.         Procedures           ED Course  ED Course as of 02/23/25 0304   Sat Feb 22, 2025   2202 Signed out to Anjelica Capps NP at shift change  [MM]   Sun Feb 23, 2025   0045 Spoke to Dr. Cobb who accepts the patient as a consult. [KH]   0118 Spoke to Dr. Arteaga who accepts the patient to the hospitalist team. [KH]      ED Course User Index  [KH] Anjelica Capps, APRCASEY  [MM] Mandy Ray PA                                                       Medical Decision Making  Amount and/or Complexity of Data Reviewed  Labs: ordered.  Radiology: ordered.  ECG/medicine tests: ordered.    Risk  Prescription drug management.        Final diagnoses:   Closed fracture of neck of left femur, initial encounter       ED Disposition  ED Disposition       ED Disposition   Decision to Admit    Condition   --    Comment   Level of Care: Med/Surg [1]   Diagnosis: Hip fracture [156347]   Certification: I Certify That Inpatient Hospital Services Are Medically Necessary For Greater Than 2 Midnights                 No follow-up provider specified.       Medication List      No changes were made to your  prescriptions during this visit.            Anjelica Capps, APRN  02/23/25 0309

## 2025-02-23 NOTE — ANESTHESIA POSTPROCEDURE EVALUATION
Patient: Crista Blunt    Procedure Summary       Date: 02/23/25 Room / Location: [facility] OR 04 /  [facility] OR    Anesthesia Start: 0939 Anesthesia Stop: 1053    Procedure: Left hip lexi arthroplasty (Left: Hip) Diagnosis:       Closed fracture of left hip, initial encounter      (Closed fracture of left hip, initial encounter [S72.002A])    Surgeons: Perry Cobb MD Provider: Fer Shaver MD    Anesthesia Type: general ASA Status: 3            Anesthesia Type: general    Vitals  Vitals Value Taken Time   /65 02/23/25 1120   Temp 97.6 °F (36.4 °C) 02/23/25 1055   Pulse 99 02/23/25 1121   Resp 19 02/23/25 1115   SpO2 92 % 02/23/25 1121   Vitals shown include unfiled device data.        Post Anesthesia Care and Evaluation    Patient location during evaluation: PHASE II  Patient participation: complete - patient participated  Level of consciousness: awake and alert  Pain score: 1  Pain management: adequate    Airway patency: patent  Anesthetic complications: No anesthetic complications  PONV Status: controlled  Cardiovascular status: acceptable  Respiratory status: acceptable  Hydration status: acceptable  No anesthesia care post op

## 2025-02-24 LAB
PROCALCITONIN SERPL-MCNC: 1.31 NG/ML (ref 0–0.25)
QT INTERVAL: 394 MS
QTC INTERVAL: 471 MS

## 2025-02-24 PROCEDURE — 97535 SELF CARE MNGMENT TRAINING: CPT

## 2025-02-24 PROCEDURE — 87040 BLOOD CULTURE FOR BACTERIA: CPT | Performed by: FAMILY MEDICINE

## 2025-02-24 PROCEDURE — 97167 OT EVAL HIGH COMPLEX 60 MIN: CPT

## 2025-02-24 PROCEDURE — 97162 PT EVAL MOD COMPLEX 30 MIN: CPT

## 2025-02-24 PROCEDURE — 25010000002 ENOXAPARIN PER 10 MG: Performed by: GENERAL PRACTICE

## 2025-02-24 PROCEDURE — 25010000002 CEFTRIAXONE PER 250 MG: Performed by: FAMILY MEDICINE

## 2025-02-24 PROCEDURE — 84145 PROCALCITONIN (PCT): CPT | Performed by: FAMILY MEDICINE

## 2025-02-24 PROCEDURE — 99232 SBSQ HOSP IP/OBS MODERATE 35: CPT | Performed by: FAMILY MEDICINE

## 2025-02-24 RX ORDER — AZITHROMYCIN 250 MG/1
500 TABLET, FILM COATED ORAL DAILY
Status: COMPLETED | OUTPATIENT
Start: 2025-02-24 | End: 2025-02-24

## 2025-02-24 RX ORDER — AZITHROMYCIN 250 MG/1
250 TABLET, FILM COATED ORAL DAILY
Status: COMPLETED | OUTPATIENT
Start: 2025-02-25 | End: 2025-02-28

## 2025-02-24 RX ADMIN — CEFTRIAXONE 1000 MG: 1 INJECTION, POWDER, FOR SOLUTION INTRAMUSCULAR; INTRAVENOUS at 18:01

## 2025-02-24 RX ADMIN — ENOXAPARIN SODIUM 40 MG: 100 INJECTION SUBCUTANEOUS at 09:44

## 2025-02-24 RX ADMIN — Medication 10 ML: at 09:44

## 2025-02-24 RX ADMIN — Medication 10 ML: at 21:57

## 2025-02-24 RX ADMIN — AZITHROMYCIN DIHYDRATE 500 MG: 250 TABLET ORAL at 12:02

## 2025-02-24 RX ADMIN — ACETAMINOPHEN 650 MG: 650 SOLUTION ORAL at 10:52

## 2025-02-24 NOTE — THERAPY EVALUATION
Acute Care - Occupational Therapy Initial Evaluation   [facility]     Patient Name: Crista Blunt  : 1940  MRN: [id number]  Today's Date: 2025  Onset of Illness/Injury or Date of Surgery: 25     Referring Physician: Jordyn    Admit Date: 2025       ICD-10-CM ICD-9-CM   1. Closed fracture of neck of left femur, initial encounter  S72.002A 820.8   2. Closed fracture of left hip, initial encounter  S72.002A 820.8     Patient Active Problem List   Diagnosis    Hip fracture     History reviewed. No pertinent past medical history.  History reviewed. No pertinent surgical history.      OT ASSESSMENT FLOWSHEET (Last 12 Hours)       OT Evaluation and Treatment       Row Name 25 1447                   OT Time and Intention    Document Type evaluation  -KR        Mode of Treatment occupational therapy  -KR        Patient Effort adequate  -KR           General Information    Prior Level of Function min assist:  -KR           Living Environment    Current Living Arrangements home  -KR        People in Home spouse;other relative(s)  -KR           Cognition    Affect/Mental Status (Cognition) confused  -KR        Orientation Status (Cognition) disoriented to;place;situation;time  -KR           Range of Motion Comprehensive    Comment, General Range of Motion BUE observed WFL to assist with self care/mobility tasks  -KR           Strength Comprehensive (MMT)    Comment, General Manual Muscle Testing (MMT) Assessment WFL to assist with mobility/self care, generally decreased  -KR           Activities of Daily Living    BADL Assessment/Intervention bathing;upper body dressing;lower body dressing;grooming;toileting  -KR           Bathing Assessment/Intervention    [place] Level (Bathing) bathing skills;dependent (less than 25% patient effort)  -KR           Upper Body Dressing Assessment/Training    [place] Level (Upper Body Dressing) upper body dressing skills;dependent (less than 25% patient  effort)  -KR           Lower Body Dressing Assessment/Training    [place] Level (Lower Body Dressing) lower body dressing skills;dependent (less than 25% patient effort)  -KR           Grooming Assessment/Training    [place] Level (Grooming) grooming skills  -KR           Bed Mobility    Scooting/Bridging [place] (Bed Mobility) dependent (less than 25% patient effort)  -KR        Supine-Sit [place] (Bed Mobility) dependent (less than 25% patient effort)  -KR        Sit-Supine [place] (Bed Mobility) dependent (less than 25% patient effort)  -KR           Wound 02/23/25 0959 Left anterior thigh    Wound - Properties Group Placement Date: 02/23/25  -JG Placement Time: 0959 -JG Side: Left  -JG Orientation: anterior  -JG Location: thigh  -JG    Retired Wound - Properties Group Placement Date: 02/23/25  -JG Placement Time: 0959  -JG Side: Left  -JG Orientation: anterior  -JG Location: thigh  -JG    Retired Wound - Properties Group Placement Date: 02/23/25  -JG Placement Time: 0959  -JG Side: Left  -JG Orientation: anterior  -JG Location: thigh  -JG    Retired Wound - Properties Group Date first assessed: 02/23/25  -JG Time first assessed: 0959  -JG Side: Left  -JG Location: thigh  -JG       Plan of Care Review    Plan of Care Reviewed With patient;daughter  -KR           Therapy Assessment/Plan (OT)    Planned Therapy Interventions (OT) activity tolerance training;BADL retraining;transfer/mobility retraining  -KR           Therapy Plan Review/Discharge Plan (OT)    Anticipated Discharge Disposition (OT) extended care facility;inpatient rehabilitation facility  to enhance ability to assist caregiver in home  -KR           OT Goals    Bed Mobility Goal Selection (OT) bed mobility, OT goal 1  -KR        Activity Tolerance Goal Selection (OT) activity tolerance, OT goal 1  -KR           Bed Mobility Goal 1 (OT)    Activity/Assistive Device (Bed Mobility Goal 1, OT) bed mobility activities, all  -KR         [place] Level/Cues Needed (Bed Mobility Goal 1, OT) minimum assist (75% or more patient effort)  -KR        Time Frame (Bed Mobility Goal 1, OT) by discharge  -KR            Activity Tolerance Goal 1 (OT)    Activity Tolerance Goal 1 (OT) Increase to enhance ADL performance/mobility performance  -KR        Activity Level (Endurance Goal 1, OT) 15 min activity  -KR        Time Frame (Activity Tolerance Goal 1, OT) by discharge  -KR                  User Key  (r) = Recorded By, (t) = Taken By, (c) = Cosigned By      Initials Name Effective Dates    Kristin Avilez, SUSHILA 08/29/23 -     KR Perry Sauceda OT 06/16/21 -                      Occupational Therapy Education        No education to display                      OT Recommendation and Plan  Planned Therapy Interventions (OT): activity tolerance training, BADL retraining, transfer/mobility retraining  Plan of Care Review  Plan of Care Reviewed With: patient, daughter  Plan of Care Reviewed With: patient, daughter        Time Calculation:     Therapy Charges for Today       Code Description Service Date Service Provider Modifiers Qty    [id number]  OT EVAL HIGH COMPLEXITY 4 2/24/2025 Perry Sauceda OT GO 1    [id number]  OT SELF CARE/MGMT/TRAIN EA 15 MIN 2/24/2025 Perry Sauceda OT GO 1                 Perry Sauceda OT  2/24/2025

## 2025-02-24 NOTE — PLAN OF CARE
Goal Outcome Evaluation:  Plan of Care Reviewed With: patient        Progress: no change  Outcome Evaluation: Pt has rested in bed overnight. VSS. Pain well controlled. Surgical dressing CDI. Pt straight cathed r/t retention. Will continue POC.

## 2025-02-24 NOTE — CASE MANAGEMENT/SOCIAL WORK
Discharge Planning Assessment   [facility]     Patient Name: Crista Blunt  MRN: [id number]  Today's Date: 2/24/2025    Admit Date: 2/22/2025    Plan: SS received a consult for d/c planning. SS spoke with pt's daughter and family at bedside on this date. Pt lives with spouse and son. PCP is Abby Gallegos. Pt does not utilize home health services. Pt has a rolling walker. Pt does not utilize home health services at this time. Pt's daughter states she plans to speak with pt's spouseDonavon to discuss returning home at discharge vs SNF prior to returning home. SS attempted to contact pt's spouse Donavon on this date with no success. SS to follow and assist with discharge planning.   Discharge Needs Assessment       Row Name 02/24/25 1635       Living Environment    People in Home spouse    Current Living Arrangements home    Potentially Unsafe Housing Conditions none    Primary Care Provided by self    Provides Primary Care For no one    Family Caregiver if Needed none    Quality of Family Relationships involved;helpful;supportive    Able to Return to Prior Arrangements yes       Resource/Environmental Concerns    Resource/Environmental Concerns none    Transportation Concerns none       Transition Planning    Patient/Family Anticipates Transition to home with family    Patient/Family Anticipated Services at Transition none    Transportation Anticipated family or friend will provide       Discharge Needs Assessment    Equipment Currently Used at Home none    Anticipated Changes Related to Illness none    Equipment Needed After Discharge none             Discharge Plan       Row Name 02/24/25 1635       Plan    Plan SS received a consult for d/c planning. SS spoke with pt's daughter and family at bedside on this date. Pt lives with spouse and son. PCP is Abby Gallegos. Pt does not utilize home health services. Pt has a rolling walker. Pt does not utilize home health services at this time. Pt's daughter states she plans to  speak with pt's spouseDonavon to discuss returning home at discharge vs SNF prior to returning home. SS attempted to contact pt's spouse, Donavon on this date with no success. SS to follow and assist with discharge planning.    Patient/Family in Agreement with Plan yes          Continued Care and Services - Admitted Since 2/22/2025    No active coordination exists for this encounter.       Expected Discharge Date and Time       Expected Discharge Date Expected Discharge Time    Feb 27, 2025            Demographic Summary       Row Name 02/24/25 1635       General Information    Admission Type inpatient    Referral Source nursing    Reason for Consult discharge planning  SS received a consult for d/c planning.              YULIYA Valdivia

## 2025-02-24 NOTE — PLAN OF CARE
Goal Outcome Evaluation:   Pt resting in bed at this time. VSS. Had hip surgery this shift. Will continue plan of care.

## 2025-02-24 NOTE — PROGRESS NOTES
LOS: 1 day     Chief Complaint: Left hip fracture    Subjective     Interval History:   Postoperative day 1 left hip hemiarthroplasty secondary to femoral neck fracture.    Patient in bed eyes closed, opens eyes to stimuli.  Family member at bedside states patient was conversing with her this morning.  States mental status was at baseline with confusion/dementia.    No acute events reported overnight.      Objective     Vital Signs  Temp:  [97.3 °F (36.3 °C)-100.8 °F (38.2 °C)] 97.3 °F (36.3 °C)  Heart Rate:  [] 115  Resp:  [15-19] 18  BP: ()/(40-88) 112/63    Labs & Rads  Lab Results (last 72 hours)       Procedure Component Value Units Date/Time    Manual Differential [[id number]]  (Abnormal) Collected: 02/23/25 1536    Specimen: Blood Updated: 02/23/25 1800     Neutrophil % 86.0 %      Lymphocyte % 7.0 %      Monocyte % 5.0 %      Basophil % 1.0 %      Myelocyte % 1.0 %      Neutrophils Absolute 14.51 10*3/mm3      Lymphocytes Absolute 1.18 10*3/mm3      Monocytes Absolute 0.84 10*3/mm3      Basophils Absolute 0.17 10*3/mm3      RBC Morphology Normal     Platelet Estimate Adequate    Respiratory Panel PCR w/COVID-19(SARS-CoV-2) HUNG/LANDEN/CHEMA/PAD/COR/DINO In-House, NP Swab in UTM/VTM, 2 HR TAT - Swab, Nasopharynx [[id number]]  (Normal) Collected: 02/23/25 1644    Specimen: Swab from Nasopharynx Updated: 02/23/25 1750     ADENOVIRUS, PCR Not Detected     Coronavirus 229E Not Detected     Coronavirus HKU1 Not Detected     Coronavirus NL63 Not Detected     Coronavirus OC43 Not Detected     COVID19 Not Detected     Human Metapneumovirus Not Detected     Human Rhinovirus/Enterovirus Not Detected     Influenza A PCR Not Detected     Influenza B PCR Not Detected     Parainfluenza Virus 1 Not Detected     Parainfluenza Virus 2 Not Detected     Parainfluenza Virus 3 Not Detected     Parainfluenza Virus 4 Not Detected     RSV, PCR Not Detected     Bordetella pertussis pcr Not Detected     Bordetella  parapertussis PCR Not Detected     Chlamydophila pneumoniae PCR Not Detected     Mycoplasma pneumo by PCR Not Detected    Narrative:      In the setting of a positive respiratory panel with a viral infection PLUS a negative procalcitonin without other underlying concern for bacterial infection, consider observing off antibiotics or discontinuation of antibiotics and continue supportive care. If the respiratory panel is positive for atypical bacterial infection (Bordetella pertussis, Chlamydophila pneumoniae, or Mycoplasma pneumoniae), consider antibiotic de-escalation to target atypical bacterial infection.    Comprehensive Metabolic Panel [[id number]]  (Abnormal) Collected: 02/23/25 1536    Specimen: Blood Updated: 02/23/25 1714     Glucose 118 mg/dL      BUN 12 mg/dL      Creatinine 0.81 mg/dL      Sodium 137 mmol/L      Potassium 4.3 mmol/L      Comment: Slight hemolysis detected by analyzer. Result may be falsely elevated.        Chloride 103 mmol/L      CO2 22.9 mmol/L      Calcium 7.9 mg/dL      Total Protein 5.0 g/dL      Albumin 3.3 g/dL      ALT (SGPT) 11 U/L      AST (SGOT) 18 U/L      Alkaline Phosphatase 70 U/L      Total Bilirubin 0.4 mg/dL      Globulin 1.7 gm/dL      A/G Ratio 1.9 g/dL      BUN/Creatinine Ratio 14.8     Anion Gap 11.1 mmol/L      eGFR 72.1 mL/min/1.73     Narrative:      GFR Categories in Chronic Kidney Disease (CKD)      GFR Category          GFR (mL/min/1.73)    Interpretation  G1                     90 or greater         Normal or high (1)  G2                      60-89                Mild decrease (1)  G3a                   45-59                Mild to moderate decrease  G3b                   30-44                Moderate to severe decrease  G4                    15-29                Severe decrease  G5                    14 or less           Kidney failure          (1)In the absence of evidence of kidney disease, neither GFR category G1 or G2 fulfill the criteria for CKD.    eGFR  calculation 2021 CKD-EPI creatinine equation, which does not include race as a factor    CBC & Differential [[id number]]  (Abnormal) Collected: 02/23/25 1536    Specimen: Blood Updated: 02/23/25 1659    Narrative:      The following orders were created for panel order CBC & Differential.  Procedure                               Abnormality         Status                     ---------                               -----------         ------                     CBC Auto Differential[[id number]]        Abnormal            Final result               Scan Slide[[id number]]                                                                    Please view results for these tests on the individual orders.    CBC Auto Differential [[id number]]  (Abnormal) Collected: 02/23/25 1536    Specimen: Blood Updated: 02/23/25 1659     WBC 16.87 10*3/mm3      RBC 3.57 10*6/mm3      Hemoglobin 11.4 g/dL      Hematocrit 34.6 %      MCV 96.9 fL      MCH 31.9 pg      MCHC 32.9 g/dL      RDW 13.1 %      RDW-SD 47.0 fl      MPV 9.6 fL      Platelets 190 10*3/mm3     Blood Gas, Arterial With Co-Ox [[id number]]  (Abnormal) Collected: 02/23/25 1602    Specimen: Arterial Blood Updated: 02/23/25 1603     Site Right Radial     Samy's Test Positive     pH, Arterial 7.397 pH units      pCO2, Arterial 40.0 mm Hg      pO2, Arterial 55.8 mm Hg      Comment: 84 Value below reference range        HCO3, Arterial 24.6 mmol/L      Base Excess, Arterial -0.2 mmol/L      O2 Saturation, Arterial 91.4 %      Comment: 84 Value below reference range        Hemoglobin, Blood Gas 10.7 g/dL      Comment: 84 Value below reference range        Hematocrit, Blood Gas 32.8 %      Comment: 84 Value below reference range        Oxyhemoglobin 89.0 %      Comment: 84 Value below reference range        Methemoglobin 0.50 %      Carboxyhemoglobin 2.1 %      A-a DO2 42.1 mmHg      CO2 Content 25.8 mmol/L      Barometric Pressure for Blood Gas 728 mmHg      Modality Room Air     FIO2  21 %      Ventilator Mode NA     Collected by [id number]     Comment: Meter: [id number]     :  [id number]        pH, Temp Corrected --     pCO2, Temperature Corrected --     pO2, Temperature Corrected --    Urine Culture - Urine, Urine, Clean Catch [[id number]] Collected: 02/23/25 0537    Specimen: Urine, Clean Catch Updated: 02/23/25 0839    [place] Urine Culture Tube - Urine, Clean Catch [[id number]] Collected: 02/23/25 0537    Specimen: Urine, Clean Catch Updated: 02/23/25 0603     Extra Tube Hold for add-ons.     Comment: Auto resulted.       Urinalysis, Microscopic Only - Urine, Clean Catch [[id number]]  (Abnormal) Collected: 02/23/25 0537    Specimen: Urine, Clean Catch Updated: 02/23/25 0602     RBC, UA None Seen /HPF      WBC, UA 6-10 /HPF      Bacteria, UA 4+ /HPF      Squamous Epithelial Cells, UA 0-2 /HPF      Hyaline Casts, UA None Seen /LPF      Methodology Manual Light Microscopy    Urinalysis With Microscopic If Indicated (No Culture) - Urine, Clean Catch [[id number]]  (Abnormal) Collected: 02/23/25 0537    Specimen: Urine, Clean Catch Updated: 02/23/25 0558     Color, UA Yellow     Appearance, UA Clear     pH, UA 8.0     Specific Gravity, UA 1.018     Glucose, UA Negative     Ketones, UA Negative     Bilirubin, UA Negative     Blood, UA Negative     Protein, UA Negative     Leuk Esterase, UA Small (1+)     Nitrite, UA Positive     Urobilinogen, UA 1.0 E.U./dL    Comprehensive Metabolic Panel [[id number]]  (Abnormal) Collected: 02/23/25 0508    Specimen: Blood Updated: 02/23/25 0534     Glucose 147 mg/dL      BUN 12 mg/dL      Creatinine 0.77 mg/dL      Sodium 137 mmol/L      Potassium 3.9 mmol/L      Chloride 102 mmol/L      CO2 25.5 mmol/L      Calcium 8.7 mg/dL      Total Protein 6.6 g/dL      Albumin 4.1 g/dL      ALT (SGPT) 12 U/L      AST (SGOT) 17 U/L      Alkaline Phosphatase 93 U/L      Total Bilirubin 0.4 mg/dL      Globulin 2.5 gm/dL      A/G Ratio 1.6 g/dL      BUN/Creatinine Ratio 15.6      Anion Gap 9.5 mmol/L      eGFR 76.6 mL/min/1.73     Narrative:      GFR Categories in Chronic Kidney Disease (CKD)      GFR Category          GFR (mL/min/1.73)    Interpretation  G1                     90 or greater         Normal or high (1)  G2                      60-89                Mild decrease (1)  G3a                   45-59                Mild to moderate decrease  G3b                   30-44                Moderate to severe decrease  G4                    15-29                Severe decrease  G5                    14 or less           Kidney failure          (1)In the absence of evidence of kidney disease, neither GFR category G1 or G2 fulfill the criteria for CKD.    eGFR calculation 2021 CKD-EPI creatinine equation, which does not include race as a factor    Protime-INR [[id number]]  (Normal) Collected: 02/23/25 0508    Specimen: Blood Updated: 02/23/25 0522     Protime 13.0 Seconds      INR 0.97    Narrative:      Suggested INR therapeutic range for stable oral anticoagulant therapy:    Low Intensity therapy:   1.5-2.0  Moderate Intensity therapy:   2.0-3.0  High Intensity therapy:   2.5-4.0    CBC Auto Differential [[id number]]  (Abnormal) Collected: 02/23/25 0508    Specimen: Blood Updated: 02/23/25 0513     WBC 14.15 10*3/mm3      RBC 4.41 10*6/mm3      Hemoglobin 13.8 g/dL      Hematocrit 42.5 %      MCV 96.4 fL      MCH 31.3 pg      MCHC 32.5 g/dL      RDW 12.6 %      RDW-SD 45.2 fl      MPV 8.7 fL      Platelets 247 10*3/mm3      Neutrophil % 85.2 %      Lymphocyte % 8.6 %      Monocyte % 5.2 %      Eosinophil % 0.0 %      Basophil % 0.4 %      Immature Grans % 0.6 %      Neutrophils, Absolute 12.06 10*3/mm3      Lymphocytes, Absolute 1.21 10*3/mm3      Monocytes, Absolute 0.74 10*3/mm3      Eosinophils, Absolute 0.00 10*3/mm3      Basophils, Absolute 0.06 10*3/mm3      Immature Grans, Absolute 0.08 10*3/mm3      nRBC 0.0 /100 WBC     TSH [[id number]]  (Normal) Collected: 02/23/25 0155     Specimen: Blood from Arm, Left Updated: 02/23/25 0458     TSH 1.800 uIU/mL     Magnesium [[id number]]  (Normal) Collected: 02/23/25 0155    Specimen: Blood from Arm, Left Updated: 02/23/25 0445     Magnesium 2.3 mg/dL     Comprehensive Metabolic Panel [[id number]]  (Abnormal) Collected: 02/23/25 0155    Specimen: Blood from Arm, Left Updated: 02/23/25 0222     Glucose 171 mg/dL      BUN 14 mg/dL      Creatinine 0.81 mg/dL      Sodium 136 mmol/L      Potassium 3.8 mmol/L      Chloride 101 mmol/L      CO2 23.9 mmol/L      Calcium 9.1 mg/dL      Total Protein 7.0 g/dL      Albumin 4.1 g/dL      ALT (SGPT) 12 U/L      AST (SGOT) 16 U/L      Alkaline Phosphatase 100 U/L      Total Bilirubin 0.4 mg/dL      Globulin 2.9 gm/dL      A/G Ratio 1.4 g/dL      BUN/Creatinine Ratio 17.3     Anion Gap 11.1 mmol/L      eGFR 72.1 mL/min/1.73     Narrative:      GFR Categories in Chronic Kidney Disease (CKD)      GFR Category          GFR (mL/min/1.73)    Interpretation  G1                     90 or greater         Normal or high (1)  G2                      60-89                Mild decrease (1)  G3a                   45-59                Mild to moderate decrease  G3b                   30-44                Moderate to severe decrease  G4                    15-29                Severe decrease  G5                    14 or less           Kidney failure          (1)In the absence of evidence of kidney disease, neither GFR category G1 or G2 fulfill the criteria for CKD.    eGFR calculation 2021 CKD-EPI creatinine equation, which does not include race as a factor    CBC & Differential [[id number]]  (Abnormal) Collected: 02/23/25 0155    Specimen: Blood from Arm, Left Updated: 02/23/25 0201    Narrative:      The following orders were created for panel order CBC & Differential.  Procedure                               Abnormality         Status                     ---------                               -----------         ------                      CBC Auto Differential[[id number]]        Abnormal            Final result                 Please view results for these tests on the individual orders.    CBC Auto Differential [[id number]]  (Abnormal) Collected: 02/23/25 0155    Specimen: Blood from Arm, Left Updated: 02/23/25 0201     WBC 16.27 10*3/mm3      RBC 4.59 10*6/mm3      Hemoglobin 14.2 g/dL      Hematocrit 43.7 %      MCV 95.2 fL      MCH 30.9 pg      MCHC 32.5 g/dL      RDW 12.6 %      RDW-SD 44.5 fl      MPV 8.7 fL      Platelets 258 10*3/mm3      Neutrophil % 89.9 %      Lymphocyte % 5.1 %      Monocyte % 4.4 %      Eosinophil % 0.0 %      Basophil % 0.2 %      Immature Grans % 0.4 %      Neutrophils, Absolute 14.62 10*3/mm3      Lymphocytes, Absolute 0.83 10*3/mm3      Monocytes, Absolute 0.71 10*3/mm3      Eosinophils, Absolute 0.00 10*3/mm3      Basophils, Absolute 0.04 10*3/mm3      Immature Grans, Absolute 0.07 10*3/mm3      nRBC 0.0 /100 WBC     House Draw [[id number]] Collected: 02/23/25 0155    Specimen: Blood from Arm, Left Updated: 02/23/25 0200    Narrative:      The following orders were created for panel order House Draw.  Procedure                               Abnormality         Status                     ---------                               -----------         ------                     Green Top (Gel)[[id number]]                                  Final result               Lavender Top[[id number]]                                     Final result               Gold Top - [facility][[id number]]                                   Final result               Light Blue Top[[id number]]                                   Final result                 Please view results for these tests on the individual orders.    Green Top (Gel) [[id number]] Collected: 02/23/25 0155    Specimen: Blood from Arm, Left Updated: 02/23/25 0200     Extra Tube Hold for add-ons.     Comment: Auto resulted.       Lavender Top [[id number]] Collected: 02/23/25 0155     Specimen: Blood from Arm, Left Updated: 02/23/25 0200     Extra Tube hold for add-on     Comment: Auto resulted       Gold Top - [facility] [[id number]] Collected: 02/23/25 0155    Specimen: Blood from Arm, Left Updated: 02/23/25 0200     Extra Tube Hold for add-ons.     Comment: Auto resulted.       Light Blue Top [[id number]] Collected: 02/23/25 0155    Specimen: Blood from Arm, Left Updated: 02/23/25 0200     Extra Tube Hold for add-ons.     Comment: Auto resulted             Imaging Results (Last 72 Hours)       Procedure Component Value Units Date/Time    XR Pelvis 1 or 2 View [[id number]] Collected: 02/24/25 0829     Updated: 02/24/25 0832    Narrative:      EXAM:    XR Pelvis, 1 or 2 Views     EXAM DATE:    2/23/2025 11:03 AM     CLINICAL HISTORY:    s/p left hip lexi, in pacu; S72.002A Fracture of unspecified part of  neck of left femur, initial encounter for closed fracture; S72.002A  Fracture of unspecified part of neck of left femur, initial encounter  for closed fracture     TECHNIQUE:    Frontal view of the pelvis.     COMPARISON:    2/22/2025     FINDINGS:    BONES/JOINTS:  Left total hip arthroplasty.  Components appear well  seated.  No acute fracture.  No dislocation.    SOFT TISSUES:  Unremarkable as visualized.       Impression:        Left total hip arthroplasty. Components appear well seated.     This report was finalized on 2/24/2025 8:30 AM by Dr. Bradley Rausch MD.       XR Chest 1 View [[id number]] Collected: 02/23/25 1938     Updated: 02/1941    Narrative:      PROCEDURE: X-ray examination of the chest performed on February 23, 2025. Single view. Upright position.     HISTORY: Hypoxia.     COMPARISON: None.     FINDINGS:     Mild enlarged heart size  Coarsened bronchovascular pattern to the lungs  Mild atelectasis at the lung bases  Left lower lobe pneumonia.  No pleural effusion or pneumothorax.  Mild dextroconvex curve at the mid thoracic spine.  Azygos lobe variant in the right upper lobe  No  lytic or blastic lesion.  No free air in the upper abdomen.       Impression:         Mild enlarged heart size.  Coarsened bronchovascular pattern to the lungs  Possible left lower lobe pneumonia.  Azygos lobe variant in the right upper lobe  No fracture or dislocation.     This report was finalized on 2/23/2025 7:39 PM by Eamon Omalley MD.       XR Chest 1 View [[id number]] Collected: 02/23/25 0525     Updated: 02/23/25 0529    Narrative:      PROCEDURE: Portable chest x-ray examination performed on February 23, 2025. Single view.     HISTORY: Preoperative evaluation.     COMPARISON: None.     FINDINGS:     Normal heart size.  Left lower lobe atelectasis  Hypoinflated lungs  Coarsened bronchovascular pattern to the lungs  Azygos lobe variant.  Possible cholelithiasis.  No free air in the upper abdomen.  No pleural effusion.  No pneumothorax.       Impression:         Normal heart size  Hypoinflated lungs  Coarsened bronchovascular pattern to the lungs.  Left lower lobe atelectasis versus pneumonia.  No pleural effusion or pneumothorax  No fracture or foreign body.     This report was finalized on 2/23/2025 5:27 AM by Eamon Omalley MD.       XR Tibia Fibula 2 View Left [[id number]] Collected: 02/23/25 0019     Updated: 02/23/25 0022    Narrative:      PROCEDURE: X-ray examination of the left tibia and fibula performed on  February 22, 2025. 3 views.     HISTORY: Fall. Leg pain.     COMPARISON: None.     FINDINGS:     Intact left tibia and fibula with no acute fracture or dislocation.  No lytic or blastic lesion. No foreign body.  No tibiotalar joint effusion.  No soft tissue swelling.       Impression:         Intact left tibia and fibula with no acute fracture or dislocation.  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  Mild osteoarthritis at the left tibiotalar joint.  No foreign body.           This report was finalized on 2/23/2025 12:20 AM by Eamon Omalley MD.       XR Femur 2 View Left [[id number]] Collected:  02/23/25 0018     Updated: 02/23/25 0021    Narrative:      PROCEDURE: X-ray examination of the left femur performed on February 22, 2025. 4 films.     HISTORY: Left leg pain. Hip pain. Fall.     COMPARISON: None.     FINDINGS:     Acute left femoral neck fracture.  No acute dislocation of the left femoral head.  Mild osteoarthritis at the left hip joint  Mild osteoarthritis at the left knee.  No acute foreign body.       Impression:         Acute left femoral neck fracture.  No acute dislocation.  Osteoarthritis.     This report was finalized on 2/23/2025 12:19 AM by Eamon Omalley MD.       XR Hip With or Without Pelvis 2 - 3 View Left [[id number]] Collected: 02/23/25 0016     Updated: 02/23/25 0020    Narrative:      PROCEDURE: X-ray examination of the pelvis and left hip performed on  February 22, 2025. 3 views.     HISTORY: Fall. Pain.     COMPARISON: None.     FINDINGS:     Intact pelvis  Intact SI joints and intact pubic symphysis.  Mild osteoarthritis at the right and left hip joint.  Degenerative disc disease in the lower lumbar spine.  Acute left femoral neck fracture.   No acute dislocation of the left femoral head.       Impression:      Impression:     Acute left femoral neck fracture.   No acute dislocation of the left femoral head.  Mild osteoarthritis at the right and left hip joint.  Intact pelvis  No acute foreign body.     This report was finalized on 2/23/2025 12:18 AM by Eamon Omalley MD.       CT Cervical Spine Without Contrast [[id number]] Collected: 02/23/25 0015     Updated: 02/23/25 0018    Narrative:      PROCEDURE: CT of the cervical spine performed without IV contrast on  February 22, 2025. The examination was performed with 2 mm axial imaging  and 2 mm sagittal coronal reconstruction images. Examination was  performed according to as low as reasonably achievable dose protocol.     HISTORY: Fall. Head injury. Neck injury.     COMPARISON: None.     FINDINGS:     Anatomic alignment of the  cervical vertebral bodies with no acute  fracture or dislocation.  Normal thickness of the prevertebral soft tissues  No lytic or blastic lesion.       Impression:         No acute fracture or dislocation.   No apical pneumothorax.     This report was finalized on 2/23/2025 12:16 AM by Eamno Omalley MD.       CT Head Without Contrast [[id number]] Collected: 02/23/25 0012     Updated: 02/23/25 0017    Narrative:      PROCEDURE: CT brain performed without IV contrast on February 22, 2025.  The examination was performed with 3 mm axial imaging and 3 mm sagittal  and coronal reconstruction images. The examination was performed  according to as low as reasonably achievable dose protocol.     HISTORY: Fall. Head injury. Neck injury.     COMPARISON: None.     FINDINGS:     Moderate generalized brain volume loss  Mild to moderate chronic small vessel ischemic type changes in the white  matter.  Deep white matter parenchymal volume loss with associated ex vacuo  dilatation of the lateral ventricles and basal cisterns.  No acute intracranial hemorrhage, mass, infarct, or edema.  Mild mucosal thickening in the ethmoid air cells.  High jugular bulb noted on the right side.  Debris noted within the right and left external auditory canal, right  greater than left.  Left side mastoiditis  Minimal mucosal thickening in the ethmoid air cells  No fracture.  No scalp hematoma.       Impression:      Impression:     Moderate generalized brain volume loss with mild to moderate chronic  small vessel ischemic type changes in the white matter.  Ex-vacuo dilatation of the ventricular system due to underlying deep  white matter parenchymal volume loss  No acute intracranial hemorrhage, mass, infarct, or edema.  Minimal mucosal thickening in the ethmoid air cells  Partial opacification of the left mastoid air cells.  No fracture or dislocation.  No scalp hematoma.     This report was finalized on 2/23/2025 12:15 AM by Eamon Omalley MD.                Physical Exam:   Physical Exam  Vitals reviewed.   Constitutional:       General: She is not in acute distress.  HENT:      Head: Normocephalic.   Cardiovascular:      Pulses: Normal pulses.   Pulmonary:      Effort: Pulmonary effort is normal. No respiratory distress.   Musculoskeletal:         General: Swelling present.      Comments: Dressing CDI left hip.  Thigh edematous but supple.  No sign of hematoma.  +PP, patient unable to follow command for dorsi plantarflexion.  Abduction pillow in place.   Skin:     General: Skin is warm and dry.   Neurological:      Mental Status: Mental status is at baseline.   Psychiatric:         Mood and Affect: Mood normal.          Results Review:     I reviewed the patient's new clinical results.      Assessment & Plan     Principal Problem:    Hip fracture    Postoperative day 1 left hip hemiarthroplasty secondary to femoral neck fracture.    PT/OT: WBAT with assistive device, anterior hip precautions, no active abduction.    Wound care: Maintain dressing, may reinforce as needed for saturation.    DVT/PPx: Lovenox 40 mg subcu daily x 14 days    Make a follow-up appointment in orthopedic office in 2 weeks upon discharge.      Plan for disposition: DC planning: LUANNE hospitalist    HUBERT Grover  02/24/25  10:02 EST

## 2025-02-24 NOTE — PROGRESS NOTES
[facility]  PROGRESS NOTE     Patient Identification:  Name:  Crista Blunt  Age:  84 y.o.  Sex:  female  :  1940  MRN:  [id number]  Visit Number:  [id number]  ROOM: [address] [place] [address]     Primary Care Provider:  Abby Mckenzie DO    Length of stay in inpatient status:  1    Subjective     Chief Compliant: Status post left hip fracture with repair  Chief Complaint   Patient presents with    Fall       History of Presenting Illness: 84-year-old female with a history of advanced dementia, dyslipidemia, hypertension, GERD, depression with anxiety, osteoarthritis.  Patient had recent fall with left hip fracture and did require repair.  In the postoperative course yesterday patient was hypotensive.  At initially was was minimally responsive but after bolus patient did improve.  Decision was made yesterday to make patient DNI.  Patient was mildly hypoxemic and was started on 2 L of O2 chest x-ray showed questionable infiltrate in the left base.  Patient also being treated for UTI    Objective     Current Hospital Meds:azithromycin, 500 mg, Oral, Daily   Followed by  [START ON 2025] azithromycin, 250 mg, Oral, Daily  cefTRIAXone, 1,000 mg, Intravenous, Q24H  enoxaparin, 40 mg, Subcutaneous, Q24H  ropivacaine 0.5 % 250 mg, Morphine 10 mg, cloNIDine 80 mcg, ketorolac 30 mg, EPINEPHrine 1 mg/mL 0.5 mg in sodium chloride 0.9 % 50 mL solution, , Intra-articular, Once  sodium chloride, 10 mL, Intravenous, Q12H       ----------------------------------------------------------------------------------------------------------------------  Vital Signs:  Temp:  [97.3 °F (36.3 °C)-100.8 °F (38.2 °C)] 97.3 °F (36.3 °C)  Heart Rate:  [] 115  Resp:  [15-19] 18  BP: ()/(40-88) 112/63  SpO2:  [90 %-99 %] 93 %  on  Flow (L/min) (Oxygen Therapy):  [2] 2;   Device (Oxygen Therapy): nasal cannula  Body mass index is 22.82 kg/m².    Wt Readings from Last 3 Encounters:   25 62.2 kg (137 lb 2 oz)    04/04/24 59 kg (130 lb)     Intake & Output (last 3 days)         02/21 0701 02/22 0700 02/22 0701 02/23 0700 02/23 0701 02/24 0700 02/24 0701 02/25 0700    P.O.   120     I.V. (mL/kg)   1102.9 (17.7)     Total Intake(mL/kg)   1222.9 (19.7)     Urine (mL/kg/hr)  100 500 (0.3)     Total Output  100 500     Net  -100 +722.9             Urine Unmeasured Occurrence   1 x           Diet: Regular/House; Fluid Consistency: Thin (IDDSI 0)  ----------------------------------------------------------------------------------------------------------------------  Physical exam:  Constitutional: No acute distress.  HEENT: Normocephalic atraumatic  Neck: Supple  Cardiovascular: Regular rate and rhythm  Pulmonary/Chest: Clear to auscultation  Abdominal:  .  Positive bowel sounds soft  Musculoskeletal: Status post left hip repair, incision site dressed  Neurological: Patient is confused does respond to voice and occasionally answer some questions.  But is clearly disoriented  Skin: No rash  Peripheral vascular: No edema  Genitourinary:  ----------------------------------------------------------------------------------------------------------------------    Last echocardiogram:    ----------------------------------------------------------------------------------------------------------------------  Results from last 7 days   Lab Units 02/23/25  1536 02/23/25  0508 02/23/25  0155   WBC 10*3/mm3 16.87* 14.15* 16.27*   HEMOGLOBIN g/dL 11.4* 13.8 14.2   HEMATOCRIT % 34.6 42.5 43.7   MCV fL 96.9 96.4 95.2   MCHC g/dL 32.9 32.5 32.5   PLATELETS 10*3/mm3 190 247 258   INR   --  0.97  --      Results from last 7 days   Lab Units 02/23/25  1602   PH, ARTERIAL pH units 7.397   PO2 ART mm Hg 55.8*   PCO2, ARTERIAL mm Hg 40.0   HCO3 ART mmol/L 24.6     Results from last 7 days   Lab Units 02/23/25  1536 02/23/25  0508 02/23/25  0155   SODIUM mmol/L 137 137 136   POTASSIUM mmol/L 4.3 3.9 3.8   MAGNESIUM mg/dL  --   --  2.3   CHLORIDE mmol/L  "103 102 101   CO2 mmol/L 22.9 25.5 23.9   BUN mg/dL 12 12 14   CREATININE mg/dL 0.81 0.77 0.81   CALCIUM mg/dL 7.9* 8.7 9.1   GLUCOSE mg/dL 118* 147* 171*   ALBUMIN g/dL 3.3* 4.1 4.1   BILIRUBIN mg/dL 0.4 0.4 0.4   ALK PHOS U/L 70 93 100   AST (SGOT) U/L 18 17 16   ALT (SGPT) U/L 11 12 12   Estimated Creatinine Clearance: 51.7 mL/min (by C-G formula based on SCr of 0.81 mg/dL).  No results found for: \"AMMONIA\"              No results found for: \"HGBA1C\", \"POCGLU\"  Lab Results   Component Value Date    TSH 1.800 02/23/2025     No results found for: \"PREGTESTUR\", \"PREGSERUM\", \"HCG\", \"HCGQUANT\"  Pain Management Panel           No data to display              Brief Urine Lab Results  (Last result in the past 365 days)        Color   Clarity   Blood   Leuk Est   Nitrite   Protein   CREAT   Urine HCG        02/23/25 0537 Yellow   Clear   Negative   Small (1+)   Positive   Negative                 No results found for: \"BLOODCX\"  Results from last 7 days   Lab Units 02/23/25  0537   NITRITE UA  Positive*   WBC UA /HPF 6-10*   BACTERIA UA /HPF 4+*   SQUAM EPITHEL UA /HPF 0-2     No results found for: \"URINECX\"  No results found for: \"WOUNDCX\"  No results found for: \"STOOLCX\"        I have personally looked at the labs and they are summarized above.  ----------------------------------------------------------------------------------------------------------------------  Detailed radiology reports for the last 24 hours:    Imaging Results (Last 24 Hours)       Procedure Component Value Units Date/Time    XR Pelvis 1 or 2 View [[id number]] Collected: 02/24/25 0829     Updated: 02/24/25 0832    Narrative:      EXAM:    XR Pelvis, 1 or 2 Views     EXAM DATE:    2/23/2025 11:03 AM     CLINICAL HISTORY:    s/p left hip lexi, in pacu; S72.002A Fracture of unspecified part of  neck of left femur, initial encounter for closed fracture; S72.002A  Fracture of unspecified part of neck of left femur, initial encounter  for closed fracture   "   TECHNIQUE:    Frontal view of the pelvis.     COMPARISON:    2/22/2025     FINDINGS:    BONES/JOINTS:  Left total hip arthroplasty.  Components appear well  seated.  No acute fracture.  No dislocation.    SOFT TISSUES:  Unremarkable as visualized.       Impression:        Left total hip arthroplasty. Components appear well seated.     This report was finalized on 2/24/2025 8:30 AM by Dr. Bradley Rausch MD.       XR Chest 1 View [[id number]] Collected: 02/23/25 1938     Updated: 02/1940    Narrative:      PROCEDURE: X-ray examination of the chest performed on February 23, 2025. Single view. Upright position.     HISTORY: Hypoxia.     COMPARISON: None.     FINDINGS:     Mild enlarged heart size  Coarsened bronchovascular pattern to the lungs  Mild atelectasis at the lung bases  Left lower lobe pneumonia.  No pleural effusion or pneumothorax.  Mild dextroconvex curve at the mid thoracic spine.  Azygos lobe variant in the right upper lobe  No lytic or blastic lesion.  No free air in the upper abdomen.       Impression:         Mild enlarged heart size.  Coarsened bronchovascular pattern to the lungs  Possible left lower lobe pneumonia.  Azygos lobe variant in the right upper lobe  No fracture or dislocation.     This report was finalized on 2/23/2025 7:39 PM by Eamon Omalley MD.             Final impressions for the last 30 days of radiology reports:    XR Pelvis 1 or 2 View    Result Date: 2/24/2025    Left total hip arthroplasty. Components appear well seated.  This report was finalized on 2/24/2025 8:30 AM by Dr. Bradley Rausch MD.      XR Chest 1 View    Result Date: 2/23/2025   Mild enlarged heart size. Coarsened bronchovascular pattern to the lungs Possible left lower lobe pneumonia. Azygos lobe variant in the right upper lobe No fracture or dislocation.  This report was finalized on 2/23/2025 7:39 PM by Eamon Omalley MD.      XR Chest 1 View    Result Date: 2/23/2025   Normal heart size Hypoinflated lungs  Coarsened bronchovascular pattern to the lungs. Left lower lobe atelectasis versus pneumonia. No pleural effusion or pneumothorax No fracture or foreign body.  This report was finalized on 2/23/2025 5:27 AM by Eamon Omalley MD.      XR Tibia Fibula 2 View Left    Result Date: 2/23/2025   Intact left tibia and fibula with no acute fracture or dislocation. 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 Mild osteoarthritis at the left tibiotalar joint. No foreign body.    This report was finalized on 2/23/2025 12:20 AM by Eamon Omalley MD.      XR Femur 2 View Left    Result Date: 2/23/2025   Acute left femoral neck fracture. No acute dislocation. Osteoarthritis.  This report was finalized on 2/23/2025 12:19 AM by Eamon Omalley MD.      XR Hip With or Without Pelvis 2 - 3 View Left    Result Date: 2/23/2025  Impression:  Acute left femoral neck fracture. No acute dislocation of the left femoral head. Mild osteoarthritis at the right and left hip joint. Intact pelvis No acute foreign body.  This report was finalized on 2/23/2025 12:18 AM by Eamon Omalley MD.      CT Cervical Spine Without Contrast    Result Date: 2/23/2025   No acute fracture or dislocation. No apical pneumothorax.  This report was finalized on 2/23/2025 12:16 AM by Eamon Omalley MD.      CT Head Without Contrast    Result Date: 2/23/2025  Impression:  Moderate generalized brain volume loss with mild to moderate chronic small vessel ischemic type changes in the white matter. Ex-vacuo dilatation of the ventricular system due to underlying deep white matter parenchymal volume loss No acute intracranial hemorrhage, mass, infarct, or edema. Minimal mucosal thickening in the ethmoid air cells Partial opacification of the left mastoid air cells. No fracture or dislocation. No scalp hematoma.  This report was finalized on 2/23/2025 12:15 AM by Eamon Omalley MD.     I have personally looked at the radiology images and read the final radiology report.    Assessment  & Plan    Status post left hip fracture requiring ORIF--patient appears to be stable at this time.  Will continue to monitor closely.  Begin PT and OT    Questionable left lower lobe pneumonia/UTI patient was started empirically on Rocephin yesterday.  Will continue Rocephin at this time and add Zithromax.    Severe Alzheimer's type dementia supportive care    Hypertension controlled    GERD continue PPI    CODE STATUS was discussed yesterday and patient is DNI but at this time  is opted for chest compressions if need arises.    VTE Prophylaxis:   Lovenox    The patient is high risk due to the following diagnoses/reasons: Advanced age, UTI/pneumonia    Deandre Hernandez MD  [facility] Hospitalist  02/24/25  10:04 EST

## 2025-02-24 NOTE — THERAPY EVALUATION
Acute Care - Physical Therapy Initial Evaluation   [facility]     Patient Name: Crista Blunt  : 1940  MRN: [id number]  Today's Date: 2025   Onset of Illness/Injury or Date of Surgery: 25  Visit Dx:     ICD-10-CM ICD-9-CM   1. Closed fracture of neck of left femur, initial encounter  S72.002A 820.8   2. Closed fracture of left hip, initial encounter  S72.002A 820.8     Patient Active Problem List   Diagnosis    Hip fracture     History reviewed. No pertinent past medical history.  History reviewed. No pertinent surgical history.  PT Assessment (Last 12 Hours)       PT Evaluation and Treatment       Row Name 25 1402          Physical Therapy Time and Intention    Document Type evaluation  -     Mode of Treatment physical therapy  -     Patient Effort adequate  -       Row Name 25 140          General Information    Patient Profile Reviewed yes  -     Onset of Illness/Injury or Date of Surgery 25  -     Referring Physician Jordyn  -     Patient Observations alert;cooperative;agree to therapy  -     Patient/Family/Caregiver Comments/Observations Family present in room  -     Prior Level of Function min assist:;all household mobility;gait;transfer;bed mobility  -     Existing Precautions/Restrictions fall;hip, anterior;other (see comments)  no active hip abduction, wedge was being worn in bed upon therapists arrival  -     Limitations/Impairments safety/cognitive  -       Row Name 25 1401          Previous Level of Function/Home Environm    Bed Mobility, Previous Functional Level independent  -     Transfers, Previous Functional Level independent;partial assistance  -     Household Ambulation, Previous Functional Level independent;partial assistance  -     Previous Level of Function Per family patient required hand held assist most of the time for ambulation  -       Row Name 25 1409          Living Environment    Current Living Arrangements  home  -KP       Row Name 02/24/25 1405          Pain    Additional Documentation Pain Scale: FACES Pre/Post-Treatment (Group)  -KP       Row Name 02/24/25 1405          Pain Scale: FACES Pre/Post-Treatment    Pain: FACES Scale, Pretreatment 2-->hurts little bit  -     Posttreatment Pain Rating 6-->hurts even more  -     Pre/Posttreatment Pain Comment left hip pain  -KP       Row Name 02/24/25 1405          Cognition    Affect/Mental Status (Cognition) confused  -     Orientation Status (Cognition) disoriented to;person;place;situation;time  -     Personal Safety Interventions fall prevention program maintained;gait belt;muscle strengthening facilitated;nonskid shoes/slippers when out of bed;supervised activity  -KP       Row Name 02/24/25 1405          Range of Motion Comprehensive    Comment, General Range of Motion Decreased tolerance to ROM to left LE due to pain.  Was able to achieve appropriate ROM to sit edge of bed.  Adhering to anterior hip precautions  -KP       Row Name 02/24/25 1405          Mobility    Extremity Weight-bearing Status left lower extremity  -     Left Lower Extremity (Weight-bearing Status) weight-bearing as tolerated (WBAT)  -KP       Row Name 02/24/25 1405          Bed Mobility    Bed Mobility supine-sit;sit-supine;scooting/bridging  -     Scooting/Bridging [place] (Bed Mobility) dependent (less than 25% patient effort);2 person assist  -     Supine-Sit [place] (Bed Mobility) dependent (less than 25% patient effort);2 person assist  -     Sit-Supine [place] (Bed Mobility) dependent (less than 25% patient effort);2 person assist  -     Bed Mobility, Safety Issues decreased use of arms for pushing/pulling;decreased use of legs for bridging/pushing;impaired trunk control for bed mobility;cognitive deficits limit understanding  -[facility] Name 02/24/25 1405          Transfers    Transfers sit-stand transfer;stand-sit transfer  -KP       Row Name 02/24/25  1405          Sit-Stand Transfer    Sit-Stand [place] (Transfers) dependent (less than 25% patient effort);2 person assist;nonverbal cues (demo/gesture);verbal cues  -     Assistive Device (Sit-Stand Transfers) walker, front-wheeled  -KP       Row Name 02/24/25 1405          Stand-Sit Transfer    Stand-Sit [place] (Transfers) dependent (less than 25% patient effort);2 person assist;verbal cues;nonverbal cues (demo/gesture)  -     Assistive Device (Stand-Sit Transfers) walker, front-wheeled  -KP       Row Name 02/24/25 1405          Gait/Stairs (Locomotion)    Patient was able to Ambulate no, other medical factors prevent ambulation  -     Reason Patient was unable to Ambulate Excessive Weakness;Uncontrolled Pain  -       Row Name 02/24/25 1405          Balance    Balance Assessment sitting static balance;sitting dynamic balance  -     Static Sitting Balance contact guard  -     Dynamic Sitting Balance contact guard;minimal assist  -     Position, Sitting Balance sitting edge of bed  -       Row Name             Wound 02/23/25 0959 Left anterior thigh    Wound - Properties Group Placement Date: 02/23/25  -JG Placement Time: 0959  -JG Side: Left  -JG Orientation: anterior  -JG Location: thigh  -JG    Retired Wound - Properties Group Placement Date: 02/23/25  -JG Placement Time: 0959  -JG Side: Left  -JG Orientation: anterior  -JG Location: thigh  -JG    Retired Wound - Properties Group Placement Date: 02/23/25  -JG Placement Time: 0959  -JG Side: Left  -JG Orientation: anterior  -JG Location: thigh  -JG    Retired Wound - Properties Group Date first assessed: 02/23/25  -JG Time first assessed: 0959  -JG Side: Left  -JG Location: thigh  -JG      Row Name 02/24/25 1405          Plan of Care Review    Plan of Care Reviewed With patient  -KP     Outcome Evaluation PT evaluation completed.  Patient required maximum to dependent assist x2 for attempting to stand but unable to fully stand upright  today.  Continue PT POC.  -       Row Name 02/24/25 1407          Positioning and Restraints    Pre-Treatment Position in bed  -     Post Treatment Position bed  -KP     In Bed notified nsg;supine;call light within reach;encouraged to call for assist;exit alarm on;with family/caregiver;side rails up x3;ABD pillow  Lines in tact and needs in reach  -KP       Row Name 02/24/25 1407          Therapy Assessment/Plan (PT)    Functional Level at Time of Evaluation (PT) maximum to dependent assist x2  -KP     PT Diagnosis (PT) left hip repair  -     Rehab Potential (PT) fair  -     Criteria for Skilled Interventions Met (PT) yes;meets criteria;skilled treatment is necessary  -     Therapy Frequency (PT) other (see comments)  5-6x/wk  -     Problem List (PT) problems related to;balance;mobility;strength  -     Activity Limitations Related to Problem List (PT) unable to transfer safely;unable to ambulate safely  -       Row Name 02/24/25 1408          PT Evaluation Complexity    History, PT Evaluation Complexity 3 or more personal factors and/or comorbidities  -     Examination of Body Systems (PT Eval Complexity) total of 3 or more elements  -     Clinical Presentation (PT Evaluation Complexity) evolving  -     Clinical Decision Making (PT Evaluation Complexity) moderate complexity  -     Overall Complexity (PT Evaluation Complexity) moderate complexity  -       Row Name 02/24/25 4233          Physical Therapy Goals    Bed Mobility Goal Selection (PT) bed mobility, PT goal 1  -     Transfer Goal Selection (PT) transfer, PT goal 1  -     Gait Training Goal Selection (PT) gait training, PT goal 1  -       Row Name 02/24/25 1403          Bed Mobility Goal 1 (PT)    Activity/Assistive Device (Bed Mobility Goal 1, PT) sit to supine/supine to sit  -     [place] Level/Cues Needed (Bed Mobility Goal 1, PT) moderate assist (50-74% patient effort)  -     Time Frame (Bed Mobility Goal 1, PT)  long term goal (LTG);by discharge  -       Row Name 02/24/25 1405          Transfer Goal 1 (PT)    Activity/Assistive Device (Transfer Goal 1, PT) transfers, all;sit-to-stand/stand-to-sit;walker, rolling  -KP     [place] Level/Cues Needed (Transfer Goal 1, PT) moderate assist (50-74% patient effort)  -KP     Time Frame (Transfer Goal 1, PT) long term goal (LTG);by discharge  -       Row Name 02/24/25 1405          Gait Training Goal 1 (PT)    Activity/Assistive Device (Gait Training Goal 1, PT) gait (walking locomotion);assistive device use;walker, rolling  -KP     [place] Level (Gait Training Goal 1, PT) moderate assist (50-74% patient effort)  -     Distance (Gait Training Goal 1, PT) 10  -KP     Time Frame (Gait Training Goal 1, PT) long term goal (LTG);by discharge  -               User Key  (r) = Recorded By, (t) = Taken By, (c) = Cosigned By      Initials Name Provider Type    Kristin Avilez RN Registered Nurse    Jenniffer Dorsey PT Physical Therapist                    Physical Therapy Education        No education to display                  PT Recommendation and Plan  Planned Therapy Interventions (PT): balance training, bed mobility training, gait training, home exercise program, neuromuscular re-education, patient/family education, postural re-education, strengthening, stretching, transfer training  Therapy Frequency (PT): other (see comments) (5-6x/wk)  Plan of Care Reviewed With: patient  Outcome Evaluation: PT evaluation completed.  Patient required maximum to dependent assist x2 for attempting to stand but unable to fully stand upright today.  Continue PT POC.       Time Calculation:    PT Charges       Row Name 02/24/25 1430             Time Calculation    PT Received On 02/24/25  -      PT Goal Re-Cert Due Date 03/10/25  -                User Key  (r) = Recorded By, (t) = Taken By, (c) = Cosigned By      Initials Name Provider Type    Jenniffer Dorsey PT Physical  Therapist                  Therapy Charges for Today       Code Description Service Date Service Provider Modifiers Qty    17143844868 HC PT EVAL MOD COMPLEXITY 4 2/24/2025 Jenniffer Palm, PT GP 1            PT [place]  AM-PAC 6 Clicks Score (PT): 8    Jenniffer Palm, PT  2/24/2025

## 2025-02-25 LAB
ANION GAP SERPL CALCULATED.3IONS-SCNC: 7.8 MMOL/L (ref 5–15)
BACTERIA SPEC AEROBE CULT: ABNORMAL
BASOPHILS # BLD AUTO: 0.05 10*3/MM3 (ref 0–0.2)
BASOPHILS NFR BLD AUTO: 0.4 % (ref 0–1.5)
BUN SERPL-MCNC: 12 MG/DL (ref 8–23)
BUN/CREAT SERPL: 15.4 (ref 7–25)
CA-I SERPL ISE-MCNC: 1.08 MMOL/L (ref 1.15–1.3)
CALCIUM SPEC-SCNC: 8 MG/DL (ref 8.6–10.5)
CHLORIDE SERPL-SCNC: 105 MMOL/L (ref 98–107)
CO2 SERPL-SCNC: 23.2 MMOL/L (ref 22–29)
CREAT SERPL-MCNC: 0.78 MG/DL (ref 0.57–1)
DEPRECATED RDW RBC AUTO: 46 FL (ref 37–54)
EGFRCR SERPLBLD CKD-EPI 2021: 75.5 ML/MIN/1.73
EOSINOPHIL # BLD AUTO: 0.14 10*3/MM3 (ref 0–0.4)
EOSINOPHIL NFR BLD AUTO: 1.1 % (ref 0.3–6.2)
ERYTHROCYTE [DISTWIDTH] IN BLOOD BY AUTOMATED COUNT: 12.9 % (ref 12.3–15.4)
GLUCOSE SERPL-MCNC: 133 MG/DL (ref 65–99)
HCT VFR BLD AUTO: 33.4 % (ref 34–46.6)
HGB BLD-MCNC: 10.7 G/DL (ref 12–15.9)
IMM GRANULOCYTES # BLD AUTO: 0.06 10*3/MM3 (ref 0–0.05)
IMM GRANULOCYTES NFR BLD AUTO: 0.5 % (ref 0–0.5)
LYMPHOCYTES # BLD AUTO: 1.32 10*3/MM3 (ref 0.7–3.1)
LYMPHOCYTES NFR BLD AUTO: 10.4 % (ref 19.6–45.3)
MCH RBC QN AUTO: 31.4 PG (ref 26.6–33)
MCHC RBC AUTO-ENTMCNC: 32 G/DL (ref 31.5–35.7)
MCV RBC AUTO: 97.9 FL (ref 79–97)
MONOCYTES # BLD AUTO: 0.74 10*3/MM3 (ref 0.1–0.9)
MONOCYTES NFR BLD AUTO: 5.8 % (ref 5–12)
NEUTROPHILS NFR BLD AUTO: 10.38 10*3/MM3 (ref 1.7–7)
NEUTROPHILS NFR BLD AUTO: 81.8 % (ref 42.7–76)
NRBC BLD AUTO-RTO: 0 /100 WBC (ref 0–0.2)
PLATELET # BLD AUTO: 203 10*3/MM3 (ref 140–450)
PMV BLD AUTO: 9 FL (ref 6–12)
POTASSIUM SERPL-SCNC: 3.7 MMOL/L (ref 3.5–5.2)
RBC # BLD AUTO: 3.41 10*6/MM3 (ref 3.77–5.28)
SODIUM SERPL-SCNC: 136 MMOL/L (ref 136–145)
WBC NRBC COR # BLD AUTO: 12.69 10*3/MM3 (ref 3.4–10.8)

## 2025-02-25 PROCEDURE — 25010000002 CEFTRIAXONE PER 250 MG: Performed by: FAMILY MEDICINE

## 2025-02-25 PROCEDURE — 80048 BASIC METABOLIC PNL TOTAL CA: CPT | Performed by: FAMILY MEDICINE

## 2025-02-25 PROCEDURE — 97530 THERAPEUTIC ACTIVITIES: CPT

## 2025-02-25 PROCEDURE — 25010000002 ENOXAPARIN PER 10 MG: Performed by: GENERAL PRACTICE

## 2025-02-25 PROCEDURE — 97110 THERAPEUTIC EXERCISES: CPT

## 2025-02-25 PROCEDURE — 99232 SBSQ HOSP IP/OBS MODERATE 35: CPT | Performed by: FAMILY MEDICINE

## 2025-02-25 PROCEDURE — 85025 COMPLETE CBC W/AUTO DIFF WBC: CPT | Performed by: FAMILY MEDICINE

## 2025-02-25 PROCEDURE — 82330 ASSAY OF CALCIUM: CPT | Performed by: FAMILY MEDICINE

## 2025-02-25 RX ADMIN — ACETAMINOPHEN 650 MG: 650 SOLUTION ORAL at 03:51

## 2025-02-25 RX ADMIN — ENOXAPARIN SODIUM 40 MG: 100 INJECTION SUBCUTANEOUS at 08:25

## 2025-02-25 RX ADMIN — Medication 10 ML: at 08:25

## 2025-02-25 RX ADMIN — CEFTRIAXONE 1000 MG: 1 INJECTION, POWDER, FOR SOLUTION INTRAMUSCULAR; INTRAVENOUS at 18:03

## 2025-02-25 RX ADMIN — AZITHROMYCIN DIHYDRATE 250 MG: 250 TABLET ORAL at 08:25

## 2025-02-25 NOTE — THERAPY TREATMENT NOTE
Acute Care - Physical Therapy Treatment Note   [place]     Patient Name: Crista Blunt  : 1940  MRN: [id number]  Today's Date: 2025   Onset of Illness/Injury or Date of Surgery: 25  Visit Dx:     ICD-10-CM ICD-9-CM   1. Closed fracture of neck of left femur, initial encounter  S72.002A 820.8   2. Closed fracture of left hip, initial encounter  S72.002A 820.8     Patient Active Problem List   Diagnosis    Hip fracture     History reviewed. No pertinent past medical history.  History reviewed. No pertinent surgical history.  PT Assessment (Last 12 Hours)       PT Evaluation and Treatment       Row Name 25 1435          Physical Therapy Time and Intention    Document Type therapy note (daily note)  -     Mode of Treatment physical therapy  -     Patient Effort adequate  -     Comment Pt seen with , Donavon, at bedside, attentive and helpful with tx. Pt assisted to sit EOB and stand grossly x1-2 at max/depA. Limited 2/2 cognition. Attempted LE TE with some intentional repetitions with cues. Concerns/questions from  addressed, discussed husb to reach out to RN PRN.  -       Row Name 25 143          Bed Mobility    Bed Mobility supine-sit;sit-supine  -     Supine-Sit [place] (Bed Mobility) 2 person assist;maximum assist (25% patient effort);dependent (less than 25% patient effort)  -     Sit-Supine [place] (Bed Mobility) dependent (less than 25% patient effort);2 person assist  -       Row Name 25 143          Transfers    Transfers sit-stand transfer;stand-sit transfer  -       Row Name 25          Sit-Stand Transfer    Sit-Stand [place] (Transfers) dependent (less than 25% patient effort);2 person assist;1 person assist  -       Row Name 25          Stand-Sit Transfer    Stand-Sit [place] (Transfers) dependent (less than 25% patient effort);2 person assist;1 person assist  -[facility] Name 25           Gait/Stairs (Locomotion)    Patient was able to Ambulate no, other medical factors prevent ambulation  -     Reason Patient was unable to Ambulate Other (Comment)  unsafe at this time; pain and confusion limiting  -       Row Name 02/25/25 1435          Balance    Comment, Balance Sitting EOB pt had partial posterior lean, encouraged to lean forward in preparation for standing  -       Row Name 02/25/25 1435          Motor Skills    Therapeutic Exercise knee;other (see comments);ankle  encouraged SAQ with partial quad activation, grossly x5 reps; encouraged hip add without significant contraction appreciated. DF/PF encouraged with partial contraction  -       Row Name             Wound 02/23/25 0959 Left anterior thigh    Wound - Properties Group Placement Date: 02/23/25  -JG Placement Time: 0959 -JG Side: Left  -JG Orientation: anterior  -JG Location: thigh  -JG    Retired Wound - Properties Group Placement Date: 02/23/25  -JG Placement Time: 0959  -JG Side: Left  -JG Orientation: anterior  -JG Location: thigh  -JG    Retired Wound - Properties Group Placement Date: 02/23/25  -JG Placement Time: 0959  -JG Side: Left  -JG Orientation: anterior  -JG Location: thigh  -JG    Retired Wound - Properties Group Date first assessed: 02/23/25  -JG Time first assessed: 0959 -JG Side: Left  -JG Location: thigh  -JG      Row Name 02/25/25 1435          Positioning and Restraints    Pre-Treatment Position in bed  -     Post Treatment Position bed  -     In Bed supine;exit alarm on;with family/caregiver  -       Row Name 02/25/25 1435          Progress Summary (PT)    Daily Progress Summary (PT) Pt seen for treatment this date, participation was good, confusion limits pt participation; grossly max/depA at this time, no change to POC. Prognosis guarded to fair.  -               User Key  (r) = Recorded By, (t) = Taken By, (c) = Cosigned By      Initials Name Provider Type    Kristin Avilez, SUSHILA Registered  Nurse    Barbara Galarza, PT Physical Therapist                    Physical Therapy Education        No education to display                  PT Recommendation and Plan     Progress Summary (PT)  Daily Progress Summary (PT): Pt seen for treatment this date, participation was good, confusion limits pt participation; grossly max/depA at this time, no change to POC. Prognosis guarded to fair.       Time Calculation:    PT Charges       Row Name 02/25/25 1656             Time Calculation    Start Time 1435  -      PT Received On 02/25/25  -         Time Calculation- PT    Total Timed Code Minutes- PT 23 minute(s)  -                User Key  (r) = Recorded By, (t) = Taken By, (c) = Cosigned By      Initials Name Provider Type    Barbara Galarza, PT Physical Therapist                  Therapy Charges for Today       Code Description Service Date Service Provider Modifiers Qty    82589350310 HC PT THER PROC EA 15 MIN 2/25/2025 Barbara De La Cruz, PT GP 1    99387677840 HC PT THERAPEUTIC ACT EA 15 MIN 2/25/2025 Barbara De La Cruz, PT GP 1            PT G-Codes  AM-PAC 6 Clicks Score (PT): 8    Barbara De La Cruz PT  2/25/2025

## 2025-02-25 NOTE — PLAN OF CARE
Goal Outcome Evaluation:                 Pt resting in bed, VSS on room air. Pt was unable to ambulate this shift due to confusion and unable to follow directions. Pt had a temp this shift, PRN medication given per mar and md was notified. Pt still confused this shift. Pt voices no concerns or complaints at this time, will continue with POC.

## 2025-02-25 NOTE — PLAN OF CARE
Goal Outcome Evaluation:           Progress: no change  Outcome Evaluation: Pt has been resting in bed,  at bedside. Pt alert but confused to to place, time, situation. No complaints of pain from pt tonight. No acute changes or concerns at this time.

## 2025-02-25 NOTE — PROGRESS NOTES
[facility]  PROGRESS NOTE     Patient Identification:  Name:  Crista Blunt  Age:  84 y.o.  Sex:  female  :  1940  MRN:  [id number]  Visit Number:  [id number]  ROOM: [address] [place] [address]     Primary Care Provider:  Abby Mckenzie DO    Length of stay in inpatient status:  2    Subjective     Chief Compliant: Status post left hip fracture with repair  Chief Complaint   Patient presents with    Fall       History of Presenting Illness: 84-year-old female with advanced dementia who is status post fall with left hip fracture that did require repair.  Patient has been febrile although patient had a low-grade temperature early this morning.  Patient is being treated for UTI at this time.    Objective     Current Hospital Meds:azithromycin, 250 mg, Oral, Daily  cefTRIAXone, 1,000 mg, Intravenous, Q24H  enoxaparin, 40 mg, Subcutaneous, Q24H  ropivacaine 0.5 % 250 mg, Morphine 10 mg, cloNIDine 80 mcg, ketorolac 30 mg, EPINEPHrine 1 mg/mL 0.5 mg in sodium chloride 0.9 % 50 mL solution, , Intra-articular, Once  sodium chloride, 10 mL, Intravenous, Q12H       ----------------------------------------------------------------------------------------------------------------------  Vital Signs:  Temp:  [97.8 °F (36.6 °C)-100.3 °F (37.9 °C)] 97.8 °F (36.6 °C)  Resp:  [18-20] 18  BP: (109-126)/(55-62) 109/55     on   ;   Device (Oxygen Therapy): room air  Body mass index is 22.82 kg/m².    Wt Readings from Last 3 Encounters:   25 62.2 kg (137 lb 2 oz)   24 59 kg (130 lb)     Intake & Output (last 3 days)          07 07 07    P.O.  120  0    I.V. (mL/kg)  1102.9 (17.7)      Total Intake(mL/kg)  1222.9 (19.7)  0 (0)    Urine (mL/kg/hr) 100 500 (0.3) 350 (0.2) 1200 (2.6)    Total Output 100     Net -100 +722.9 -350 -1200            Urine Unmeasured Occurrence  1 x            Diet: Regular/House; Fluid  Consistency: Thin (IDDSI 0)  ----------------------------------------------------------------------------------------------------------------------  Physical exam:  Constitutional: No acute distress  HEENT: Cephalic atraumatic  Neck:   Supple  Cardiovascular: Regular rate and rhythm  Pulmonary/Chest: Clear  Abdominal: Positive bowel sounds soft.   Musculoskeletal: Status post hip repair  Neurological: Patient is disoriented  Skin: No rash  Peripheral vascular: No edema  Genitourinary:  ----------------------------------------------------------------------------------------------------------------------    Last echocardiogram:    ----------------------------------------------------------------------------------------------------------------------  Results from last 7 days   Lab Units 02/25/25  0239 02/23/25  1536 02/23/25  0508   WBC 10*3/mm3 12.69* 16.87* 14.15*   HEMOGLOBIN g/dL 10.7* 11.4* 13.8   HEMATOCRIT % 33.4* 34.6 42.5   MCV fL 97.9* 96.9 96.4   MCHC g/dL 32.0 32.9 32.5   PLATELETS 10*3/mm3 203 190 247   INR   --   --  0.97     Results from last 7 days   Lab Units 02/23/25  1602   PH, ARTERIAL pH units 7.397   PO2 ART mm Hg 55.8*   PCO2, ARTERIAL mm Hg 40.0   HCO3 ART mmol/L 24.6     Results from last 7 days   Lab Units 02/25/25  0752 02/25/25  0239 02/23/25  1536 02/23/25  0508 02/23/25  0155   SODIUM mmol/L  --  136 137 137 136   POTASSIUM mmol/L  --  3.7 4.3 3.9 3.8   MAGNESIUM mg/dL  --   --   --   --  2.3   CHLORIDE mmol/L  --  105 103 102 101   CO2 mmol/L  --  23.2 22.9 25.5 23.9   BUN mg/dL  --  12 12 12 14   CREATININE mg/dL  --  0.78 0.81 0.77 0.81   CALCIUM mg/dL  --  8.0* 7.9* 8.7 9.1   IONIZED CALCIUM mmol/L 1.08*  --   --   --   --    GLUCOSE mg/dL  --  133* 118* 147* 171*   ALBUMIN g/dL  --   --  3.3* 4.1 4.1   BILIRUBIN mg/dL  --   --  0.4 0.4 0.4   ALK PHOS U/L  --   --  70 93 100   AST (SGOT) U/L  --   --  18 17 16   ALT (SGPT) U/L  --   --  11 12 12   Estimated Creatinine Clearance: 53.7  "mL/min (by C-G formula based on SCr of 0.78 mg/dL).  No results found for: \"AMMONIA\"              No results found for: \"HGBA1C\", \"POCGLU\"  Lab Results   Component Value Date    TSH 1.800 02/23/2025     No results found for: \"PREGTESTUR\", \"PREGSERUM\", \"HCG\", \"HCGQUANT\"  Pain Management Panel           No data to display              Brief Urine Lab Results  (Last result in the past 365 days)        Color   Clarity   Blood   Leuk Est   Nitrite   Protein   CREAT   Urine HCG        02/23/25 0537 Yellow   Clear   Negative   Small (1+)   Positive   Negative                 Blood Culture   Date Value Ref Range Status   02/24/2025 No growth at 24 hours  Preliminary     Results from last 7 days   Lab Units 02/23/25  0537   NITRITE UA  Positive*   WBC UA /HPF 6-10*   BACTERIA UA /HPF 4+*   SQUAM EPITHEL UA /HPF 0-2   URINECX  >100,000 CFU/mL Escherichia coli*     Urine Culture   Date Value Ref Range Status   02/23/2025 >100,000 CFU/mL Escherichia coli (A)  Final     No results found for: \"WOUNDCX\"  No results found for: \"STOOLCX\"  Results from last 7 days   Lab Units 02/24/25  1004   PROCALCITONIN ng/mL 1.31*       I have personally looked at the labs and they are summarized above.  ----------------------------------------------------------------------------------------------------------------------  Detailed radiology reports for the last 24 hours:    Imaging Results (Last 24 Hours)       ** No results found for the last 24 hours. **          Final impressions for the last 30 days of radiology reports:    XR Pelvis 1 or 2 View    Result Date: 2/24/2025    Left total hip arthroplasty. Components appear well seated.  This report was finalized on 2/24/2025 8:30 AM by Dr. rBadley Rausch MD.      XR Chest 1 View    Result Date: 2/23/2025   Mild enlarged heart size. Coarsened bronchovascular pattern to the lungs Possible left lower lobe pneumonia. Azygos lobe variant in the right upper lobe No fracture or dislocation.  This report " was finalized on 2/23/2025 7:39 PM by Eamon Omalley MD.      XR Chest 1 View    Result Date: 2/23/2025   Normal heart size Hypoinflated lungs Coarsened bronchovascular pattern to the lungs. Left lower lobe atelectasis versus pneumonia. No pleural effusion or pneumothorax No fracture or foreign body.  This report was finalized on 2/23/2025 5:27 AM by Eamon Omalley MD.      XR Tibia Fibula 2 View Left    Result Date: 2/23/2025   Intact left tibia and fibula with no acute fracture or dislocation. 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 Mild osteoarthritis at the left tibiotalar joint. No foreign body.    This report was finalized on 2/23/2025 12:20 AM by Eamon Omalley MD.      XR Femur 2 View Left    Result Date: 2/23/2025   Acute left femoral neck fracture. No acute dislocation. Osteoarthritis.  This report was finalized on 2/23/2025 12:19 AM by Eamon Omalley MD.      XR Hip With or Without Pelvis 2 - 3 View Left    Result Date: 2/23/2025  Impression:  Acute left femoral neck fracture. No acute dislocation of the left femoral head. Mild osteoarthritis at the right and left hip joint. Intact pelvis No acute foreign body.  This report was finalized on 2/23/2025 12:18 AM by Eamon Omalley MD.      CT Cervical Spine Without Contrast    Result Date: 2/23/2025   No acute fracture or dislocation. No apical pneumothorax.  This report was finalized on 2/23/2025 12:16 AM by Eamon Omalley MD.      CT Head Without Contrast    Result Date: 2/23/2025  Impression:  Moderate generalized brain volume loss with mild to moderate chronic small vessel ischemic type changes in the white matter. Ex-vacuo dilatation of the ventricular system due to underlying deep white matter parenchymal volume loss No acute intracranial hemorrhage, mass, infarct, or edema. Minimal mucosal thickening in the ethmoid air cells Partial opacification of the left mastoid air cells. No fracture or dislocation. No scalp hematoma.  This report was finalized on  2/23/2025 12:15 AM by Eamon Omalley MD.     I have personally looked at the radiology images and read the final radiology report.    Assessment & Plan    Status post left hip fracture requiring ORIF begin PT and OT.  Pain management.    UTI/questionable left lower lobe pneumonia currently being treated empirically with Rocephin and Zithromax.    Severe Alzheimer's type dementia supportive care    Hypertension continue continue current treatment    GERD continue PPI    VTE Prophylaxis:   Lovenox        Deandre Hernandez MD  [facility]  02/25/25  14:24 EST

## 2025-02-25 NOTE — PLAN OF CARE
Goal Outcome Evaluation:  Plan of Care Reviewed With: patient        Progress: no change  Outcome Evaluation: Pt resting in bed at this time. Pt turned every 2 hours. Pt unable to ambulate, but stood at bedside with PT this shift. No acute changes or complaints noted at this time. Plan of care ongoing.

## 2025-02-25 NOTE — PROGRESS NOTES
Antimicrobial Length of Therapy:    Day 3 Rocephin  Day 2 Zithromax    Thank you,    Malka Wright, PharmD

## 2025-02-25 NOTE — PROGRESS NOTES
LOS: 2 days     Chief Complaint: Left hip fracture    Subjective     Interval History:   Postoperative day 2 left hemiarthroplasty secondary to femoral neck fracture.  Patient asleep in bed, does not respond to stimuli.  Spouse at bedside.  He states patient had difficult night with confusion altered mental status.  Patient has not participated in physical therapy postoperatively.        Objective     Vital Signs  Temp:  [97.8 °F (36.6 °C)-101 °F (38.3 °C)] 97.8 °F (36.6 °C)  Resp:  [16-20] 18  BP: ()/(52-62) 109/55    Labs & Rads  Lab Results (last 72 hours)       Procedure Component Value Units Date/Time    Calcium, Ionized [[id number]]  (Abnormal) Collected: 02/25/25 0752    Specimen: Blood Updated: 02/25/25 0813     Ionized Calcium 1.08 mmol/L     Basic Metabolic Panel [[id number]]  (Abnormal) Collected: 02/25/25 0239    Specimen: Blood Updated: 02/25/25 0348     Glucose 133 mg/dL      BUN 12 mg/dL      Creatinine 0.78 mg/dL      Sodium 136 mmol/L      Potassium 3.7 mmol/L      Chloride 105 mmol/L      CO2 23.2 mmol/L      Calcium 8.0 mg/dL      BUN/Creatinine Ratio 15.4     Anion Gap 7.8 mmol/L      eGFR 75.5 mL/min/1.73     Narrative:      GFR Categories in Chronic Kidney Disease (CKD)      GFR Category          GFR (mL/min/1.73)    Interpretation  G1                     90 or greater         Normal or high (1)  G2                      60-89                Mild decrease (1)  G3a                   45-59                Mild to moderate decrease  G3b                   30-44                Moderate to severe decrease  G4                    15-29                Severe decrease  G5                    14 or less           Kidney failure          (1)In the absence of evidence of kidney disease, neither GFR category G1 or G2 fulfill the criteria for CKD.    eGFR calculation 2021 CKD-EPI creatinine equation, which does not include race as a factor    CBC & Differential [[id number]]  (Abnormal) Collected:  02/25/25 0239    Specimen: Blood Updated: 02/25/25 0318    Narrative:      The following orders were created for panel order CBC & Differential.  Procedure                               Abnormality         Status                     ---------                               -----------         ------                     CBC Auto Differential[[id number]]        Abnormal            Final result                 Please view results for these tests on the individual orders.    CBC Auto Differential [[id number]]  (Abnormal) Collected: 02/25/25 0239    Specimen: Blood Updated: 02/25/25 0318     WBC 12.69 10*3/mm3      RBC 3.41 10*6/mm3      Hemoglobin 10.7 g/dL      Hematocrit 33.4 %      MCV 97.9 fL      MCH 31.4 pg      MCHC 32.0 g/dL      RDW 12.9 %      RDW-SD 46.0 fl      MPV 9.0 fL      Platelets 203 10*3/mm3      Neutrophil % 81.8 %      Lymphocyte % 10.4 %      Monocyte % 5.8 %      Eosinophil % 1.1 %      Basophil % 0.4 %      Immature Grans % 0.5 %      Neutrophils, Absolute 10.38 10*3/mm3      Lymphocytes, Absolute 1.32 10*3/mm3      Monocytes, Absolute 0.74 10*3/mm3      Eosinophils, Absolute 0.14 10*3/mm3      Basophils, Absolute 0.05 10*3/mm3      Immature Grans, Absolute 0.06 10*3/mm3      nRBC 0.0 /100 WBC     Blood Culture - Blood, Arm, Left [[id number]] Collected: 02/24/25 1441    Specimen: Blood from Arm, Left Updated: 02/24/25 1455    Procalcitonin [[id number]]  (Abnormal) Collected: 02/24/25 1004    Specimen: Blood Updated: 02/24/25 1412     Procalcitonin 1.31 ng/mL     Narrative:      As a Marker for Sepsis (Non-Neonates):    1. <0.5 ng/mL represents a low risk of severe sepsis and/or septic shock.  2. >2 ng/mL represents a high risk of severe sepsis and/or septic shock.    As a Marker for Lower Respiratory Tract Infections that require antibiotic therapy:    PCT on Admission    Antibiotic Therapy       6-12 Hrs later    >0.5                Strongly Recommended  >0.25 - <0.5        Recommended   0.1 - 0.25   "        Discouraged              Remeasure/reassess PCT  <0.1                Strongly Discouraged     Remeasure/reassess PCT    As 28 day mortality risk marker: \"Change in Procalcitonin Result\" (>80% or <=80%) if Day 0 (or Day 1) and Day 4 values are available. Refer to http://[email or web address]-pct-[email or web address]    Change in PCT <=80%  A decrease of PCT levels below or equal to 80% defines a positive change in PCT test result representing a higher risk for 28-day all-cause mortality of patients diagnosed with severe sepsis for septic shock.    Change in PCT >80%  A decrease of PCT levels of more than 80% defines a negative change in PCT result representing a lower risk for 28-day all-cause mortality of patients diagnosed with severe sepsis or septic shock.       Blood Culture - Blood, Arm, Left [[id number]] Collected: 02/24/25 1403    Specimen: Blood from Arm, Left Updated: 02/24/25 1412    Urine Culture - Urine, Urine, Clean Catch [[id number]]  (Abnormal) Collected: 02/23/25 0537    Specimen: Urine, Clean Catch Updated: 02/24/25 1211     Urine Culture >100,000 CFU/mL Gram Negative Bacilli    Narrative:      Colonization of the urinary tract without infection is common. Treatment is discouraged unless the patient is symptomatic, pregnant, or undergoing an invasive urologic procedure.    Manual Differential [[id number]]  (Abnormal) Collected: 02/23/25 1536    Specimen: Blood Updated: 02/23/25 1800     Neutrophil % 86.0 %      Lymphocyte % 7.0 %      Monocyte % 5.0 %      Basophil % 1.0 %      Myelocyte % 1.0 %      Neutrophils Absolute 14.51 10*3/mm3      Lymphocytes Absolute 1.18 10*3/mm3      Monocytes Absolute 0.84 10*3/mm3      Basophils Absolute 0.17 10*3/mm3      RBC Morphology Normal     Platelet Estimate Adequate    Respiratory Panel PCR w/COVID-19(SARS-CoV-2) HUNG/LANDEN/CHEMA/PAD/COR/DINO In-House, NP Swab in UTM/VTM, 2 HR TAT - Swab, Nasopharynx [[id number]]  (Normal) Collected: 02/23/25 1644    Specimen: Swab from " Nasopharynx Updated: 02/23/25 1750     ADENOVIRUS, PCR Not Detected     Coronavirus 229E Not Detected     Coronavirus HKU1 Not Detected     Coronavirus NL63 Not Detected     Coronavirus OC43 Not Detected     COVID19 Not Detected     Human Metapneumovirus Not Detected     Human Rhinovirus/Enterovirus Not Detected     Influenza A PCR Not Detected     Influenza B PCR Not Detected     Parainfluenza Virus 1 Not Detected     Parainfluenza Virus 2 Not Detected     Parainfluenza Virus 3 Not Detected     Parainfluenza Virus 4 Not Detected     RSV, PCR Not Detected     Bordetella pertussis pcr Not Detected     Bordetella parapertussis PCR Not Detected     Chlamydophila pneumoniae PCR Not Detected     Mycoplasma pneumo by PCR Not Detected    Narrative:      In the setting of a positive respiratory panel with a viral infection PLUS a negative procalcitonin without other underlying concern for bacterial infection, consider observing off antibiotics or discontinuation of antibiotics and continue supportive care. If the respiratory panel is positive for atypical bacterial infection (Bordetella pertussis, Chlamydophila pneumoniae, or Mycoplasma pneumoniae), consider antibiotic de-escalation to target atypical bacterial infection.    Comprehensive Metabolic Panel [[id number]]  (Abnormal) Collected: 02/23/25 1536    Specimen: Blood Updated: 02/23/25 1714     Glucose 118 mg/dL      BUN 12 mg/dL      Creatinine 0.81 mg/dL      Sodium 137 mmol/L      Potassium 4.3 mmol/L      Comment: Slight hemolysis detected by analyzer. Result may be falsely elevated.        Chloride 103 mmol/L      CO2 22.9 mmol/L      Calcium 7.9 mg/dL      Total Protein 5.0 g/dL      Albumin 3.3 g/dL      ALT (SGPT) 11 U/L      AST (SGOT) 18 U/L      Alkaline Phosphatase 70 U/L      Total Bilirubin 0.4 mg/dL      Globulin 1.7 gm/dL      A/G Ratio 1.9 g/dL      BUN/Creatinine Ratio 14.8     Anion Gap 11.1 mmol/L      eGFR 72.1 mL/min/1.73     Narrative:      GFR  Categories in Chronic Kidney Disease (CKD)      GFR Category          GFR (mL/min/1.73)    Interpretation  G1                     90 or greater         Normal or high (1)  G2                      60-89                Mild decrease (1)  G3a                   45-59                Mild to moderate decrease  G3b                   30-44                Moderate to severe decrease  G4                    15-29                Severe decrease  G5                    14 or less           Kidney failure          (1)In the absence of evidence of kidney disease, neither GFR category G1 or G2 fulfill the criteria for CKD.    eGFR calculation 2021 CKD-EPI creatinine equation, which does not include race as a factor    CBC & Differential [[id number]]  (Abnormal) Collected: 02/23/25 1536    Specimen: Blood Updated: 02/23/25 1659    Narrative:      The following orders were created for panel order CBC & Differential.  Procedure                               Abnormality         Status                     ---------                               -----------         ------                     CBC Auto Differential[[id number]]        Abnormal            Final result               Scan Slide[[id number]]                                                                    Please view results for these tests on the individual orders.    CBC Auto Differential [[id number]]  (Abnormal) Collected: 02/23/25 1536    Specimen: Blood Updated: 02/23/25 1659     WBC 16.87 10*3/mm3      RBC 3.57 10*6/mm3      Hemoglobin 11.4 g/dL      Hematocrit 34.6 %      MCV 96.9 fL      MCH 31.9 pg      MCHC 32.9 g/dL      RDW 13.1 %      RDW-SD 47.0 fl      MPV 9.6 fL      Platelets 190 10*3/mm3     Blood Gas, Arterial With Co-Ox [[id number]]  (Abnormal) Collected: 02/23/25 1602    Specimen: Arterial Blood Updated: 02/23/25 1603     Site Right Radial     Samy's Test Positive     pH, Arterial 7.397 pH units      pCO2, Arterial 40.0 mm Hg      pO2, Arterial 55.8 mm Hg       Comment: 84 Value below reference range        HCO3, Arterial 24.6 mmol/L      Base Excess, Arterial -0.2 mmol/L      O2 Saturation, Arterial 91.4 %      Comment: 84 Value below reference range        Hemoglobin, Blood Gas 10.7 g/dL      Comment: 84 Value below reference range        Hematocrit, Blood Gas 32.8 %      Comment: 84 Value below reference range        Oxyhemoglobin 89.0 %      Comment: 84 Value below reference range        Methemoglobin 0.50 %      Carboxyhemoglobin 2.1 %      A-a DO2 42.1 mmHg      CO2 Content 25.8 mmol/L      Barometric Pressure for Blood Gas 728 mmHg      Modality Room Air     FIO2 21 %      Ventilator Mode NA     Collected by [id number]     Comment: Meter: [id number]     :  [id number]        pH, Temp Corrected --     pCO2, Temperature Corrected --     pO2, Temperature Corrected --    [place]ell Urine Culture Tube - Urine, Clean Catch [[id number]] Collected: 02/23/25 0537    Specimen: Urine, Clean Catch Updated: 02/23/25 0603     Extra Tube Hold for add-ons.     Comment: Auto resulted.       Urinalysis, Microscopic Only - Urine, Clean Catch [[id number]]  (Abnormal) Collected: 02/23/25 0537    Specimen: Urine, Clean Catch Updated: 02/23/25 0602     RBC, UA None Seen /HPF      WBC, UA 6-10 /HPF      Bacteria, UA 4+ /HPF      Squamous Epithelial Cells, UA 0-2 /HPF      Hyaline Casts, UA None Seen /LPF      Methodology Manual Light Microscopy    Urinalysis With Microscopic If Indicated (No Culture) - Urine, Clean Catch [[id number]]  (Abnormal) Collected: 02/23/25 0537    Specimen: Urine, Clean Catch Updated: 02/23/25 0558     Color, UA Yellow     Appearance, UA Clear     pH, UA 8.0     Specific Gravity, UA 1.018     Glucose, UA Negative     Ketones, UA Negative     Bilirubin, UA Negative     Blood, UA Negative     Protein, UA Negative     Leuk Esterase, UA Small (1+)     Nitrite, UA Positive     Urobilinogen, UA 1.0 E.U./dL    Comprehensive Metabolic Panel [[id number]]  (Abnormal)  Collected: 02/23/25 0508    Specimen: Blood Updated: 02/23/25 0534     Glucose 147 mg/dL      BUN 12 mg/dL      Creatinine 0.77 mg/dL      Sodium 137 mmol/L      Potassium 3.9 mmol/L      Chloride 102 mmol/L      CO2 25.5 mmol/L      Calcium 8.7 mg/dL      Total Protein 6.6 g/dL      Albumin 4.1 g/dL      ALT (SGPT) 12 U/L      AST (SGOT) 17 U/L      Alkaline Phosphatase 93 U/L      Total Bilirubin 0.4 mg/dL      Globulin 2.5 gm/dL      A/G Ratio 1.6 g/dL      BUN/Creatinine Ratio 15.6     Anion Gap 9.5 mmol/L      eGFR 76.6 mL/min/1.73     Narrative:      GFR Categories in Chronic Kidney Disease (CKD)      GFR Category          GFR (mL/min/1.73)    Interpretation  G1                     90 or greater         Normal or high (1)  G2                      60-89                Mild decrease (1)  G3a                   45-59                Mild to moderate decrease  G3b                   30-44                Moderate to severe decrease  G4                    15-29                Severe decrease  G5                    14 or less           Kidney failure          (1)In the absence of evidence of kidney disease, neither GFR category G1 or G2 fulfill the criteria for CKD.    eGFR calculation 2021 CKD-EPI creatinine equation, which does not include race as a factor    Protime-INR [[id number]]  (Normal) Collected: 02/23/25 0508    Specimen: Blood Updated: 02/23/25 0522     Protime 13.0 Seconds      INR 0.97    Narrative:      Suggested INR therapeutic range for stable oral anticoagulant therapy:    Low Intensity therapy:   1.5-2.0  Moderate Intensity therapy:   2.0-3.0  High Intensity therapy:   2.5-4.0    CBC Auto Differential [[id number]]  (Abnormal) Collected: 02/23/25 0508    Specimen: Blood Updated: 02/23/25 0513     WBC 14.15 10*3/mm3      RBC 4.41 10*6/mm3      Hemoglobin 13.8 g/dL      Hematocrit 42.5 %      MCV 96.4 fL      MCH 31.3 pg      MCHC 32.5 g/dL      RDW 12.6 %      RDW-SD 45.2 fl      MPV 8.7 fL      Platelets  247 10*3/mm3      Neutrophil % 85.2 %      Lymphocyte % 8.6 %      Monocyte % 5.2 %      Eosinophil % 0.0 %      Basophil % 0.4 %      Immature Grans % 0.6 %      Neutrophils, Absolute 12.06 10*3/mm3      Lymphocytes, Absolute 1.21 10*3/mm3      Monocytes, Absolute 0.74 10*3/mm3      Eosinophils, Absolute 0.00 10*3/mm3      Basophils, Absolute 0.06 10*3/mm3      Immature Grans, Absolute 0.08 10*3/mm3      nRBC 0.0 /100 WBC     TSH [[id number]]  (Normal) Collected: 02/23/25 0155    Specimen: Blood from Arm, Left Updated: 02/23/25 0458     TSH 1.800 uIU/mL     Magnesium [[id number]]  (Normal) Collected: 02/23/25 0155    Specimen: Blood from Arm, Left Updated: 02/23/25 0445     Magnesium 2.3 mg/dL     Comprehensive Metabolic Panel [[id number]]  (Abnormal) Collected: 02/23/25 0155    Specimen: Blood from Arm, Left Updated: 02/23/25 0222     Glucose 171 mg/dL      BUN 14 mg/dL      Creatinine 0.81 mg/dL      Sodium 136 mmol/L      Potassium 3.8 mmol/L      Chloride 101 mmol/L      CO2 23.9 mmol/L      Calcium 9.1 mg/dL      Total Protein 7.0 g/dL      Albumin 4.1 g/dL      ALT (SGPT) 12 U/L      AST (SGOT) 16 U/L      Alkaline Phosphatase 100 U/L      Total Bilirubin 0.4 mg/dL      Globulin 2.9 gm/dL      A/G Ratio 1.4 g/dL      BUN/Creatinine Ratio 17.3     Anion Gap 11.1 mmol/L      eGFR 72.1 mL/min/1.73     Narrative:      GFR Categories in Chronic Kidney Disease (CKD)      GFR Category          GFR (mL/min/1.73)    Interpretation  G1                     90 or greater         Normal or high (1)  G2                      60-89                Mild decrease (1)  G3a                   45-59                Mild to moderate decrease  G3b                   30-44                Moderate to severe decrease  G4                    15-29                Severe decrease  G5                    14 or less           Kidney failure          (1)In the absence of evidence of kidney disease, neither GFR category G1 or G2 fulfill the criteria  for CKD.    eGFR calculation 2021 CKD-EPI creatinine equation, which does not include race as a factor    CBC & Differential [[id number]]  (Abnormal) Collected: 02/23/25 0155    Specimen: Blood from Arm, Left Updated: 02/23/25 0201    Narrative:      The following orders were created for panel order CBC & Differential.  Procedure                               Abnormality         Status                     ---------                               -----------         ------                     CBC Auto Differential[[id number]]        Abnormal            Final result                 Please view results for these tests on the individual orders.    CBC Auto Differential [[id number]]  (Abnormal) Collected: 02/23/25 0155    Specimen: Blood from Arm, Left Updated: 02/23/25 0201     WBC 16.27 10*3/mm3      RBC 4.59 10*6/mm3      Hemoglobin 14.2 g/dL      Hematocrit 43.7 %      MCV 95.2 fL      MCH 30.9 pg      MCHC 32.5 g/dL      RDW 12.6 %      RDW-SD 44.5 fl      MPV 8.7 fL      Platelets 258 10*3/mm3      Neutrophil % 89.9 %      Lymphocyte % 5.1 %      Monocyte % 4.4 %      Eosinophil % 0.0 %      Basophil % 0.2 %      Immature Grans % 0.4 %      Neutrophils, Absolute 14.62 10*3/mm3      Lymphocytes, Absolute 0.83 10*3/mm3      Monocytes, Absolute 0.71 10*3/mm3      Eosinophils, Absolute 0.00 10*3/mm3      Basophils, Absolute 0.04 10*3/mm3      Immature Grans, Absolute 0.07 10*3/mm3      nRBC 0.0 /100 WBC     Orla Draw [[id number]] Collected: 02/23/25 0155    Specimen: Blood from Arm, Left Updated: 02/23/25 0200    Narrative:      The following orders were created for panel order Orla Draw.  Procedure                               Abnormality         Status                     ---------                               -----------         ------                     Green Top (Gel)[[id number]]                                  Final result               Lavender Top[[id number]]                                     Final result                Gold Top - SST[[id number]]                                   Final result               Light Blue Top[[id number]]                                   Final result                 Please view results for these tests on the individual orders.    Green Top (Gel) [[id number]] Collected: 02/23/25 0155    Specimen: Blood from Arm, Left Updated: 02/23/25 0200     Extra Tube Hold for add-ons.     Comment: Auto resulted.       Lavender Top [[id number]] Collected: 02/23/25 0155    Specimen: Blood from Arm, Left Updated: 02/23/25 0200     Extra Tube hold for add-on     Comment: Auto resulted       Gold Top - SST [[id number]] Collected: 02/23/25 0155    Specimen: Blood from Arm, Left Updated: 02/23/25 0200     Extra Tube Hold for add-ons.     Comment: Auto resulted.       Light Blue Top [[id number]] Collected: 02/23/25 0155    Specimen: Blood from Arm, Left Updated: 02/23/25 0200     Extra Tube Hold for add-ons.     Comment: Auto resulted             Imaging Results (Last 72 Hours)       Procedure Component Value Units Date/Time    XR Pelvis 1 or 2 View [[id number]] Collected: 02/24/25 0829     Updated: 02/24/25 0832    Narrative:      EXAM:    XR Pelvis, 1 or 2 Views     EXAM DATE:    2/23/2025 11:03 AM     CLINICAL HISTORY:    s/p left hip lexi, in pacu; S72.002A Fracture of unspecified part of  neck of left femur, initial encounter for closed fracture; S72.002A  Fracture of unspecified part of neck of left femur, initial encounter  for closed fracture     TECHNIQUE:    Frontal view of the pelvis.     COMPARISON:    2/22/2025     FINDINGS:    BONES/JOINTS:  Left total hip arthroplasty.  Components appear well  seated.  No acute fracture.  No dislocation.    SOFT TISSUES:  Unremarkable as visualized.       Impression:        Left total hip arthroplasty. Components appear well seated.     This report was finalized on 2/24/2025 8:30 AM by Dr. Bradley Rausch MD.       XR Chest 1 View [[id number]] Collected: 02/23/25 1938      Updated: 02/1941    Narrative:      PROCEDURE: X-ray examination of the chest performed on February 23, 2025. Single view. Upright position.     HISTORY: Hypoxia.     COMPARISON: None.     FINDINGS:     Mild enlarged heart size  Coarsened bronchovascular pattern to the lungs  Mild atelectasis at the lung bases  Left lower lobe pneumonia.  No pleural effusion or pneumothorax.  Mild dextroconvex curve at the mid thoracic spine.  Azygos lobe variant in the right upper lobe  No lytic or blastic lesion.  No free air in the upper abdomen.       Impression:         Mild enlarged heart size.  Coarsened bronchovascular pattern to the lungs  Possible left lower lobe pneumonia.  Azygos lobe variant in the right upper lobe  No fracture or dislocation.     This report was finalized on 2/23/2025 7:39 PM by Eamon Omalley MD.       XR Chest 1 View [[id number]] Collected: 02/23/25 0525     Updated: 02/23/25 0529    Narrative:      PROCEDURE: Portable chest x-ray examination performed on February 23, 2025. Single view.     HISTORY: Preoperative evaluation.     COMPARISON: None.     FINDINGS:     Normal heart size.  Left lower lobe atelectasis  Hypoinflated lungs  Coarsened bronchovascular pattern to the lungs  Azygos lobe variant.  Possible cholelithiasis.  No free air in the upper abdomen.  No pleural effusion.  No pneumothorax.       Impression:         Normal heart size  Hypoinflated lungs  Coarsened bronchovascular pattern to the lungs.  Left lower lobe atelectasis versus pneumonia.  No pleural effusion or pneumothorax  No fracture or foreign body.     This report was finalized on 2/23/2025 5:27 AM by Eamon Omalley MD.       XR Tibia Fibula 2 View Left [[id number]] Collected: 02/23/25 0019     Updated: 02/23/25 0022    Narrative:      PROCEDURE: X-ray examination of the left tibia and fibula performed on  February 22, 2025. 3 views.     HISTORY: Fall. Leg pain.     COMPARISON: None.     FINDINGS:     Intact left tibia and  fibula with no acute fracture or dislocation.  No lytic or blastic lesion. No foreign body.  No tibiotalar joint effusion.  No soft tissue swelling.       Impression:         Intact left tibia and fibula with no acute fracture or dislocation.  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  Mild osteoarthritis at the left tibiotalar joint.  No foreign body.           This report was finalized on 2/23/2025 12:20 AM by Eamon Omalley MD.       XR Femur 2 View Left [[id number]] Collected: 02/23/25 0018     Updated: 02/23/25 0021    Narrative:      PROCEDURE: X-ray examination of the left femur performed on February 22, 2025. 4 films.     HISTORY: Left leg pain. Hip pain. Fall.     COMPARISON: None.     FINDINGS:     Acute left femoral neck fracture.  No acute dislocation of the left femoral head.  Mild osteoarthritis at the left hip joint  Mild osteoarthritis at the left knee.  No acute foreign body.       Impression:         Acute left femoral neck fracture.  No acute dislocation.  Osteoarthritis.     This report was finalized on 2/23/2025 12:19 AM by Eamon Omalley MD.       XR Hip With or Without Pelvis 2 - 3 View Left [[id number]] Collected: 02/23/25 0016     Updated: 02/23/25 0020    Narrative:      PROCEDURE: X-ray examination of the pelvis and left hip performed on  February 22, 2025. 3 views.     HISTORY: Fall. Pain.     COMPARISON: None.     FINDINGS:     Intact pelvis  Intact SI joints and intact pubic symphysis.  Mild osteoarthritis at the right and left hip joint.  Degenerative disc disease in the lower lumbar spine.  Acute left femoral neck fracture.   No acute dislocation of the left femoral head.       Impression:      Impression:     Acute left femoral neck fracture.   No acute dislocation of the left femoral head.  Mild osteoarthritis at the right and left hip joint.  Intact pelvis  No acute foreign body.     This report was finalized on 2/23/2025 12:18 AM by Eamon Omalley MD.       CT Cervical Spine Without  Contrast [[id number]] Collected: 02/23/25 0015     Updated: 02/23/25 0018    Narrative:      PROCEDURE: CT of the cervical spine performed without IV contrast on  February 22, 2025. The examination was performed with 2 mm axial imaging  and 2 mm sagittal coronal reconstruction images. Examination was  performed according to as low as reasonably achievable dose protocol.     HISTORY: Fall. Head injury. Neck injury.     COMPARISON: None.     FINDINGS:     Anatomic alignment of the cervical vertebral bodies with no acute  fracture or dislocation.  Normal thickness of the prevertebral soft tissues  No lytic or blastic lesion.       Impression:         No acute fracture or dislocation.   No apical pneumothorax.     This report was finalized on 2/23/2025 12:16 AM by Eamon Omalley MD.       CT Head Without Contrast [[id number]] Collected: 02/23/25 0012     Updated: 02/23/25 0017    Narrative:      PROCEDURE: CT brain performed without IV contrast on February 22, 2025.  The examination was performed with 3 mm axial imaging and 3 mm sagittal  and coronal reconstruction images. The examination was performed  according to as low as reasonably achievable dose protocol.     HISTORY: Fall. Head injury. Neck injury.     COMPARISON: None.     FINDINGS:     Moderate generalized brain volume loss  Mild to moderate chronic small vessel ischemic type changes in the white  matter.  Deep white matter parenchymal volume loss with associated ex vacuo  dilatation of the lateral ventricles and basal cisterns.  No acute intracranial hemorrhage, mass, infarct, or edema.  Mild mucosal thickening in the ethmoid air cells.  High jugular bulb noted on the right side.  Debris noted within the right and left external auditory canal, right  greater than left.  Left side mastoiditis  Minimal mucosal thickening in the ethmoid air cells  No fracture.  No scalp hematoma.       Impression:      Impression:     Moderate generalized brain volume loss with  mild to moderate chronic  small vessel ischemic type changes in the white matter.  Ex-vacuo dilatation of the ventricular system due to underlying deep  white matter parenchymal volume loss  No acute intracranial hemorrhage, mass, infarct, or edema.  Minimal mucosal thickening in the ethmoid air cells  Partial opacification of the left mastoid air cells.  No fracture or dislocation.  No scalp hematoma.     This report was finalized on 2/23/2025 12:15 AM by Eamon Omalley MD.               Physical Exam:   Physical Exam  Vitals reviewed.   Constitutional:       General: She is not in acute distress.     Comments: Patient currently does not awaken to stimuli.  Appears sleeping in bed eyes closed.   HENT:      Head: Normocephalic.   Pulmonary:      Effort: Pulmonary effort is normal. No respiratory distress.   Musculoskeletal:      Comments: Dressing CDI left hip.  Thigh appears supple without sign of hematoma.+PP   Skin:     General: Skin is warm and dry.   Neurological:      Comments: Currently does not awaken to stimuli.   Psychiatric:      Comments: Patient currently sleeping.          Results Review:     I reviewed the patient's new clinical results.      Assessment & Plan     Principal Problem:    Hip fracture    Postoperative day 2 left hip hemiarthroplasty secondary to femoral neck fracture.    PT/OT: WBAT with assistive device, anterior hip precautions, no active abduction.  Therapy recommendations/expectations discussed at length with patient's spouse.    Wound care: Maintain dressing at this time may reinforce as needed for saturation.    DVT/PPx: Lovenox 40 mg subcu daily x 14 days.    Make follow-up appointment in orthopedic office in 2 weeks upon discharge.      Plan for disposition: D hospitalist    Ivette Gandhi, HUBERT  02/25/25  10:00 EST

## 2025-02-25 NOTE — CASE MANAGEMENT/SOCIAL WORK
Discharge Planning Assessment   [facility]     Patient Name: Crista Blunt  MRN: [id number]  Today's Date: 2/25/2025    Admit Date: 2/22/2025    Plan: Pt was discussed on MD rounds on this date. SS attempted to follow up with pt's spouse about rehab options. SS to follow up on 2/26/25. SS to follow.       Discharge Plan       Row Name 02/25/25 1703       Plan    Plan Pt was discussed on MD rounds on this date. SS attempted to follow up with pt's spouse about rehab options. SS to follow up on 2/26/25. SS to follow.        Continued Care and Services - Admitted Since 2/22/2025    No active coordination exists for this encounter.       Expected Discharge Date and Time       Expected Discharge Date Expected Discharge Time    Feb 27, 2025        YULIYA Valdivia

## 2025-02-26 PROCEDURE — 99232 SBSQ HOSP IP/OBS MODERATE 35: CPT | Performed by: FAMILY MEDICINE

## 2025-02-26 PROCEDURE — 25010000002 ENOXAPARIN PER 10 MG: Performed by: GENERAL PRACTICE

## 2025-02-26 PROCEDURE — 97530 THERAPEUTIC ACTIVITIES: CPT

## 2025-02-26 PROCEDURE — 97110 THERAPEUTIC EXERCISES: CPT

## 2025-02-26 RX ORDER — CEFDINIR 300 MG/1
300 CAPSULE ORAL EVERY 12 HOURS SCHEDULED
Status: COMPLETED | OUTPATIENT
Start: 2025-02-26 | End: 2025-03-04

## 2025-02-26 RX ADMIN — Medication 10 ML: at 20:01

## 2025-02-26 RX ADMIN — Medication 10 ML: at 08:57

## 2025-02-26 RX ADMIN — ENOXAPARIN SODIUM 40 MG: 100 INJECTION SUBCUTANEOUS at 08:57

## 2025-02-26 RX ADMIN — AZITHROMYCIN DIHYDRATE 250 MG: 250 TABLET ORAL at 08:57

## 2025-02-26 RX ADMIN — CEFDINIR 300 MG: 300 CAPSULE ORAL at 12:10

## 2025-02-26 RX ADMIN — CEFDINIR 300 MG: 300 CAPSULE ORAL at 20:01

## 2025-02-26 NOTE — PLAN OF CARE
Goal Outcome Evaluation:  Plan of Care Reviewed With: patient        Progress: improving  Outcome Evaluation: Patient resting in bed at this time. VSS on RA.  at bedside. No acute changes noted at this time. Patient voices no complaints. Will continue with plan of care.

## 2025-02-26 NOTE — CASE MANAGEMENT/SOCIAL WORK
Discharge Planning Assessment   [place]     Patient Name: Crista Blunt  MRN: [id number]  Today's Date: 2/26/2025    Admit Date: 2/22/2025       Discharge Plan       Row Name 02/26/25 1240       Plan    Plan SS spoke with Pt, Pt's spouse at bedside on this date. Pt;s spouse is agreeable to SNF placement for short term rehab, prefers [facility]. SS contacted [facility] per Kim who states she does not have any female beds avialable. SS to follow up with spouse and ask about other SNF locations. SS to follow.    14:46pm: SS spoke with Pt's spouse at bedside and made him aware that [facility] did not have any beds available. Spouse asked about inpatient rehab. SS explained that Pt would not be able to get three hours of therapy. SS notified Provider. Spouse states he will research [facility] and [facility]. SS to follow.                   Continued Care and Services - Admitted Since 2/22/2025       Destination       Service Provider Request Status Services Address Phone Fax Patient Preferred    [facility] Declined  Bed not available -- [address] [place] [address] [phone] --                    YULIYA Pickering

## 2025-02-26 NOTE — THERAPY TREATMENT NOTE
Acute Care - Physical Therapy Treatment Note   [facility]     Patient Name: Crista Blunt  : 1940  MRN: [id number]  Today's Date: 2025   Onset of Illness/Injury or Date of Surgery: 25  Visit Dx:     ICD-10-CM ICD-9-CM   1. Closed fracture of neck of left femur, initial encounter  S72.002A 820.8   2. Closed fracture of left hip, initial encounter  S72.002A 820.8     Patient Active Problem List   Diagnosis    Hip fracture     History reviewed. No pertinent past medical history.  History reviewed. No pertinent surgical history.  PT Assessment (Last 12 Hours)       PT Evaluation and Treatment       Row Name 25 1500          Physical Therapy Time and Intention    Document Type therapy note (daily note)  -     Mode of Treatment physical therapy  -     Patient Effort good  -     Comment Pt seen for treatment this PM, pleasant and cooperative with therapy. Pt agreeable to sit EOB and some LE TE while supine.  -       Row Name 25 1500          Bed Mobility    Bed Mobility supine-sit;sit-supine  -     Supine-Sit [place] (Bed Mobility) maximum assist (25% patient effort);dependent (less than 25% patient effort)  -     Sit-Supine [place] (Bed Mobility) maximum assist (25% patient effort);dependent (less than 25% patient effort)  -     Comment, (Bed Mobility) able to sit EOB however transfers deferred 2/2 pain and difficulty sitting EOB  -       Row Name 25 1500          Motor Skills    Therapeutic Exercise hip;knee  hip abd/add grossly 3x10 with some AA; quad sets/activation grossly 2-3x10  -       Row Name             Wound 25 0959 Left anterior thigh    Wound - Properties Group Placement Date: 25  -JG Placement Time: 959JG Side: Left  -JG Orientation: anterior  -JG Location: thigh  -JG    Retired Wound - Properties Group Placement Date: 25  -JG Placement Time: 959JG Side: Left  -JG Orientation: anterior  -JG Location: thigh  -JG    Retired  Wound - Properties Group Placement Date: 02/23/25  -JG Placement Time: 0959 -JG Side: Left  -JG Orientation: anterior  -JG Location: thigh  -JG    Retired Wound - Properties Group Date first assessed: 02/23/25  -JG Time first assessed: 0959 -JG Side: Left  -JG Location: thigh  -JG      Row Name 02/26/25 1500          Positioning and Restraints    Pre-Treatment Position in bed  -     Post Treatment Position bed  -     In Bed supine;with family/caregiver;exit alarm on  -               User Key  (r) = Recorded By, (t) = Taken By, (c) = Cosigned By      Initials Name Provider Type    Kristin Avilez, RN Registered Nurse    Barbara Galarza, PT Physical Therapist                    Physical Therapy Education        No education to display                  PT Recommendation and Plan     Progress Summary (PT)  Daily Progress Summary (PT): Pt seen for treatment this date, participation was good, confusion limits pt participation; grossly max/depA at this time, no change to POC. Prognosis guarded to fair.       Time Calculation:    PT Charges       Row Name 02/26/25 1515             Time Calculation    Start Time 1325  -KH      PT Received On 02/26/25  -         Time Calculation- PT    Total Timed Code Minutes- PT 23 minute(s)  -                User Key  (r) = Recorded By, (t) = Taken By, (c) = Cosigned By      Initials Name Provider Type    Barbara Galarza, HAYLEE Physical Therapist                  Therapy Charges for Today       Code Description Service Date Service Provider Modifiers Qty    38223184141 HC PT THER PROC EA 15 MIN 2/25/2025 Barbara De La Cruz, PT GP 1    87791198569 HC PT THERAPEUTIC ACT EA 15 MIN 2/25/2025 Barbara eD La Cruz, PT GP 1    28322410022 HC PT THER PROC EA 15 MIN 2/26/2025 Barbara De La Cruz, PT GP 1    79596252111 HC PT THERAPEUTIC ACT EA 15 MIN 2/26/2025 Barbara De La Cruz, PT GP 1            PT G-Codes  AM-PAC 6 Clicks Score (PT): 6    Barbara De La Cruz, PT  2/26/2025

## 2025-02-26 NOTE — PROGRESS NOTES
LOS: 3 days     Chief Complaint: Left hip fracture    Subjective     Interval History:   Postoperative day 3 left hip hemiarthroplasty.  Patient asleep in bed, eyes closed.  NAD.  Spouse at bedside.  Providing information.  No acute events reported overnight.  Patient was able to sit on edge of bed yesterday and stand with physical therapy assistance.  Patient currently on antibiotics secondary to UTI.      Objective     Vital Signs  Temp:  [98.5 °F (36.9 °C)-98.9 °F (37.2 °C)] 98.5 °F (36.9 °C)  Heart Rate:  [] 93  Resp:  [18] 18  BP: (117-131)/(54-64) 131/64    Labs & Rads  Lab Results (last 72 hours)       Procedure Component Value Units Date/Time    Blood Culture - Blood, Arm, Left [[id number]]  (Normal) Collected: 02/24/25 1441    Specimen: Blood from Arm, Left Updated: 02/25/25 1501     Blood Culture No growth at 24 hours    Blood Culture - Blood, Arm, Left [[id number]]  (Normal) Collected: 02/24/25 1403    Specimen: Blood from Arm, Left Updated: 02/25/25 1415     Blood Culture No growth at 24 hours    Urine Culture - Urine, Urine, Clean Catch [[id number]]  (Abnormal)  (Susceptibility) Collected: 02/23/25 0537    Specimen: Urine, Clean Catch Updated: 02/25/25 1035     Urine Culture >100,000 CFU/mL Escherichia coli    Narrative:      Colonization of the urinary tract without infection is common. Treatment is discouraged unless the patient is symptomatic, pregnant, or undergoing an invasive urologic procedure.    Susceptibility        Escherichia coli      JANICE      Amoxicillin + Clavulanate Susceptible      Ampicillin Susceptible      Ampicillin + Sulbactam Susceptible      Cefazolin (Urine) Susceptible      Cefepime Susceptible      Ceftazidime Susceptible      Ceftriaxone Susceptible      Gentamicin Susceptible      Levofloxacin Susceptible      Nitrofurantoin Susceptible      Piperacillin + Tazobactam Susceptible      Trimethoprim + Sulfamethoxazole Susceptible                           Calcium,  Ionized [[id number]]  (Abnormal) Collected: 02/25/25 0752    Specimen: Blood Updated: 02/25/25 0813     Ionized Calcium 1.08 mmol/L     Basic Metabolic Panel [[id number]]  (Abnormal) Collected: 02/25/25 0239    Specimen: Blood Updated: 02/25/25 0348     Glucose 133 mg/dL      BUN 12 mg/dL      Creatinine 0.78 mg/dL      Sodium 136 mmol/L      Potassium 3.7 mmol/L      Chloride 105 mmol/L      CO2 23.2 mmol/L      Calcium 8.0 mg/dL      BUN/Creatinine Ratio 15.4     Anion Gap 7.8 mmol/L      eGFR 75.5 mL/min/1.73     Narrative:      GFR Categories in Chronic Kidney Disease (CKD)      GFR Category          GFR (mL/min/1.73)    Interpretation  G1                     90 or greater         Normal or high (1)  G2                      60-89                Mild decrease (1)  G3a                   45-59                Mild to moderate decrease  G3b                   30-44                Moderate to severe decrease  G4                    15-29                Severe decrease  G5                    14 or less           Kidney failure          (1)In the absence of evidence of kidney disease, neither GFR category G1 or G2 fulfill the criteria for CKD.    eGFR calculation 2021 CKD-EPI creatinine equation, which does not include race as a factor    CBC & Differential [[id number]]  (Abnormal) Collected: 02/25/25 0239    Specimen: Blood Updated: 02/25/25 0318    Narrative:      The following orders were created for panel order CBC & Differential.  Procedure                               Abnormality         Status                     ---------                               -----------         ------                     CBC Auto Differential[[id number]]        Abnormal            Final result                 Please view results for these tests on the individual orders.    CBC Auto Differential [[id number]]  (Abnormal) Collected: 02/25/25 0239    Specimen: Blood Updated: 02/25/25 0318     WBC 12.69 10*3/mm3      RBC 3.41 10*6/mm3       "Hemoglobin 10.7 g/dL      Hematocrit 33.4 %      MCV 97.9 fL      MCH 31.4 pg      MCHC 32.0 g/dL      RDW 12.9 %      RDW-SD 46.0 fl      MPV 9.0 fL      Platelets 203 10*3/mm3      Neutrophil % 81.8 %      Lymphocyte % 10.4 %      Monocyte % 5.8 %      Eosinophil % 1.1 %      Basophil % 0.4 %      Immature Grans % 0.5 %      Neutrophils, Absolute 10.38 10*3/mm3      Lymphocytes, Absolute 1.32 10*3/mm3      Monocytes, Absolute 0.74 10*3/mm3      Eosinophils, Absolute 0.14 10*3/mm3      Basophils, Absolute 0.05 10*3/mm3      Immature Grans, Absolute 0.06 10*3/mm3      nRBC 0.0 /100 WBC     Procalcitonin [[id number]]  (Abnormal) Collected: 02/24/25 1004    Specimen: Blood Updated: 02/24/25 1412     Procalcitonin 1.31 ng/mL     Narrative:      As a Marker for Sepsis (Non-Neonates):    1. <0.5 ng/mL represents a low risk of severe sepsis and/or septic shock.  2. >2 ng/mL represents a high risk of severe sepsis and/or septic shock.    As a Marker for Lower Respiratory Tract Infections that require antibiotic therapy:    PCT on Admission    Antibiotic Therapy       6-12 Hrs later    >0.5                Strongly Recommended  >0.25 - <0.5        Recommended   0.1 - 0.25          Discouraged              Remeasure/reassess PCT  <0.1                Strongly Discouraged     Remeasure/reassess PCT    As 28 day mortality risk marker: \"Change in Procalcitonin Result\" (>80% or <=80%) if Day 0 (or Day 1) and Day 4 values are available. Refer to http://[email or web address]-pct-calculator.com    Change in PCT <=80%  A decrease of PCT levels below or equal to 80% defines a positive change in PCT test result representing a higher risk for 28-day all-cause mortality of patients diagnosed with severe sepsis for septic shock.    Change in PCT >80%  A decrease of PCT levels of more than 80% defines a negative change in PCT result representing a lower risk for 28-day all-cause mortality of patients diagnosed with severe sepsis or septic shock.       " Manual Differential [[id number]]  (Abnormal) Collected: 02/23/25 1536    Specimen: Blood Updated: 02/23/25 1800     Neutrophil % 86.0 %      Lymphocyte % 7.0 %      Monocyte % 5.0 %      Basophil % 1.0 %      Myelocyte % 1.0 %      Neutrophils Absolute 14.51 10*3/mm3      Lymphocytes Absolute 1.18 10*3/mm3      Monocytes Absolute 0.84 10*3/mm3      Basophils Absolute 0.17 10*3/mm3      RBC Morphology Normal     Platelet Estimate Adequate    Respiratory Panel PCR w/COVID-19(SARS-CoV-2) HUNG/LANDEN/CHEMA/PAD/COR/DINO In-House, NP Swab in UTM/VTM, 2 HR TAT - Swab, Nasopharynx [[id number]]  (Normal) Collected: 02/23/25 1644    Specimen: Swab from Nasopharynx Updated: 02/23/25 1750     ADENOVIRUS, PCR Not Detected     Coronavirus 229E Not Detected     Coronavirus HKU1 Not Detected     Coronavirus NL63 Not Detected     Coronavirus OC43 Not Detected     COVID19 Not Detected     Human Metapneumovirus Not Detected     Human Rhinovirus/Enterovirus Not Detected     Influenza A PCR Not Detected     Influenza B PCR Not Detected     Parainfluenza Virus 1 Not Detected     Parainfluenza Virus 2 Not Detected     Parainfluenza Virus 3 Not Detected     Parainfluenza Virus 4 Not Detected     RSV, PCR Not Detected     Bordetella pertussis pcr Not Detected     Bordetella parapertussis PCR Not Detected     Chlamydophila pneumoniae PCR Not Detected     Mycoplasma pneumo by PCR Not Detected    Narrative:      In the setting of a positive respiratory panel with a viral infection PLUS a negative procalcitonin without other underlying concern for bacterial infection, consider observing off antibiotics or discontinuation of antibiotics and continue supportive care. If the respiratory panel is positive for atypical bacterial infection (Bordetella pertussis, Chlamydophila pneumoniae, or Mycoplasma pneumoniae), consider antibiotic de-escalation to target atypical bacterial infection.    Comprehensive Metabolic Panel [[id number]]  (Abnormal) Collected:  02/23/25 1536    Specimen: Blood Updated: 02/23/25 1714     Glucose 118 mg/dL      BUN 12 mg/dL      Creatinine 0.81 mg/dL      Sodium 137 mmol/L      Potassium 4.3 mmol/L      Comment: Slight hemolysis detected by analyzer. Result may be falsely elevated.        Chloride 103 mmol/L      CO2 22.9 mmol/L      Calcium 7.9 mg/dL      Total Protein 5.0 g/dL      Albumin 3.3 g/dL      ALT (SGPT) 11 U/L      AST (SGOT) 18 U/L      Alkaline Phosphatase 70 U/L      Total Bilirubin 0.4 mg/dL      Globulin 1.7 gm/dL      A/G Ratio 1.9 g/dL      BUN/Creatinine Ratio 14.8     Anion Gap 11.1 mmol/L      eGFR 72.1 mL/min/1.73     Narrative:      GFR Categories in Chronic Kidney Disease (CKD)      GFR Category          GFR (mL/min/1.73)    Interpretation  G1                     90 or greater         Normal or high (1)  G2                      60-89                Mild decrease (1)  G3a                   45-59                Mild to moderate decrease  G3b                   30-44                Moderate to severe decrease  G4                    15-29                Severe decrease  G5                    14 or less           Kidney failure          (1)In the absence of evidence of kidney disease, neither GFR category G1 or G2 fulfill the criteria for CKD.    eGFR calculation 2021 CKD-EPI creatinine equation, which does not include race as a factor    CBC & Differential [[id number]]  (Abnormal) Collected: 02/23/25 1536    Specimen: Blood Updated: 02/23/25 8747    Narrative:      The following orders were created for panel order CBC & Differential.  Procedure                               Abnormality         Status                     ---------                               -----------         ------                     CBC Auto Differential[[id number]]        Abnormal            Final result               Scan Slide[[id number]]                                                                    Please view results for these tests on the  individual orders.    CBC Auto Differential [[id number]]  (Abnormal) Collected: 02/23/25 1536    Specimen: Blood Updated: 02/23/25 1659     WBC 16.87 10*3/mm3      RBC 3.57 10*6/mm3      Hemoglobin 11.4 g/dL      Hematocrit 34.6 %      MCV 96.9 fL      MCH 31.9 pg      MCHC 32.9 g/dL      RDW 13.1 %      RDW-SD 47.0 fl      MPV 9.6 fL      Platelets 190 10*3/mm3     Blood Gas, Arterial With Co-Ox [[id number]]  (Abnormal) Collected: 02/23/25 1602    Specimen: Arterial Blood Updated: 02/23/25 1603     Site Right Radial     Samy's Test Positive     pH, Arterial 7.397 pH units      pCO2, Arterial 40.0 mm Hg      pO2, Arterial 55.8 mm Hg      Comment: 84 Value below reference range        HCO3, Arterial 24.6 mmol/L      Base Excess, Arterial -0.2 mmol/L      O2 Saturation, Arterial 91.4 %      Comment: 84 Value below reference range        Hemoglobin, Blood Gas 10.7 g/dL      Comment: 84 Value below reference range        Hematocrit, Blood Gas 32.8 %      Comment: 84 Value below reference range        Oxyhemoglobin 89.0 %      Comment: 84 Value below reference range        Methemoglobin 0.50 %      Carboxyhemoglobin 2.1 %      A-a DO2 42.1 mmHg      CO2 Content 25.8 mmol/L      Barometric Pressure for Blood Gas 728 mmHg      Modality Room Air     FIO2 21 %      Ventilator Mode NA     Collected by [id number]     Comment: Meter: [id number]     :  [id number]        pH, Temp Corrected --     pCO2, Temperature Corrected --     pO2, Temperature Corrected --          Imaging Results (Last 72 Hours)       Procedure Component Value Units Date/Time    XR Pelvis 1 or 2 View [[id number]] Collected: 02/24/25 0829     Updated: 02/24/25 0832    Narrative:      EXAM:    XR Pelvis, 1 or 2 Views     EXAM DATE:    2/23/2025 11:03 AM     CLINICAL HISTORY:    s/p left hip lexi, in pacu; S72.002A Fracture of unspecified part of  neck of left femur, initial encounter for closed fracture; S72.002A  Fracture of unspecified part of neck of  left femur, initial encounter  for closed fracture     TECHNIQUE:    Frontal view of the pelvis.     COMPARISON:    2/22/2025     FINDINGS:    BONES/JOINTS:  Left total hip arthroplasty.  Components appear well  seated.  No acute fracture.  No dislocation.    SOFT TISSUES:  Unremarkable as visualized.       Impression:        Left total hip arthroplasty. Components appear well seated.     This report was finalized on 2/24/2025 8:30 AM by Dr. Bradley Rausch MD.       XR Chest 1 View [[id number]] Collected: 02/23/25 1938     Updated: 02/1940    Narrative:      PROCEDURE: X-ray examination of the chest performed on February 23, 2025. Single view. Upright position.     HISTORY: Hypoxia.     COMPARISON: None.     FINDINGS:     Mild enlarged heart size  Coarsened bronchovascular pattern to the lungs  Mild atelectasis at the lung bases  Left lower lobe pneumonia.  No pleural effusion or pneumothorax.  Mild dextroconvex curve at the mid thoracic spine.  Azygos lobe variant in the right upper lobe  No lytic or blastic lesion.  No free air in the upper abdomen.       Impression:         Mild enlarged heart size.  Coarsened bronchovascular pattern to the lungs  Possible left lower lobe pneumonia.  Azygos lobe variant in the right upper lobe  No fracture or dislocation.     This report was finalized on 2/23/2025 7:39 PM by Eamon Omalley MD.               Physical Exam:   Physical Exam  Vitals reviewed.   Constitutional:       General: She is not in acute distress.     Comments: Patient currently sleeping, eyes closed.  NAD.  Family member at bedside.   HENT:      Head: Normocephalic.   Cardiovascular:      Pulses: Normal pulses.   Pulmonary:      Effort: Pulmonary effort is normal. No respiratory distress.   Musculoskeletal:      Comments: Dressing CDI left hip.  Thigh supple.  No sign of hematoma.+PP   Skin:     General: Skin is warm and dry.   Neurological:      Mental Status: Mental status is at baseline.    Psychiatric:         Behavior: Behavior normal.          Results Review:     I reviewed the patient's new clinical results.      Assessment & Plan     Principal Problem:    Hip fracture    Postoperative day 3 left hip hemiarthroplasty secondary to femoral neck fracture    PT/OT: WBAT with assistive device, anterior hip precautions, no active abduction.    Wound care: Maintain dressing at this time may reinforce as needed for saturation.    DVT/PPx: Lovenox 40 mg subcu daily x 14 days.    Make follow-up appointment orthopedic office in 2 weeks upon discharge.    Hospitalist attending, providing medical management.      Plan for disposition: [facility] hospitalist.     Ivette Gandhi, APRN  02/26/25  10:37 EST

## 2025-02-26 NOTE — PLAN OF CARE
Goal Outcome Evaluation:  Plan of Care Reviewed With: patient        Progress: no change  Outcome Evaluation: Pt's resting in bed, alert to self, stable on RA, has no c/o, no s/s of distress noted,  at bedside. DRS is dry/intact. Pt worked with PT, not able to ambulate. Con't with POC.

## 2025-02-26 NOTE — THERAPY TREATMENT NOTE
Acute Care - Physical Therapy Treatment Note   [place]     Patient Name: Crista Blunt  : 1940  MRN: [id number]  Today's Date: 2025   Onset of Illness/Injury or Date of Surgery: 25  Visit Dx:     ICD-10-CM ICD-9-CM   1. Closed fracture of neck of left femur, initial encounter  S72.002A 820.8   2. Closed fracture of left hip, initial encounter  S72.002A 820.8     Patient Active Problem List   Diagnosis    Hip fracture     History reviewed. No pertinent past medical history.  History reviewed. No pertinent surgical history.  PT Assessment (Last 12 Hours)       PT Evaluation and Treatment       Row Name 25 1500          Physical Therapy Time and Intention    Document Type therapy note (daily note)  -     Mode of Treatment physical therapy  -     Patient Effort fair  -     Comment Pt seen for treatment this PM, pleasant and cooperative with therapy.  Pt assisted to sit EOB, attempt 3-4 STS with grossly max/depA, some ankle pumps and encouraged other TE  -       Row Name 25 1500          Bed Mobility    Bed Mobility supine-sit;sit-supine  -     Supine-Sit [place] (Bed Mobility) dependent (less than 25% patient effort)  -     Sit-Supine [place] (Bed Mobility) dependent (less than 25% patient effort)  -     Comment, (Bed Mobility) able to sit EOB however pt had posterior lean  -       Row Name 25 1500          Transfers    Transfers sit-stand transfer;stand-sit transfer  -       Row Name 25 1500          Sit-Stand Transfer    Sit-Stand [place] (Transfers) dependent (less than 25% patient effort);2 person assist  -       Row Name 25 1500          Stand-Sit Transfer    Stand-Sit [place] (Transfers) dependent (less than 25% patient effort);2 person assist  -       Row Name 25 1500          Gait/Stairs (Locomotion)    Patient was able to Ambulate no, other medical factors prevent ambulation  -     Reason Patient was unable to  Ambulate Other (Comment);Cognitive Deficit/Severe Dementia  unsafe  -       Row Name 02/26/25 1500          Motor Skills    Therapeutic Exercise hip;ankle  attempted hip add, ankle DF/PF with multiple repetitions  -       Row Name             Wound 02/23/25 0959 Left anterior thigh    Wound - Properties Group Placement Date: 02/23/25  -JG Placement Time: 0959 -JG Side: Left  -JG Orientation: anterior  -JG Location: thigh  -JG    Retired Wound - Properties Group Placement Date: 02/23/25  -JG Placement Time: 0959  -JG Side: Left  -JG Orientation: anterior  -JG Location: thigh  -JG    Retired Wound - Properties Group Placement Date: 02/23/25  -JG Placement Time: 0959  -JG Side: Left  -JG Orientation: anterior  -JG Location: thigh  -JG    Retired Wound - Properties Group Date first assessed: 02/23/25  -JG Time first assessed: 0959  -JG Side: Left  -JG Location: thigh  -JG      Row Name 02/26/25 1500          Positioning and Restraints    Pre-Treatment Position in bed  -     Post Treatment Position bed  -     In Bed supine;with family/caregiver;exit alarm on  -               User Key  (r) = Recorded By, (t) = Taken By, (c) = Cosigned By      Initials Name Provider Type    Kristin Avilez, RN Registered Nurse    Barbara Galarza, PT Physical Therapist                    Physical Therapy Education        No education to display                  PT Recommendation and Plan     Progress Summary (PT)  Daily Progress Summary (PT): Pt seen for treatment this date, confusion limits pt participation; grossly max/depA at this time, no change to POC. Prognosis guarded, recommend SNF       Time Calculation:    PT Charges       Row Name 02/26/25 1515             Time Calculation    Start Time 1325  -KH      PT Received On 02/26/25  -         Time Calculation- PT    Total Timed Code Minutes- PT 23 minute(s)  -                User Key  (r) = Recorded By, (t) = Taken By, (c) = Cosigned By      Initials Name  Provider Type     Barbara De La Cruz, PT Physical Therapist                  Therapy Charges for Today       Code Description Service Date Service Provider Modifiers Qty    31811333763 HC PT THER PROC EA 15 MIN 2/25/2025 Barbara De La Cruz, PT GP 1    63537582331 HC PT THERAPEUTIC ACT EA 15 MIN 2/25/2025 Barbara De La Cruz, PT GP 1    01951631414 HC PT THER PROC EA 15 MIN 2/26/2025 Barbara De La Cruz, PT GP 1    76833599024 HC PT THERAPEUTIC ACT EA 15 MIN 2/26/2025 Barbara De La Cruz, PT GP 1            PT G-Codes  AM-PAC 6 Clicks Score (PT): 6    Barbara De La Cruz, PT  2/26/2025

## 2025-02-26 NOTE — PROGRESS NOTES
[facility]  PROGRESS NOTE     Patient Identification:  Name:  Crista Blunt  Age:  84 y.o.  Sex:  female  :  1940  MRN:  [id number]  Visit Number:  [id number]  ROOM: [address] [place] [address]     Primary Care Provider:  Abby Mckenzie DO    Length of stay in inpatient status:  3    Subjective     Chief Compliant: Status post left hip fracture with repair  Chief Complaint   Patient presents with    Fall       History of Presenting Illness: 84-year-old female with advanced dementia who is status post left hip fracture with repair.  Patient has been afebrile since 5 AM yesterday morning.  Patient's urine culture was positive for E. coli and it is sensitive to current regimen.  Patient with no other complaints at this time.   is at bedside and had long discussion with him pertaining to rehab efforts.    Objective     Current Hospital Meds:azithromycin, 250 mg, Oral, Daily  cefdinir, 300 mg, Oral, Q12H  enoxaparin, 40 mg, Subcutaneous, Q24H  ropivacaine 0.5 % 250 mg, Morphine 10 mg, cloNIDine 80 mcg, ketorolac 30 mg, EPINEPHrine 1 mg/mL 0.5 mg in sodium chloride 0.9 % 50 mL solution, , Intra-articular, Once  sodium chloride, 10 mL, Intravenous, Q12H       ----------------------------------------------------------------------------------------------------------------------  Vital Signs:  Temp:  [98.5 °F (36.9 °C)-98.9 °F (37.2 °C)] 98.5 °F (36.9 °C)  Heart Rate:  [] 93  Resp:  [18] 18  BP: (117-131)/(54-64) 131/64     on   ;   Device (Oxygen Therapy): room air  Body mass index is 22.82 kg/m².    Wt Readings from Last 3 Encounters:   25 62.2 kg (137 lb 2 oz)   24 59 kg (130 lb)     Intake & Output (last 3 days)             P.O. 120  480     I.V. (mL/kg) 1102.9 (17.7)  0 (0)     Total Intake(mL/kg) 1222.9 (19.7)  480 (7.7)     Urine (mL/kg/hr) 500 (0.3) 350 (0.2) 1200 (0.8)     Total  Output      Net +722.9 -350 -720             Urine Unmeasured Occurrence 1 x             Diet: Regular/House; Fluid Consistency: Thin (IDDSI 0)  ----------------------------------------------------------------------------------------------------------------------  Physical exam:  Constitutional: No acute distress  HEENT: Cephalic atraumatic  Neck:   Supple  Cardiovascular: Regular rate and rhythm  Pulmonary/Chest: Clear to auscultation  Abdominal: Positive bowel sounds soft.   Musculoskeletal: Status post left hip repair  Neurological: Patient disoriented secondary to dementia  Skin: No rash  Peripheral vascular: No edema  Genitourinary:  ----------------------------------------------------------------------------------------------------------------------    Last echocardiogram:    ----------------------------------------------------------------------------------------------------------------------  Results from last 7 days   Lab Units 02/25/25  0239 02/23/25  1536 02/23/25  0508   WBC 10*3/mm3 12.69* 16.87* 14.15*   HEMOGLOBIN g/dL 10.7* 11.4* 13.8   HEMATOCRIT % 33.4* 34.6 42.5   MCV fL 97.9* 96.9 96.4   MCHC g/dL 32.0 32.9 32.5   PLATELETS 10*3/mm3 203 190 247   INR   --   --  0.97     Results from last 7 days   Lab Units 02/23/25  1602   PH, ARTERIAL pH units 7.397   PO2 ART mm Hg 55.8*   PCO2, ARTERIAL mm Hg 40.0   HCO3 ART mmol/L 24.6     Results from last 7 days   Lab Units 02/25/25  0752 02/25/25  0239 02/23/25  1536 02/23/25  0508 02/23/25  0155   SODIUM mmol/L  --  136 137 137 136   POTASSIUM mmol/L  --  3.7 4.3 3.9 3.8   MAGNESIUM mg/dL  --   --   --   --  2.3   CHLORIDE mmol/L  --  105 103 102 101   CO2 mmol/L  --  23.2 22.9 25.5 23.9   BUN mg/dL  --  12 12 12 14   CREATININE mg/dL  --  0.78 0.81 0.77 0.81   CALCIUM mg/dL  --  8.0* 7.9* 8.7 9.1   IONIZED CALCIUM mmol/L 1.08*  --   --   --   --    GLUCOSE mg/dL  --  133* 118* 147* 171*   ALBUMIN g/dL  --   --  3.3* 4.1 4.1   BILIRUBIN mg/dL   "--   --  0.4 0.4 0.4   ALK PHOS U/L  --   --  70 93 100   AST (SGOT) U/L  --   --  18 17 16   ALT (SGPT) U/L  --   --  11 12 12   Estimated Creatinine Clearance: 53.7 mL/min (by C-G formula based on SCr of 0.78 mg/dL).  No results found for: \"AMMONIA\"              No results found for: \"HGBA1C\", \"POCGLU\"  Lab Results   Component Value Date    TSH 1.800 02/23/2025     No results found for: \"PREGTESTUR\", \"PREGSERUM\", \"HCG\", \"HCGQUANT\"  Pain Management Panel           No data to display              Brief Urine Lab Results  (Last result in the past 365 days)        Color   Clarity   Blood   Leuk Est   Nitrite   Protein   CREAT   Urine HCG        02/23/25 0537 Yellow   Clear   Negative   Small (1+)   Positive   Negative                 Blood Culture   Date Value Ref Range Status   02/24/2025 No growth at 24 hours  Preliminary   02/24/2025 No growth at 24 hours  Preliminary     Results from last 7 days   Lab Units 02/23/25  0537   NITRITE UA  Positive*   WBC UA /HPF 6-10*   BACTERIA UA /HPF 4+*   SQUAM EPITHEL UA /HPF 0-2   URINECX  >100,000 CFU/mL Escherichia coli*     Urine Culture   Date Value Ref Range Status   02/23/2025 >100,000 CFU/mL Escherichia coli (A)  Final     No results found for: \"WOUNDCX\"  No results found for: \"STOOLCX\"  Results from last 7 days   Lab Units 02/24/25  1004   PROCALCITONIN ng/mL 1.31*       I have personally looked at the labs and they are summarized above.  ----------------------------------------------------------------------------------------------------------------------  Detailed radiology reports for the last 24 hours:    Imaging Results (Last 24 Hours)       ** No results found for the last 24 hours. **          Final impressions for the last 30 days of radiology reports:    XR Pelvis 1 or 2 View    Result Date: 2/24/2025    Left total hip arthroplasty. Components appear well seated.  This report was finalized on 2/24/2025 8:30 AM by Dr. Bradley Rausch MD.      XR Chest 1 " View    Result Date: 2/23/2025   Mild enlarged heart size. Coarsened bronchovascular pattern to the lungs Possible left lower lobe pneumonia. Azygos lobe variant in the right upper lobe No fracture or dislocation.  This report was finalized on 2/23/2025 7:39 PM by Eamon Omalley MD.      XR Chest 1 View    Result Date: 2/23/2025   Normal heart size Hypoinflated lungs Coarsened bronchovascular pattern to the lungs. Left lower lobe atelectasis versus pneumonia. No pleural effusion or pneumothorax No fracture or foreign body.  This report was finalized on 2/23/2025 5:27 AM by Eamon Omalley MD.      XR Tibia Fibula 2 View Left    Result Date: 2/23/2025   Intact left tibia and fibula with no acute fracture or dislocation. 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 Mild osteoarthritis at the left tibiotalar joint. No foreign body.    This report was finalized on 2/23/2025 12:20 AM by Eamon Omalley MD.      XR Femur 2 View Left    Result Date: 2/23/2025   Acute left femoral neck fracture. No acute dislocation. Osteoarthritis.  This report was finalized on 2/23/2025 12:19 AM by Eamon Omalley MD.      XR Hip With or Without Pelvis 2 - 3 View Left    Result Date: 2/23/2025  Impression:  Acute left femoral neck fracture. No acute dislocation of the left femoral head. Mild osteoarthritis at the right and left hip joint. Intact pelvis No acute foreign body.  This report was finalized on 2/23/2025 12:18 AM by Eamon Omalley MD.      CT Cervical Spine Without Contrast    Result Date: 2/23/2025   No acute fracture or dislocation. No apical pneumothorax.  This report was finalized on 2/23/2025 12:16 AM by Eamon Omalley MD.      CT Head Without Contrast    Result Date: 2/23/2025  Impression:  Moderate generalized brain volume loss with mild to moderate chronic small vessel ischemic type changes in the white matter. Ex-vacuo dilatation of the ventricular system due to underlying deep white matter parenchymal volume loss No acute  intracranial hemorrhage, mass, infarct, or edema. Minimal mucosal thickening in the ethmoid air cells Partial opacification of the left mastoid air cells. No fracture or dislocation. No scalp hematoma.  This report was finalized on 2/23/2025 12:15 AM by aEmon Omalley MD.     I have personally looked at the radiology images and read the final radiology report.    Assessment & Plan    Status post left hip fracture requiring ORIF-- will be working to help patient find rehab options.  Suspect the patient will require subacute rehab.    UTI secondary to E. coli will DC Rocephin and placed on Omnicef at this time.    Questionable left lower lobe pneumonia patient clinically is much improved will complete Zithromax and Omnicef course    Alzheimer's type dementia supportive care    Hypertension controlled    GERD continue PPI    VTE Prophylaxis:   Lovenox        Deandre Hernandez MD  [facility] Hospitalist  02/26/25  10:30 EST

## 2025-02-26 NOTE — DISCHARGE PLACEMENT REQUEST
"Crista Connors (84 y.o. Female)       Date of Birth   1940    Social Security Number       Address   [address] [place] [id number]    Home Phone   [phone]    MRN   [id number]       [facility]    Marital Status                               Admission Date   25    Admission Type   Emergency    Admitting Provider   Heriberto Arteaga MD    Attending Provider   Deandre Hernandez MD    Department, Room/Bed   [address], [address]339/       Discharge Date       Discharge Disposition       Discharge Destination                                 Attending Provider: Deandre Hernandez MD    Allergies: Sulfa Antibiotics    Isolation: None   Infection: None   Code Status: CPR    Ht: 165.1 cm (65\")   Wt: 62.2 kg (137 lb 2 oz)    Admission Cmt: None   Principal Problem: Hip fracture [S72.009A]                   Active Insurance as of 2025       Primary Coverage       Payor Plan Insurance Group Employer/Plan Group    HUMANA MEDICARE REPLACEMENT HU[employer]NA MEDICARE ADVANTAGE GROUP [id number]       Payor Plan Address Payor Plan Phone Number Payor Plan Fax Number Effective Dates    [address] [address] [phone]  2019 - None Entered    [facility] [id number]         Subscriber Name Subscriber Birth Date Member ID       CRISTA CONNORS 1940 [id number]                     Emergency Contacts        (Rel.) Home Phone Work Phone Mobile Phone    Donavon Connors (Spouse) [phone] -- --                 History & Physical        Aysha Cardoza APRN at 25 0306       Attestation signed by Heriberto Arteaga MD at 25 0504    I have reviewed this documentation and agree.  I have discussed and formulated the assessment and plan with ZABRINA Olmstead.                      [facility] Medicine Services  History & Physical    Patient Identification:  Name:  Crista Connors  Age:  84 y.o.  Sex:  female  :  1940  MRN:  [id number]   Visit Number:  [id number]  Admit " Date: 2/22/2025   Primary Care Physician:  Abby Mckenzie DO    Subjective     Chief complaint: Fall    History of presenting illness:      Crista Blunt is a 84 y.o. female with past medical history significant for severe Alzheimer's dementia, hypercholesterolemia, hypertension, GERD, osteoarthritis, depression, anxiety, and advanced age.  Patient transported to [facility] emergency department for further evaluation of left hip pain after experiencing a mechanical fall at home.  Patient does have severe dementia and was unable to provide details for HPI.  However, spouse was at bedside.  He states that he was helping patient walk to the living room from the kitchen when the patient went into a direction he was not expecting, causing patient to fall and hit her head and the left hip.  He states that he and family member had a very difficult time getting the patient up out of the floor.  He states that patient typically does have a hard time ambulating, although usually she is a x 1 assist. He states that patient has not taken any home medications in approximately 2 years. Spouse states that patient has a poor appetite and usually only eats 1 meal per day.  During my evaluation, patient was awake, calm, cooperative, and appearing in no acute distress.  She denied any pain at the time of my exam.  She was able to state her name.  She was unable to answer any other pertinent questions or orientation questions.  The left lower extremity appears neurovascularly intact.  Left lower extremity appears shortened and externally rotated.  X-ray of the left hip obtained in the ED revealed acute left femoral neck fracture without dislocation. ED provider discussed with orthopedic surgery who accepted patient in consult.  Patient will be admitted to the [facility] floor for further monitoring, evaluation, and treatment.  Discussed plans with patient's spouse who voices agreement and understanding with no  further questions or concerns at this time.    Upon arrival to the ED, vital signs were temperature 98, pulse 90, respirations 18, blood pressure 167/89, SpO2 saturation 92% on room air.  CMP with glucose 171, otherwise unremarkable.  CBC with WBCs 16.27, otherwise unremarkable.  Chest x-ray pending.  X-ray of the left hip noted acute left femoral neck fracture.  No acute dislocation of the left femoral head.  Mild osteoarthritis at the right and left hip joint.  Intact pelvis.  No acute foreign body.  X-ray of the left tib-fib unremarkable.  X-ray of the left femur noted acute left femoral neck fracture.  No acute dislocation.  CT of the head without contrast noted moderate generalized brain volume loss with mild to moderate chronic small vessel ischemic type changes in the white matter.  Ex vacuo dilatation of the ventricular system due to underlying deep white matter parenchymal volume loss.  No acute intracranial hemorrhage, mass, infarct, or edema.  No scalp hematoma.  CT of the cervical spine without contrast unremarkable.  No acute fracture or dislocation.  No apical pneumothorax.    Known Emergency Department medications received prior to my evaluation included N/A (no medications given while in ED). Emergency Department Room location at the time of my evaluation was \[facility]\"". Discussed with admitting physician, Heriberto Thornton MD.      ---------------------------------------------------------------------------------------------------------------------   Review of Systems   Unable to perform ROS: Dementia     ---------------------------------------------------------------------------------------------------------------------   History reviewed. No pertinent past medical history.  History reviewed. No pertinent surgical history.  History reviewed. No pertinent family history.  Social History     Socioeconomic History    Marital status:       ---------------------------------------------------------------------------------------------------------------------   Allergies:  Sulfa antibiotics  ---------------------------------------------------------------------------------------------------------------------   Home medications:    Medications below are reported home medications pulling from within the system; at this time, these medications have not been reconciled unless otherwise specified and are in the verification process for further verifcation as current home medications.  (Not in a hospital admission)      Hospital Scheduled Meds:  Morphine, 2 mg, Intravenous, Once  ondansetron, 4 mg, Intravenous, Once           Current listed hospital scheduled medications may not yet reflect those currently placed in orders that are signed and held awaiting patient's arrival to floor.   ---------------------------------------------------------------------------------------------------------------------     Objective     Vital Signs:  Temp:  [98 °F (36.7 °C)] 98 °F (36.7 °C)  Heart Rate:  [] 100  Resp:  [18] 18  BP: (140-167)/(87-89) 140/87      02/22/25 2023   Weight: 59 kg (130 lb 1.1 oz)     Body mass index is 21.64 kg/m².  ---------------------------------------------------------------------------------------------------------------------       Physical Exam  Vitals reviewed.   Constitutional:       General: She is awake. She is not in acute distress.     Appearance: She is ill-appearing (chronically). She is not diaphoretic.   HENT:      Head: Normocephalic and atraumatic.      Mouth/Throat:      Mouth: Mucous membranes are dry.   Eyes:      Extraocular Movements: Extraocular movements intact.      Pupils: Pupils are equal, round, and reactive to light.   Cardiovascular:      Rate and Rhythm: Normal rate and regular rhythm.      Pulses: Normal pulses.           Dorsalis pedis pulses are 2+ on the right side and 2+ on the left side.      Heart  sounds: Normal heart sounds. No murmur heard.     No friction rub.   Pulmonary:      Effort: Pulmonary effort is normal. No accessory muscle usage, respiratory distress or retractions.      Breath sounds: No wheezing, rhonchi or rales.   Abdominal:      General: Abdomen is flat. Bowel sounds are normal. There is no distension.      Palpations: Abdomen is soft.      Tenderness: There is no abdominal tenderness. There is no guarding.   Musculoskeletal:      Cervical back: Neck supple. No rigidity.      Right lower leg: No edema.      Left lower leg: No edema.      Comments: LLE appears shortened and externally rotated. Appearing neurovascularly intact upon my exam.    Skin:     General: Skin is warm and dry.      Capillary Refill: Capillary refill takes 2 to 3 seconds.      Coloration: Skin is pale.   Neurological:      Mental Status: She is alert. Mental status is at baseline. She is disoriented.      Sensory: Sensation is intact.      Motor: Weakness (generalized) present. No tremor.   Psychiatric:         Attention and Perception: She is inattentive.         Mood and Affect: Mood is not anxious.         Behavior: Behavior is not agitated, aggressive or combative. Behavior is cooperative.         Cognition and Memory: Cognition is impaired. Memory is impaired.       ---------------------------------------------------------------------------------------------------------------------  EKG: Performed in ED during my evaluation. Revealed normal sinus rhythm/sinus tachycardia 100 without evidence of acute ischemia. Image upload pending.     Telemetry:  Patient not on continuous cardiac monitoring in [facility] Bed 101 in ED.   ---------------------------------------------------------------------------------------------------------------------   Results from last 7 days   Lab Units 02/23/25  0155   WBC 10*3/mm3 16.27*   HEMOGLOBIN g/dL 14.2   HEMATOCRIT % 43.7   MCV fL 95.2   MCHC g/dL 32.5   PLATELETS 10*3/mm3 258        "  Results from last 7 days   Lab Units 02/23/25  0155   SODIUM mmol/L 136   POTASSIUM mmol/L 3.8   CHLORIDE mmol/L 101   CO2 mmol/L 23.9   BUN mg/dL 14   CREATININE mg/dL 0.81   CALCIUM mg/dL 9.1   GLUCOSE mg/dL 171*   ALBUMIN g/dL 4.1   BILIRUBIN mg/dL 0.4   ALK PHOS U/L 100   AST (SGOT) U/L 16   ALT (SGPT) U/L 12   Estimated Creatinine Clearance: 49 mL/min (by C-G formula based on SCr of 0.81 mg/dL).  No results found for: \"AMMONIA\"          No results found for: \"HGBA1C\"  Lab Results   Component Value Date    TSH 2.420 04/04/2024     No results found for: \"PREGTESTUR\", \"PREGSERUM\", \"HCG\", \"HCGQUANT\"  Pain Management Panel           No data to display                  ---------------------------------------------------------------------------------------------------------------------  Imaging Results (Last 7 Days)       Procedure Component Value Units Date/Time    XR Chest 1 View [[id number]] Resulted: 02/23/25 0204     Updated: 02/23/25 0223    XR Tibia Fibula 2 View Left [[id number]] Collected: 02/23/25 0019     Updated: 02/23/25 0022    Narrative:      PROCEDURE: X-ray examination of the left tibia and fibula performed on  February 22, 2025. 3 views.     HISTORY: Fall. Leg pain.     COMPARISON: None.     FINDINGS:     Intact left tibia and fibula with no acute fracture or dislocation.  No lytic or blastic lesion. No foreign body.  No tibiotalar joint effusion.  No soft tissue swelling.       Impression:         Intact left tibia and fibula with no acute fracture or dislocation.  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  Mild osteoarthritis at the left tibiotalar joint.  No foreign body.           This report was finalized on 2/23/2025 12:20 AM by Eamon Omalley MD.       XR Femur 2 View Left [[id number]] Collected: 02/23/25 0018     Updated: 02/23/25 0021    Narrative:      PROCEDURE: X-ray examination of the left femur performed on February 22, 2025. 4 films.     HISTORY: Left leg pain. Hip pain. Fall.   "   COMPARISON: None.     FINDINGS:     Acute left femoral neck fracture.  No acute dislocation of the left femoral head.  Mild osteoarthritis at the left hip joint  Mild osteoarthritis at the left knee.  No acute foreign body.       Impression:         Acute left femoral neck fracture.  No acute dislocation.  Osteoarthritis.     This report was finalized on 2/23/2025 12:19 AM by Eamon Omalley MD.       XR Hip With or Without Pelvis 2 - 3 View Left [[id number]] Collected: 02/23/25 0016     Updated: 02/23/25 0020    Narrative:      PROCEDURE: X-ray examination of the pelvis and left hip performed on  February 22, 2025. 3 views.     HISTORY: Fall. Pain.     COMPARISON: None.     FINDINGS:     Intact pelvis  Intact SI joints and intact pubic symphysis.  Mild osteoarthritis at the right and left hip joint.  Degenerative disc disease in the lower lumbar spine.  Acute left femoral neck fracture.   No acute dislocation of the left femoral head.       Impression:      Impression:     Acute left femoral neck fracture.   No acute dislocation of the left femoral head.  Mild osteoarthritis at the right and left hip joint.  Intact pelvis  No acute foreign body.     This report was finalized on 2/23/2025 12:18 AM by Eamon Omalley MD.       CT Cervical Spine Without Contrast [[id number]] Collected: 02/23/25 0015     Updated: 02/23/25 0018    Narrative:      PROCEDURE: CT of the cervical spine performed without IV contrast on  February 22, 2025. The examination was performed with 2 mm axial imaging  and 2 mm sagittal coronal reconstruction images. Examination was  performed according to as low as reasonably achievable dose protocol.     HISTORY: Fall. Head injury. Neck injury.     COMPARISON: None.     FINDINGS:     Anatomic alignment of the cervical vertebral bodies with no acute  fracture or dislocation.  Normal thickness of the prevertebral soft tissues  No lytic or blastic lesion.       Impression:         No acute fracture or  dislocation.   No apical pneumothorax.     This report was finalized on 2/23/2025 12:16 AM by Eamon Omalley MD.       CT Head Without Contrast [[id number]] Collected: 02/23/25 0012     Updated: 02/23/25 0017    Narrative:      PROCEDURE: CT brain performed without IV contrast on February 22, 2025.  The examination was performed with 3 mm axial imaging and 3 mm sagittal  and coronal reconstruction images. The examination was performed  according to as low as reasonably achievable dose protocol.     HISTORY: Fall. Head injury. Neck injury.     COMPARISON: None.     FINDINGS:     Moderate generalized brain volume loss  Mild to moderate chronic small vessel ischemic type changes in the white  matter.  Deep white matter parenchymal volume loss with associated ex vacuo  dilatation of the lateral ventricles and basal cisterns.  No acute intracranial hemorrhage, mass, infarct, or edema.  Mild mucosal thickening in the ethmoid air cells.  High jugular bulb noted on the right side.  Debris noted within the right and left external auditory canal, right  greater than left.  Left side mastoiditis  Minimal mucosal thickening in the ethmoid air cells  No fracture.  No scalp hematoma.       Impression:      Impression:     Moderate generalized brain volume loss with mild to moderate chronic  small vessel ischemic type changes in the white matter.  Ex-vacuo dilatation of the ventricular system due to underlying deep  white matter parenchymal volume loss  No acute intracranial hemorrhage, mass, infarct, or edema.  Minimal mucosal thickening in the ethmoid air cells  Partial opacification of the left mastoid air cells.  No fracture or dislocation.  No scalp hematoma.     This report was finalized on 2/23/2025 12:15 AM by Eamon Omalley MD.               Last echocardiogram:  No previous echo on file.     I have personally reviewed the above radiology images and read the final radiology report on  02/23/25  ---------------------------------------------------------------------------------------------------------------------  Assessment / Plan     Active Hospital Problems    Diagnosis  POA    **Hip fracture [S72.009A]  Yes       ASSESSMENT/PLAN:  #Acute non-displaced left femoral neck fracture s/p mechanical fall at home prior to arrival   #Leukocytosis (POA), likely reactive   #Severe Alzheimer's dementia  #Hypercholesterolemia  #Hypertension  #GERD  #Osteoarthritis  #Depression and anxiety  #Advanced age  -Radiological images reviewed.   -Orthopedic surgery consulted, input/assistance is much appreciated.   -PRN IV/PO pain medication available with holding parameters to prevent hypotension, oversedation, and/or respiratory depression.   -Continuous pulse oximetry ordered.   -Neurovascular checks Q 4 hours.   -NPO pending surgical evaluation/intervention.  -Strict Bedrest.   -Fall precautions.   -May utilize external urinary catheter.   -No home medications to review as patient reportedly has been off all medications for the past 2 years. Spouse states that he does occasionally give her a THC gummy.   -Provide reorientation/redirection as necessary.   -Utilize bed alarm.  -PT/OT consults for postoperative evaluation.   -Case management consulted for assistance with discharge plans.   -VS currently stable; continue to closely monitor VS per hospital policy.   -Repeat labs in the AM (CBC and CMP). Obtain PT-INR.   -Preop EKG ordered with no concern for acute ischemia; revealed NSR/.  -Preop COVID-19 and Flu swab ordered, pending.      ----------  -DVT prophylaxis: SCD (Right leg ONLY).   -Activity: Bedrest.   -Expected length of stay:   INPATIENT status due to the need for care which can only be reasonably provided in an hospital setting such as aggressive/expedited ancillary services and/or consultation services, the necessity for IV medications, close physician monitoring and/or the possible need for  procedures.  In such, I feel patient's risk for adverse outcomes and need for care warrant INPATIENT evaluation and predict the patient's care encounter to likely last beyond 2 midnights.   -Disposition pending clinical course.     High risk secondary to acute non-displaced left femoral neck fracture status post mechanical fall at home prior to arrival, severe Alzheimer's dementia, and advanced age.     CODE STATUS: DNR/DNI, discussed with spouse at bedside in ED (H101).       [place] Nani, APRN   02/23/25  03:27 EST  Pager #[phone]  ---------------------------------------------------------------------------------------------------------------------        Electronically signed by Heriberto Arteaga MD at 02/23/25 0504       Vital Signs (last day)       Date/Time Temp Temp src Pulse Resp BP Patient Position SpO2    02/26/25 1000 99 (37.2) Oral -- 18 113/58 Lying --    02/26/25 0600 98.5 (36.9) Oral -- 18 131/64 Lying --    02/26/25 0300 98.9 (37.2) Oral 93 18 130/56 Lying --    02/25/25 2250 98.8 (37.1) Oral 96 18 124/54 Lying --    02/25/25 1900 98.6 (37) Oral 104 18 122/58 Lying --    02/25/25 1446 98.6 (37) Oral -- 18 117/58 Lying --    02/25/25 0649 97.8 (36.6) Oral -- 18 109/55 Lying --    02/25/25 0500 100.2 (37.9) Axillary -- -- -- -- --    02/25/25 0402 100.3 (37.9) Axillary -- -- -- -- --    02/25/25 0300 -- -- -- 18 122/62 Lying --          Lines, Drains & Airways       Active LDAs       Name Placement date Placement time Site Days    Peripheral IV 02/23/25 0350 Anterior;Left;Proximal Forearm 02/23/25  0350  Forearm  3                  Current Facility-Administered Medications   Medication Dose Route Frequency Provider Last Rate Last Admin    acetaminophen (TYLENOL) tablet 650 mg  650 mg Oral Q4H PRN Perry Cobb MD        Or    acetaminophen (TYLENOL) 160 MG/5ML oral solution 650 mg  650 mg Oral Q4H PRN Perry Cobb MD   650 mg at 02/25/25 0351    Or    acetaminophen (TYLENOL) suppository 650 mg   650 mg Rectal Q4H PRN Perry Cobb MD   650 mg at 02/23/25 1338    azithromycin (ZITHROMAX) tablet 250 mg  250 mg Oral Daily Deandre Hernandez MD   250 mg at 02/26/25 0857    sennosides-docusate (PERICOLACE) 8.6-50 MG per tablet 2 tablet  2 tablet Oral BID PRN Perry Cobb MD        And    polyethylene glycol (MIRALAX) packet 17 g  17 g Oral Daily PRN Perry Cobb MD        And    bisacodyl (DULCOLAX) EC tablet 5 mg  5 mg Oral Daily PRN Perry Cobb MD        And    bisacodyl (DULCOLAX) suppository 10 mg  10 mg Rectal Daily PRN Perry Cobb MD        Calcium Replacement - Follow Nurse / BPA Driven Protocol   Not Applicable Deandre Szymanski MD        cefdinir (OMNICEF) capsule 300 mg  300 mg Oral Q12H Deandre Hernandez MD   300 mg at 02/26/25 1210    Enoxaparin Sodium (LOVENOX) syringe 40 mg  40 mg Subcutaneous Q24H Perry Cobb MD   40 mg at 02/26/25 0857    HYDROcodone-acetaminophen (NORCO) 5-325 MG per tablet 1 tablet  1 tablet Oral Q6H PRDeandre Clancy MD        Magnesium Standard Dose Replacement - Follow Nurse / BPA Driven Protocol   Not Applicable Deandre Szymanski MD        Phosphorus Replacement - Follow Nurse / BPA Driven Protocol   Not Applicable Deandre Szymanski MD        Potassium Replacement - Follow Nurse / BPA Driven Protocol   Not Applicable Deandre Szymanski MD        ropivacaine 0.5 % 250 mg, Morphine 10 mg, cloNIDine 80 mcg, ketorolac 30 mg, EPINEPHrine 1 mg/mL 0.5 mg in sodium chloride 0.9 % 50 mL solution   Intra-articular Once Perry Cobb MD        sodium chloride 0.9 % flush 10 mL  10 mL Intravenous PRN Perry Cobb MD        sodium chloride 0.9 % flush 10 mL  10 mL Intravenous Q12H Perry Cobb MD   10 mL at 02/26/25 0857    sodium chloride 0.9 % flush 10 mL  10 mL Intravenous PRN Perry Cobb MD        sodium chloride 0.9 % infusion 40 mL  40 mL Intravenous PRN Perry Cobb MD            Operative/Procedure Notes (most recent note)        Perry Cobb MD at 02/23/25 0934           Crista Blunt  2/23/2025      Operative Note:    Surgeon: Perry Cobb MD    Assistant: MODESTA Ng    Preoperative Diagnosis:   Left femoral neck fracture, displaced    Postoperative Diagnosis:   Same    Procedure(s):    1.  Left hip hemiarthroplasty for fracture, CPT code 85294    Anesthesia: General, local injection    Estimated Blood Loss: 100 cc    Specimen Obtained:  None    Complication(s):  None apparent.     Implants:  Depuy Epyphyses stem 3, standard offsett, 0 neck, 47 endo head     Operative Indication:     The patient is a 84-year-old who fell from standing height sustained the above injury.  Due to her ambulatory status and acute nature of her injury she elected to proceed with the above operation.  Risk benefits alternatives and complications was discussed with the patient prior to surgery.  This was also discussed with her family.    Operative Details:    The patient was met in the preoperative suite where the correct operative site was marked. The patient was brought to the operating room where anesthesia was initiated. The patient was positioned appropriately with all bony prominences well-padded. The patient was prepped and draped in the usual sterile fashion and prior to incision a time out was observed to verify the correct operative site, procedure and antibiotics.    A longitudinal incision was taken on the lateral aspect of the proximal femur. This was taken down to the IT band. Hemostasis was achieved with electrocautery. The IT band was incised in line with the incision. The abductors were then released on the anterior two-thirds and tagged for later repair. Hohmann retractors were placed around the capsule and an H capsulotomy was performed. The leg was then externally rotated and the foot was placed on the side of the table.     An oscillating saw was utilized to create a femoral neck osteotomy and the bone was removed. Attention was brought back to the acetabulum where a corkscrew was  used to remove the femoral head. The femoral head was then sized.    Attention was brought back to the proximal femur where sequential broaching was performed until an appropriate fit. A trial implant was then placed. No subluxation was noted with passive range of motion and a minimal shuck was noted. Limb lengths appeared equal. Trial implants were removed.     The canal was then prepped and irrigated. The stem was cemented into position and the final implant impacted into position. The final implants were reduced which showed a similar exam compared to the trial implants.     The wound was irrigated thoroughly with saline. The capsule was repaired along with the abductors and IT band.The remainder of the wound was closed in standard layered fashion and a soft dressing was applied. The patient was extubated, transferred to the hospital bed and into PACU in stable condition. The patient tolerated the procedure well without any complications.     Postoperative Plan:    Transfer to floor  Dressing- maintain dressing for now, ok to reinforce prn saturation  Weightbearing/Lifting Status- as tolerated, anterior hip precautions, no active abduction   DVT PPx-Lovenox 40 once daily for 14 days  Follow up-2 weeks to the office at discharge    Electronically Signed by: Perry Cobb MD              Electronically signed by Perry Cobb MD at 02/23/25 1058          Physician Progress Notes (most recent note)        Ivette Gandhi, APRN at 02/26/25 1036               LOS: 3 days     Chief Complaint: Left hip fracture    Subjective     Interval History:   Postoperative day 3 left hip hemiarthroplasty.  Patient asleep in bed, eyes closed.  NAD.  Spouse at bedside.  Providing information.  No acute events reported overnight.  Patient was able to sit on edge of bed yesterday and stand with physical therapy assistance.  Patient currently on antibiotics secondary to UTI.      Objective     Vital Signs  Temp:  [98.5 °F (36.9  °C)-98.9 °F (37.2 °C)] 98.5 °F (36.9 °C)  Heart Rate:  [] 93  Resp:  [18] 18  BP: (117-131)/(54-64) 131/64    Labs & Rads  Lab Results (last 72 hours)       Procedure Component Value Units Date/Time    Blood Culture - Blood, Arm, Left [[id number]]  (Normal) Collected: 02/24/25 1441    Specimen: Blood from Arm, Left Updated: 02/25/25 1501     Blood Culture No growth at 24 hours    Blood Culture - Blood, Arm, Left [[id number]]  (Normal) Collected: 02/24/25 1403    Specimen: Blood from Arm, Left Updated: 02/25/25 1415     Blood Culture No growth at 24 hours    Urine Culture - Urine, Urine, Clean Catch [[id number]]  (Abnormal)  (Susceptibility) Collected: 02/23/25 0537    Specimen: Urine, Clean Catch Updated: 02/25/25 1035     Urine Culture >100,000 CFU/mL Escherichia coli    Narrative:      Colonization of the urinary tract without infection is common. Treatment is discouraged unless the patient is symptomatic, pregnant, or undergoing an invasive urologic procedure.    Susceptibility        Escherichia coli      JANICE      Amoxicillin + Clavulanate Susceptible      Ampicillin Susceptible      Ampicillin + Sulbactam Susceptible      Cefazolin (Urine) Susceptible      Cefepime Susceptible      Ceftazidime Susceptible      Ceftriaxone Susceptible      Gentamicin Susceptible      Levofloxacin Susceptible      Nitrofurantoin Susceptible      Piperacillin + Tazobactam Susceptible      Trimethoprim + Sulfamethoxazole Susceptible                           Calcium, Ionized [[id number]]  (Abnormal) Collected: 02/25/25 0752    Specimen: Blood Updated: 02/25/25 0813     Ionized Calcium 1.08 mmol/L     Basic Metabolic Panel [[id number]]  (Abnormal) Collected: 02/25/25 0239    Specimen: Blood Updated: 02/25/25 0348     Glucose 133 mg/dL      BUN 12 mg/dL      Creatinine 0.78 mg/dL      Sodium 136 mmol/L      Potassium 3.7 mmol/L      Chloride 105 mmol/L      CO2 23.2 mmol/L      Calcium 8.0 mg/dL      BUN/Creatinine Ratio 15.4      Anion Gap 7.8 mmol/L      eGFR 75.5 mL/min/1.73     Narrative:      GFR Categories in Chronic Kidney Disease (CKD)      GFR Category          GFR (mL/min/1.73)    Interpretation  G1                     90 or greater         Normal or high (1)  G2                      60-89                Mild decrease (1)  G3a                   45-59                Mild to moderate decrease  G3b                   30-44                Moderate to severe decrease  G4                    15-29                Severe decrease  G5                    14 or less           Kidney failure          (1)In the absence of evidence of kidney disease, neither GFR category G1 or G2 fulfill the criteria for CKD.    eGFR calculation 2021 CKD-EPI creatinine equation, which does not include race as a factor    CBC & Differential [[id number]]  (Abnormal) Collected: 02/25/25 0239    Specimen: Blood Updated: 02/25/25 0318    Narrative:      The following orders were created for panel order CBC & Differential.  Procedure                               Abnormality         Status                     ---------                               -----------         ------                     CBC Auto Differential[[id number]]        Abnormal            Final result                 Please view results for these tests on the individual orders.    CBC Auto Differential [[id number]]  (Abnormal) Collected: 02/25/25 0239    Specimen: Blood Updated: 02/25/25 0318     WBC 12.69 10*3/mm3      RBC 3.41 10*6/mm3      Hemoglobin 10.7 g/dL      Hematocrit 33.4 %      MCV 97.9 fL      MCH 31.4 pg      MCHC 32.0 g/dL      RDW 12.9 %      RDW-SD 46.0 fl      MPV 9.0 fL      Platelets 203 10*3/mm3      Neutrophil % 81.8 %      Lymphocyte % 10.4 %      Monocyte % 5.8 %      Eosinophil % 1.1 %      Basophil % 0.4 %      Immature Grans % 0.5 %      Neutrophils, Absolute 10.38 10*3/mm3      Lymphocytes, Absolute 1.32 10*3/mm3      Monocytes, Absolute 0.74 10*3/mm3      Eosinophils, Absolute  "0.14 10*3/mm3      Basophils, Absolute 0.05 10*3/mm3      Immature Grans, Absolute 0.06 10*3/mm3      nRBC 0.0 /100 WBC     Procalcitonin [[id number]]  (Abnormal) Collected: 02/24/25 1004    Specimen: Blood Updated: 02/24/25 1412     Procalcitonin 1.31 ng/mL     Narrative:      As a Marker for Sepsis (Non-Neonates):    1. <0.5 ng/mL represents a low risk of severe sepsis and/or septic shock.  2. >2 ng/mL represents a high risk of severe sepsis and/or septic shock.    As a Marker for Lower Respiratory Tract Infections that require antibiotic therapy:    PCT on Admission    Antibiotic Therapy       6-12 Hrs later    >0.5                Strongly Recommended  >0.25 - <0.5        Recommended   0.1 - 0.25          Discouraged              Remeasure/reassess PCT  <0.1                Strongly Discouraged     Remeasure/reassess PCT    As 28 day mortality risk marker: \"Change in Procalcitonin Result\" (>80% or <=80%) if Day 0 (or Day 1) and Day 4 values are available. Refer to http://www.[employer]-pct-calculator.com    Change in PCT <=80%  A decrease of PCT levels below or equal to 80% defines a positive change in PCT test result representing a higher risk for 28-day all-cause mortality of patients diagnosed with severe sepsis for septic shock.    Change in PCT >80%  A decrease of PCT levels of more than 80% defines a negative change in PCT result representing a lower risk for 28-day all-cause mortality of patients diagnosed with severe sepsis or septic shock.       Manual Differential [[id number]]  (Abnormal) Collected: 02/23/25 1536    Specimen: Blood Updated: 02/23/25 1800     Neutrophil % 86.0 %      Lymphocyte % 7.0 %      Monocyte % 5.0 %      Basophil % 1.0 %      Myelocyte % 1.0 %      Neutrophils Absolute 14.51 10*3/mm3      Lymphocytes Absolute 1.18 10*3/mm3      Monocytes Absolute 0.84 10*3/mm3      Basophils Absolute 0.17 10*3/mm3      RBC Morphology Normal     Platelet Estimate Adequate    Respiratory Panel PCR " w/COVID-19(SARS-CoV-2) [facility]/DINO In-House, NP Swab in UTM/VTM, 2 HR TAT - Swab, Nasopharynx [[id number]]  (Normal) Collected: 02/23/25 1644    Specimen: Swab from Nasopharynx Updated: 02/23/25 1750     ADENOVIRUS, PCR Not Detected     Coronavirus 229E Not Detected     Coronavirus HKU1 Not Detected     Coronavirus NL63 Not Detected     Coronavirus OC43 Not Detected     COVID19 Not Detected     Human Metapneumovirus Not Detected     Human Rhinovirus/Enterovirus Not Detected     Influenza A PCR Not Detected     Influenza B PCR Not Detected     Parainfluenza Virus 1 Not Detected     Parainfluenza Virus 2 Not Detected     Parainfluenza Virus 3 Not Detected     Parainfluenza Virus 4 Not Detected     RSV, PCR Not Detected     Bordetella pertussis pcr Not Detected     Bordetella parapertussis PCR Not Detected     Chlamydophila pneumoniae PCR Not Detected     Mycoplasma pneumo by PCR Not Detected    Narrative:      In the setting of a positive respiratory panel with a viral infection PLUS a negative procalcitonin without other underlying concern for bacterial infection, consider observing off antibiotics or discontinuation of antibiotics and continue supportive care. If the respiratory panel is positive for atypical bacterial infection (Bordetella pertussis, Chlamydophila pneumoniae, or Mycoplasma pneumoniae), consider antibiotic de-escalation to target atypical bacterial infection.    Comprehensive Metabolic Panel [[id number]]  (Abnormal) Collected: 02/23/25 1536    Specimen: Blood Updated: 02/23/25 1714     Glucose 118 mg/dL      BUN 12 mg/dL      Creatinine 0.81 mg/dL      Sodium 137 mmol/L      Potassium 4.3 mmol/L      Comment: Slight hemolysis detected by analyzer. Result may be falsely elevated.        Chloride 103 mmol/L      CO2 22.9 mmol/L      Calcium 7.9 mg/dL      Total Protein 5.0 g/dL      Albumin 3.3 g/dL      ALT (SGPT) 11 U/L      AST (SGOT) 18 U/L      Alkaline Phosphatase 70 U/L      Total  Bilirubin 0.4 mg/dL      Globulin 1.7 gm/dL      A/G Ratio 1.9 g/dL      BUN/Creatinine Ratio 14.8     Anion Gap 11.1 mmol/L      eGFR 72.1 mL/min/1.73     Narrative:      GFR Categories in Chronic Kidney Disease (CKD)      GFR Category          GFR (mL/min/1.73)    Interpretation  G1                     90 or greater         Normal or high (1)  G2                      60-89                Mild decrease (1)  G3a                   45-59                Mild to moderate decrease  G3b                   30-44                Moderate to severe decrease  G4                    15-29                Severe decrease  G5                    14 or less           Kidney failure          (1)In the absence of evidence of kidney disease, neither GFR category G1 or G2 fulfill the criteria for CKD.    eGFR calculation 2021 CKD-EPI creatinine equation, which does not include race as a factor    CBC & Differential [[id number]]  (Abnormal) Collected: 02/23/25 1536    Specimen: Blood Updated: 02/23/25 1659    Narrative:      The following orders were created for panel order CBC & Differential.  Procedure                               Abnormality         Status                     ---------                               -----------         ------                     CBC Auto Differential[[id number]]        Abnormal            Final result               Scan Slide[[id number]]                                                                    Please view results for these tests on the individual orders.    CBC Auto Differential [[id number]]  (Abnormal) Collected: 02/23/25 1536    Specimen: Blood Updated: 02/23/25 1659     WBC 16.87 10*3/mm3      RBC 3.57 10*6/mm3      Hemoglobin 11.4 g/dL      Hematocrit 34.6 %      MCV 96.9 fL      MCH 31.9 pg      MCHC 32.9 g/dL      RDW 13.1 %      RDW-SD 47.0 fl      MPV 9.6 fL      Platelets 190 10*3/mm3     Blood Gas, Arterial With Co-Ox [[id number]]  (Abnormal) Collected: 02/23/25 1602    Specimen: Arterial  Blood Updated: 02/23/25 1603     Site Right Radial     Samy's Test Positive     pH, Arterial 7.397 pH units      pCO2, Arterial 40.0 mm Hg      pO2, Arterial 55.8 mm Hg      Comment: 84 Value below reference range        HCO3, Arterial 24.6 mmol/L      Base Excess, Arterial -0.2 mmol/L      O2 Saturation, Arterial 91.4 %      Comment: 84 Value below reference range        Hemoglobin, Blood Gas 10.7 g/dL      Comment: 84 Value below reference range        Hematocrit, Blood Gas 32.8 %      Comment: 84 Value below reference range        Oxyhemoglobin 89.0 %      Comment: 84 Value below reference range        Methemoglobin 0.50 %      Carboxyhemoglobin 2.1 %      A-a DO2 42.1 mmHg      CO2 Content 25.8 mmol/L      Barometric Pressure for Blood Gas 728 mmHg      Modality Room Air     FIO2 21 %      Ventilator Mode NA     Collected by [id number]     Comment: Meter: [id number]     :  [id number]        pH, Temp Corrected --     pCO2, Temperature Corrected --     pO2, Temperature Corrected --          Imaging Results (Last 72 Hours)       Procedure Component Value Units Date/Time    XR Pelvis 1 or 2 View [[id number]] Collected: 02/24/25 0829     Updated: 02/24/25 0832    Narrative:      EXAM:    XR Pelvis, 1 or 2 Views     EXAM DATE:    2/23/2025 11:03 AM     CLINICAL HISTORY:    s/p left hip lexi, in pacu; S72.002A Fracture of unspecified part of  neck of left femur, initial encounter for closed fracture; S72.002A  Fracture of unspecified part of neck of left femur, initial encounter  for closed fracture     TECHNIQUE:    Frontal view of the pelvis.     COMPARISON:    2/22/2025     FINDINGS:    BONES/JOINTS:  Left total hip arthroplasty.  Components appear well  seated.  No acute fracture.  No dislocation.    SOFT TISSUES:  Unremarkable as visualized.       Impression:        Left total hip arthroplasty. Components appear well seated.     This report was finalized on 2/24/2025 8:30 AM by Dr. Bradley Rausch MD.        XR Chest 1 View [[id number]] Collected: 02/23/25 1938     Updated: 02/1941    Narrative:      PROCEDURE: X-ray examination of the chest performed on February 23, 2025. Single view. Upright position.     HISTORY: Hypoxia.     COMPARISON: None.     FINDINGS:     Mild enlarged heart size  Coarsened bronchovascular pattern to the lungs  Mild atelectasis at the lung bases  Left lower lobe pneumonia.  No pleural effusion or pneumothorax.  Mild dextroconvex curve at the mid thoracic spine.  Azygos lobe variant in the right upper lobe  No lytic or blastic lesion.  No free air in the upper abdomen.       Impression:         Mild enlarged heart size.  Coarsened bronchovascular pattern to the lungs  Possible left lower lobe pneumonia.  Azygos lobe variant in the right upper lobe  No fracture or dislocation.     This report was finalized on 2/23/2025 7:39 PM by Eamon Omalley MD.               Physical Exam:   Physical Exam  Vitals reviewed.   Constitutional:       General: She is not in acute distress.     Comments: Patient currently sleeping, eyes closed.  NAD.  Family member at bedside.   HENT:      Head: Normocephalic.   Cardiovascular:      Pulses: Normal pulses.   Pulmonary:      Effort: Pulmonary effort is normal. No respiratory distress.   Musculoskeletal:      Comments: Dressing CDI left hip.  Thigh supple.  No sign of hematoma.+PP   Skin:     General: Skin is warm and dry.   Neurological:      Mental Status: Mental status is at baseline.   Psychiatric:         Behavior: Behavior normal.          Results Review:     I reviewed the patient's new clinical results.      Assessment & Plan     Principal Problem:    Hip fracture    Postoperative day 3 left hip hemiarthroplasty secondary to femoral neck fracture    PT/OT: WBAT with assistive device, anterior hip precautions, no active abduction.    Wound care: Maintain dressing at this time may reinforce as needed for saturation.    DVT/PPx: Lovenox 40 mg subcu daily x  14 days.    Make follow-up appointment orthopedic office in 2 weeks upon discharge.    Hospitalist attending, providing medical management.      Plan for disposition: TBD hospitalist.     HUBERT Grover  02/26/25  10:37 EST          Electronically signed by Ivette Gandhi APRN at 02/26/25 1040          Consult Notes (most recent note)        Perry Cobb MD at 02/23/25 0906        Consult Orders    1. Inpatient Orthopedic Surgery Consult [[id number]] ordered by Aysha Cardoza APRN at 02/23/25 0153                 Consult Note    Reason for Consultation: Left hip fracture    Chief complaint left hip pain x 1 day    History of present illness: History of Present Illness    The patient is an 84-year-old who has left hip pain after a fall from standing height.  She was ambulating in her kitchen last night with her significant other tripped and fell onto her left side.  She has left hip pain.  She does ambulate with a walker and a cane and with assistance by her significant other.  The significant other reports significant pain but pain has improved since IV pain Bacid.  She has no previous history of a surgery or pain to the left hip.    The patient was minimally verbal during this encounter.  Majority of the history was obtained from the nursing staff and the significant other.      Review of Systems  A complete review of systems was completed and was negative except for what is noted in the HPI.    History  History reviewed. No pertinent past medical history., History reviewed. No pertinent surgical history., History reviewed. No pertinent family history.,   Social History     Tobacco Use    Smoking status: Never     Passive exposure: Past    Smokeless tobacco: Never   Vaping Use    Vaping status: Never Used   Substance Use Topics    Alcohol use: Never    Drug use: Never   ,   No medications prior to admission.   , Scheduled Meds:  cefTRIAXone, 1,000 mg, Intravenous, Q24H  lactated ringers, 125  mL/hr, Intravenous, Once  [Transfer Hold] ropivacaine 0.5 % 250 mg, Morphine 10 mg, cloNIDine 80 mcg, ketorolac 30 mg, EPINEPHrine 1 mg/mL 0.5 mg in sodium chloride 0.9 % 50 mL solution, , Intra-articular, Once  sodium chloride, 10 mL, Intravenous, Q12H  sodium chloride, 10 mL, Intravenous, Q12H    , Continuous Infusions:   , PRN Meds:    acetaminophen **OR** acetaminophen **OR** acetaminophen    senna-docusate sodium **AND** polyethylene glycol **AND** bisacodyl **AND** bisacodyl    HYDROcodone-acetaminophen    midazolam    Morphine **AND** naloxone    [COMPLETED] Insert Peripheral IV **AND** sodium chloride    sodium chloride    sodium chloride    sodium chloride    sodium chloride and Allergies:  Sulfa antibiotics    Objective     Vital Signs   Temp:  [97.8 °F (36.6 °C)-98.6 °F (37 °C)] 97.8 °F (36.6 °C)  Heart Rate:  [] 98  Resp:  [17-18] 17  BP: (115-167)/(54-89) 132/64    Physical Exam:  Constitutional:  Alert. Well developed and well nourished. No acute distress.      HENT:  Head: Normocephalic and atraumatic.  Mouth:  Moist mucous membranes.    Eyes:  Conjunctivae and EOM are normal.  No scleral icterus.  Neck:  Neck supple.  No JVD present.   Cardiovascular:  +2 radial, +2 dorsalis pedis, +2 posterior tibialis bilaterally   Pulmonary/Chest:  No respiratory distress. Adequate volumes noted.   Abdominal:  Soft.  No distension and no tenderness.   Musculoskeletal:     Spine- No c/t/l/s spine tenderness, Cervical ROM without pain   RUE- Painless ROM shoulder/elbow/wrist/fingers, no edema, no ecchymosis, no gross deformity, no open injury   LUE- Painless ROM shoulder/elbow/wrist/fingers, no edema, no ecchymosis, no gross deformity, no open injury   RLE- Painless ROM hip/knee/ankle, no edema, no ecchymosis, no gross deformity, no open injury   LLE-shortened externally rotated, positive logroll and axial load   Pelvis- negative pelvic rock   Neurological: SILT Ax/LABC/m/r/u, able to perform shoulder  abduction/elbow flex-ext/wrist flex-ext/finger-thumb flex-ext-abd-add, SILT s/s/dp/sp/t, able to perform ankle DF/PF/EHL  Skin:  Skin is warm and dry.  No rash noted.  No pallor.     Labs:    Lab Results (last 72 hours)       Procedure Component Value Units Date/Time    Urine Culture - Urine, Urine, Clean Catch [[id number]] Collected: 02/23/25 0537    Specimen: Urine, Clean Catch Updated: 02/23/25 0839    [place] Urine Culture Tube - Urine, Clean Catch [[id number]] Collected: 02/23/25 0537    Specimen: Urine, Clean Catch Updated: 02/23/25 0603     Extra Tube Hold for add-ons.     Comment: Auto resulted.       Urinalysis, Microscopic Only - Urine, Clean Catch [[id number]]  (Abnormal) Collected: 02/23/25 0537    Specimen: Urine, Clean Catch Updated: 02/23/25 0602     RBC, UA None Seen /HPF      WBC, UA 6-10 /HPF      Bacteria, UA 4+ /HPF      Squamous Epithelial Cells, UA 0-2 /HPF      Hyaline Casts, UA None Seen /LPF      Methodology Manual Light Microscopy    Urinalysis With Microscopic If Indicated (No Culture) - Urine, Clean Catch [[id number]]  (Abnormal) Collected: 02/23/25 0537    Specimen: Urine, Clean Catch Updated: 02/23/25 0558     Color, UA Yellow     Appearance, UA Clear     pH, UA 8.0     Specific Gravity, UA 1.018     Glucose, UA Negative     Ketones, UA Negative     Bilirubin, UA Negative     Blood, UA Negative     Protein, UA Negative     Leuk Esterase, UA Small (1+)     Nitrite, UA Positive     Urobilinogen, UA 1.0 E.U./dL    Comprehensive Metabolic Panel [[id number]]  (Abnormal) Collected: 02/23/25 0508    Specimen: Blood Updated: 02/23/25 0534     Glucose 147 mg/dL      BUN 12 mg/dL      Creatinine 0.77 mg/dL      Sodium 137 mmol/L      Potassium 3.9 mmol/L      Chloride 102 mmol/L      CO2 25.5 mmol/L      Calcium 8.7 mg/dL      Total Protein 6.6 g/dL      Albumin 4.1 g/dL      ALT (SGPT) 12 U/L      AST (SGOT) 17 U/L      Alkaline Phosphatase 93 U/L      Total Bilirubin 0.4 mg/dL      Globulin 2.5  gm/dL      A/G Ratio 1.6 g/dL      BUN/Creatinine Ratio 15.6     Anion Gap 9.5 mmol/L      eGFR 76.6 mL/min/1.73     Narrative:      GFR Categories in Chronic Kidney Disease (CKD)      GFR Category          GFR (mL/min/1.73)    Interpretation  G1                     90 or greater         Normal or high (1)  G2                      60-89                Mild decrease (1)  G3a                   45-59                Mild to moderate decrease  G3b                   30-44                Moderate to severe decrease  G4                    15-29                Severe decrease  G5                    14 or less           Kidney failure          (1)In the absence of evidence of kidney disease, neither GFR category G1 or G2 fulfill the criteria for CKD.    eGFR calculation 2021 CKD-EPI creatinine equation, which does not include race as a factor    Protime-INR [[id number]]  (Normal) Collected: 02/23/25 0508    Specimen: Blood Updated: 02/23/25 0522     Protime 13.0 Seconds      INR 0.97    Narrative:      Suggested INR therapeutic range for stable oral anticoagulant therapy:    Low Intensity therapy:   1.5-2.0  Moderate Intensity therapy:   2.0-3.0  High Intensity therapy:   2.5-4.0    CBC Auto Differential [[id number]]  (Abnormal) Collected: 02/23/25 0508    Specimen: Blood Updated: 02/23/25 0513     WBC 14.15 10*3/mm3      RBC 4.41 10*6/mm3      Hemoglobin 13.8 g/dL      Hematocrit 42.5 %      MCV 96.4 fL      MCH 31.3 pg      MCHC 32.5 g/dL      RDW 12.6 %      RDW-SD 45.2 fl      MPV 8.7 fL      Platelets 247 10*3/mm3      Neutrophil % 85.2 %      Lymphocyte % 8.6 %      Monocyte % 5.2 %      Eosinophil % 0.0 %      Basophil % 0.4 %      Immature Grans % 0.6 %      Neutrophils, Absolute 12.06 10*3/mm3      Lymphocytes, Absolute 1.21 10*3/mm3      Monocytes, Absolute 0.74 10*3/mm3      Eosinophils, Absolute 0.00 10*3/mm3      Basophils, Absolute 0.06 10*3/mm3      Immature Grans, Absolute 0.08 10*3/mm3      nRBC 0.0 /100 WBC      TSH [[id number]]  (Normal) Collected: 02/23/25 0155    Specimen: Blood from Arm, Left Updated: 02/23/25 0458     TSH 1.800 uIU/mL     Magnesium [[id number]]  (Normal) Collected: 02/23/25 0155    Specimen: Blood from Arm, Left Updated: 02/23/25 0445     Magnesium 2.3 mg/dL     Comprehensive Metabolic Panel [[id number]]  (Abnormal) Collected: 02/23/25 0155    Specimen: Blood from Arm, Left Updated: 02/23/25 0222     Glucose 171 mg/dL      BUN 14 mg/dL      Creatinine 0.81 mg/dL      Sodium 136 mmol/L      Potassium 3.8 mmol/L      Chloride 101 mmol/L      CO2 23.9 mmol/L      Calcium 9.1 mg/dL      Total Protein 7.0 g/dL      Albumin 4.1 g/dL      ALT (SGPT) 12 U/L      AST (SGOT) 16 U/L      Alkaline Phosphatase 100 U/L      Total Bilirubin 0.4 mg/dL      Globulin 2.9 gm/dL      A/G Ratio 1.4 g/dL      BUN/Creatinine Ratio 17.3     Anion Gap 11.1 mmol/L      eGFR 72.1 mL/min/1.73     Narrative:      GFR Categories in Chronic Kidney Disease (CKD)      GFR Category          GFR (mL/min/1.73)    Interpretation  G1                     90 or greater         Normal or high (1)  G2                      60-89                Mild decrease (1)  G3a                   45-59                Mild to moderate decrease  G3b                   30-44                Moderate to severe decrease  G4                    15-29                Severe decrease  G5                    14 or less           Kidney failure          (1)In the absence of evidence of kidney disease, neither GFR category G1 or G2 fulfill the criteria for CKD.    eGFR calculation 2021 CKD-EPI creatinine equation, which does not include race as a factor    CBC & Differential [[id number]]  (Abnormal) Collected: 02/23/25 0155    Specimen: Blood from Arm, Left Updated: 02/23/25 0201    Narrative:      The following orders were created for panel order CBC & Differential.  Procedure                               Abnormality         Status                     ---------                                -----------         ------                     CBC Auto Differential[[id number]]        Abnormal            Final result                 Please view results for these tests on the individual orders.    CBC Auto Differential [[id number]]  (Abnormal) Collected: 02/23/25 0155    Specimen: Blood from Arm, Left Updated: 02/23/25 0201     WBC 16.27 10*3/mm3      RBC 4.59 10*6/mm3      Hemoglobin 14.2 g/dL      Hematocrit 43.7 %      MCV 95.2 fL      MCH 30.9 pg      MCHC 32.5 g/dL      RDW 12.6 %      RDW-SD 44.5 fl      MPV 8.7 fL      Platelets 258 10*3/mm3      Neutrophil % 89.9 %      Lymphocyte % 5.1 %      Monocyte % 4.4 %      Eosinophil % 0.0 %      Basophil % 0.2 %      Immature Grans % 0.4 %      Neutrophils, Absolute 14.62 10*3/mm3      Lymphocytes, Absolute 0.83 10*3/mm3      Monocytes, Absolute 0.71 10*3/mm3      Eosinophils, Absolute 0.00 10*3/mm3      Basophils, Absolute 0.04 10*3/mm3      Immature Grans, Absolute 0.07 10*3/mm3      nRBC 0.0 /100 WBC     [place] Draw [[id number]] Collected: 02/23/25 0155    Specimen: Blood from Arm, Left Updated: 02/23/25 0200    Narrative:      The following orders were created for panel order [place] Draw.  Procedure                               Abnormality         Status                     ---------                               -----------         ------                     Green Top (Gel)[[id number]]                                  Final result               Lavender Top[[id number]]                                     Final result               Gold Top - SST[[id number]]                                   Final result               Light Blue Top[[id number]]                                   Final result                 Please view results for these tests on the individual orders.    Green Top (Gel) [[id number]] Collected: 02/23/25 0155    Specimen: Blood from Arm, Left Updated: 02/23/25 0200     Extra Tube Hold for add-ons.     Comment: Auto resulted.        Lavender Top [[id number]] Collected: 25    Specimen: Blood from Arm, Left Updated: 25 0200     Extra Tube hold for add-on     Comment: Auto resulted       Gold Top - SST [[id number]] Collected: 25    Specimen: Blood from Arm, Left Updated: 25 0200     Extra Tube Hold for add-ons.     Comment: Auto resulted.       Light Blue Top [[id number]] Collected: 25    Specimen: Blood from Arm, Left Updated: 25 0200     Extra Tube Hold for add-ons.     Comment: Auto resulted               Images:    Left hip and femur x-rays reviewed.  Femoral neck fracture displaced as noted.  No other fracture.  Mild arthritic changes at the hip joint.       Assessment  Left femoral neck fracture, displaced    Plan    Due to the patient's acute nature of her injury and ambulatory status I would recommend a left hip hemiarthroplasty.    This was discussed significantly with the patient's significant other.    Risk benefits alternatives and complications was discussed with the patient prior to surgery as well as the patient's significant other.    Maintain n.p.o. status for now.  Anticipate surgery 2025.      Perry Cobb MD  25  09:07 EST      Electronically signed by Perry Cobb MD at 25 0916          Physical Therapy Notes (most recent note)        Barbara De La Cruz, PT at 25 1657  Version 1 of 1         Acute Care - Physical Therapy Treatment Note  ESTELLA Brock     Patient Name: Crista Blunt  : 1940  MRN: [id number]  Today's Date: 2025   Onset of Illness/Injury or Date of Surgery: 25  Visit Dx:     ICD-10-CM ICD-9-CM   1. Closed fracture of neck of left femur, initial encounter  S72.002A 820.8   2. Closed fracture of left hip, initial encounter  S72.002A 820.8     Patient Active Problem List   Diagnosis    Hip fracture     History reviewed. No pertinent past medical history.  History reviewed. No pertinent surgical history.  PT Assessment (Last 12  Hours)       PT Evaluation and Treatment       Row Name 02/25/25 1435          Physical Therapy Time and Intention    Document Type therapy note (daily note)  -     Mode of Treatment physical therapy  -     Patient Effort adequate  -     Comment Pt seen with , Donavon, at bedside, attentive and helpful with tx. Pt assisted to sit EOB and stand grossly x1-2 at max/depA. Limited 2/2 cognition. Attempted LE TE with some intentional repetitions with cues. Concerns/questions from  addressed, discussed husb to reach out to RN PRN.  -[facility] Name 02/25/25 1435          Bed Mobility    Bed Mobility supine-sit;sit-supine  -     Supine-Sit [place] (Bed Mobility) 2 person assist;maximum assist (25% patient effort);dependent (less than 25% patient effort)  -     Sit-Supine [place] (Bed Mobility) dependent (less than 25% patient effort);2 person assist  -KH       Row Name 02/25/25 1435          Transfers    Transfers sit-stand transfer;stand-sit transfer  -KH       Row Name 02/25/25 1435          Sit-Stand Transfer    Sit-Stand [place] (Transfers) dependent (less than 25% patient effort);2 person assist;1 person assist  -KH       Row Name 02/25/25 1435          Stand-Sit Transfer    Stand-Sit [place] (Transfers) dependent (less than 25% patient effort);2 person assist;1 person assist  -KH       Row Name 02/25/25 1435          Gait/Stairs (Locomotion)    Patient was able to Ambulate no, other medical factors prevent ambulation  -     Reason Patient was unable to Ambulate Other (Comment)  unsafe at this time; pain and confusion limiting  -[facility] Name 02/25/25 1435          Balance    Comment, Balance Sitting EOB pt had partial posterior lean, encouraged to lean forward in preparation for standing  -KH       Row Name 02/25/25 1435          Motor Skills    Therapeutic Exercise knee;other (see comments);ankle  encouraged SAQ with partial quad activation, grossly x5 reps;  encouraged hip add without significant contraction appreciated. DF/PF encouraged with partial contraction  -       Row Name             Wound 02/23/25 0959 Left anterior thigh    Wound - Properties Group Placement Date: 02/23/25  -JG Placement Time: 0959 -JG Side: Left  -JG Orientation: anterior  -JG Location: thigh  -JG    Retired Wound - Properties Group Placement Date: 02/23/25  -JG Placement Time: 0959 -JG Side: Left  -JG Orientation: anterior  -JG Location: thigh  -JG    Retired Wound - Properties Group Placement Date: 02/23/25  -JG Placement Time: 0959 -JG Side: Left  -JG Orientation: anterior  -JG Location: thigh  -JG    Retired Wound - Properties Group Date first assessed: 02/23/25  -JG Time first assessed: 0959 -JG Side: Left  -JG Location: thigh  -JG      Row Name 02/25/25 1435          Positioning and Restraints    Pre-Treatment Position in bed  -     Post Treatment Position bed  -     In Bed supine;exit alarm on;with family/caregiver  -       Row Name 02/25/25 1435          Progress Summary (PT)    Daily Progress Summary (PT) Pt seen for treatment this date, participation was good, confusion limits pt participation; grossly max/depA at this time, no change to POC. Prognosis guarded to fair.  -               User Key  (r) = Recorded By, (t) = Taken By, (c) = Cosigned By      Initials Name Provider Type    Kristin Avilez, RN Registered Nurse    Barbara Galarza, HAYLEE Physical Therapist                    Physical Therapy Education        No education to display                  PT Recommendation and Plan     Progress Summary (PT)  Daily Progress Summary (PT): Pt seen for treatment this date, participation was good, confusion limits pt participation; grossly max/depA at this time, no change to POC. Prognosis guarded to fair.       Time Calculation:    PT Charges       Row Name 02/25/25 1656             Time Calculation    Start Time 1435  -KH      PT Received On 02/25/25  -          Time Calculation- PT    Total Timed Code Minutes- PT 23 minute(s)  -TALI                User Key  (r) = Recorded By, (t) = Taken By, (c) = Cosigned By      Initials Name Provider Type    Barbara Galarza, HAYLEE Physical Therapist                  Therapy Charges for Today       Code Description Service Date Service Provider Modifiers Qty    91339474739  PT THER PROC EA 15 MIN 2025 Barbara De La Cruz, PT GP 1    30245659282 HC PT THERAPEUTIC ACT EA 15 MIN 2025 Barbara De La Cruz, PT GP 1            PT G-Codes  AM-PAC 6 Clicks Score (PT): 8    Barbara De La Cruz PT  2025      Electronically signed by Barbara De La Cruz PT at 25 1657          Occupational Therapy Notes (most recent note)        Perry Sauceda, OT at 25 1505          Acute Care - Occupational Therapy Initial Evaluation   [facility]in[facility]     Patient Name: Crista Blunt  : 1940  MRN: [id number]  Today's Date: 2025  Onset of Illness/Injury or Date of Surgery: 25     Referring Physician: Jordyn    Admit Date: 2025       ICD-10-CM ICD-9-CM   1. Closed fracture of neck of left femur, initial encounter  S72.002A 820.8   2. Closed fracture of left hip, initial encounter  S72.002A 820.8     Patient Active Problem List   Diagnosis    Hip fracture     History reviewed. No pertinent past medical history.  History reviewed. No pertinent surgical history.      OT ASSESSMENT FLOWSHEET (Last 12 Hours)       OT Evaluation and Treatment       Row Name 25 1447                   OT Time and Intention    Document Type evaluation  -KR        Mode of Treatment occupational therapy  -KR        Patient Effort adequate  -KR           General Information    Prior Level of Function min assist:  -KR           Living Environment    Current Living Arrangements home  -KR        People in Home spouse;other relative(s)  -KR           Cognition    Affect/Mental Status (Cognition) confused  -KR        Orientation Status (Cognition) disoriented  to;place;situation;time  -KR           Range of Motion Comprehensive    Comment, General Range of Motion BUE observed WFL to assist with self care/mobility tasks  -KR           Strength Comprehensive (MMT)    Comment, General Manual Muscle Testing (MMT) Assessment WFL to assist with mobility/self care, generally decreased  -KR           Activities of Daily Living    BADL Assessment/Intervention bathing;upper body dressing;lower body dressing;grooming;toileting  -KR           Bathing Assessment/Intervention    [place] Level (Bathing) bathing skills;dependent (less than 25% patient effort)  -KR           Upper Body Dressing Assessment/Training    [place] Level (Upper Body Dressing) upper body dressing skills;dependent (less than 25% patient effort)  -KR           Lower Body Dressing Assessment/Training    [place] Level (Lower Body Dressing) lower body dressing skills;dependent (less than 25% patient effort)  -KR           Grooming Assessment/Training    [place] Level (Grooming) grooming skills  -KR           Bed Mobility    Scooting/Bridging [place] (Bed Mobility) dependent (less than 25% patient effort)  -KR        Supine-Sit [place] (Bed Mobility) dependent (less than 25% patient effort)  -KR        Sit-Supine [place] (Bed Mobility) dependent (less than 25% patient effort)  -KR           Wound 02/23/25 0959 Left anterior thigh    Wound - Properties Group Placement Date: 02/23/25  -JG Placement Time: 0959 -JG Side: Left  -JG Orientation: anterior  -JG Location: thigh  -JG    Retired Wound - Properties Group Placement Date: 02/23/25  -JG Placement Time: 0959 -JG Side: Left  -JG Orientation: anterior  -JG Location: thigh  -JG    Retired Wound - Properties Group Placement Date: 02/23/25  -JG Placement Time: 0959 -JG Side: Left  -JG Orientation: anterior  -JG Location: thigh  -JG    Retired Wound - Properties Group Date first assessed: 02/23/25  -JG Time first assessed: 0959 -JG  Side: Left  -JG Location: thigh  -JG       Plan of Care Review    Plan of Care Reviewed With patient;daughter  -KR           Therapy Assessment/Plan (OT)    Planned Therapy Interventions (OT) activity tolerance training;BADL retraining;transfer/mobility retraining  -KR           Therapy Plan Review/Discharge Plan (OT)    Anticipated Discharge Disposition (OT) extended care facility;inpatient rehabilitation facility  to enhance ability to assist caregiver in home  -KR           OT Goals    Bed Mobility Goal Selection (OT) bed mobility, OT goal 1  -KR        Activity Tolerance Goal Selection (OT) activity tolerance, OT goal 1  -KR           Bed Mobility Goal 1 (OT)    Activity/Assistive Device (Bed Mobility Goal 1, OT) bed mobility activities, all  -KR        [place] Level/Cues Needed (Bed Mobility Goal 1, OT) minimum assist (75% or more patient effort)  -KR        Time Frame (Bed Mobility Goal 1, OT) by discharge  -KR            Activity Tolerance Goal 1 (OT)    Activity Tolerance Goal 1 (OT) Increase to enhance ADL performance/mobility performance  -KR        Activity Level (Endurance Goal 1, OT) 15 min activity  -KR        Time Frame (Activity Tolerance Goal 1, OT) by discharge  -KR                  User Key  (r) = Recorded By, (t) = Taken By, (c) = Cosigned By      Initials Name Effective Dates     Kristin Haro RN 08/29/23 -     Perry Howe OT 06/16/21 -                      Occupational Therapy Education        No education to display                      OT Recommendation and Plan  Planned Therapy Interventions (OT): activity tolerance training, BADL retraining, transfer/mobility retraining  Plan of Care Review  Plan of Care Reviewed With: patient, daughter  Plan of Care Reviewed With: patient, daughter        Time Calculation:     Therapy Charges for Today       Code Description Service Date Service Provider Modifiers Qty    27158157124  OT EVAL HIGH COMPLEXITY 4 2/24/2025 Perry Sauceda, OT  GO 1    22205490127 HC OT SELF CARE/MGMT/TRAIN EA 15 MIN 2/24/2025 Perry Sauceda, OT GO 1                 Perry Sauceda OT  2/24/2025    Electronically signed by Perry Sauceda OT at 02/24/25 4129

## 2025-02-27 LAB
ANION GAP SERPL CALCULATED.3IONS-SCNC: 9.3 MMOL/L (ref 5–15)
BASOPHILS # BLD AUTO: 0.05 10*3/MM3 (ref 0–0.2)
BASOPHILS NFR BLD AUTO: 0.5 % (ref 0–1.5)
BUN SERPL-MCNC: 12 MG/DL (ref 8–23)
BUN/CREAT SERPL: 18.2 (ref 7–25)
CALCIUM SPEC-SCNC: 8.3 MG/DL (ref 8.6–10.5)
CHLORIDE SERPL-SCNC: 105 MMOL/L (ref 98–107)
CO2 SERPL-SCNC: 23.7 MMOL/L (ref 22–29)
CREAT SERPL-MCNC: 0.66 MG/DL (ref 0.57–1)
DEPRECATED RDW RBC AUTO: 47.2 FL (ref 37–54)
EGFRCR SERPLBLD CKD-EPI 2021: 87.2 ML/MIN/1.73
EOSINOPHIL # BLD AUTO: 0.51 10*3/MM3 (ref 0–0.4)
EOSINOPHIL NFR BLD AUTO: 5.2 % (ref 0.3–6.2)
ERYTHROCYTE [DISTWIDTH] IN BLOOD BY AUTOMATED COUNT: 12.9 % (ref 12.3–15.4)
GLUCOSE SERPL-MCNC: 105 MG/DL (ref 65–99)
HCT VFR BLD AUTO: 35.1 % (ref 34–46.6)
HGB BLD-MCNC: 10.8 G/DL (ref 12–15.9)
IMM GRANULOCYTES # BLD AUTO: 0.05 10*3/MM3 (ref 0–0.05)
IMM GRANULOCYTES NFR BLD AUTO: 0.5 % (ref 0–0.5)
LYMPHOCYTES # BLD AUTO: 1.7 10*3/MM3 (ref 0.7–3.1)
LYMPHOCYTES NFR BLD AUTO: 17.3 % (ref 19.6–45.3)
MCH RBC QN AUTO: 30.7 PG (ref 26.6–33)
MCHC RBC AUTO-ENTMCNC: 30.8 G/DL (ref 31.5–35.7)
MCV RBC AUTO: 99.7 FL (ref 79–97)
MONOCYTES # BLD AUTO: 0.86 10*3/MM3 (ref 0.1–0.9)
MONOCYTES NFR BLD AUTO: 8.7 % (ref 5–12)
NEUTROPHILS NFR BLD AUTO: 6.67 10*3/MM3 (ref 1.7–7)
NEUTROPHILS NFR BLD AUTO: 67.8 % (ref 42.7–76)
NRBC BLD AUTO-RTO: 0 /100 WBC (ref 0–0.2)
PLATELET # BLD AUTO: 284 10*3/MM3 (ref 140–450)
PMV BLD AUTO: 9 FL (ref 6–12)
POTASSIUM SERPL-SCNC: 3.8 MMOL/L (ref 3.5–5.2)
RBC # BLD AUTO: 3.52 10*6/MM3 (ref 3.77–5.28)
SODIUM SERPL-SCNC: 138 MMOL/L (ref 136–145)
WBC NRBC COR # BLD AUTO: 9.84 10*3/MM3 (ref 3.4–10.8)

## 2025-02-27 PROCEDURE — 97530 THERAPEUTIC ACTIVITIES: CPT

## 2025-02-27 PROCEDURE — 25010000002 ENOXAPARIN PER 10 MG: Performed by: GENERAL PRACTICE

## 2025-02-27 PROCEDURE — 85025 COMPLETE CBC W/AUTO DIFF WBC: CPT | Performed by: FAMILY MEDICINE

## 2025-02-27 PROCEDURE — 99232 SBSQ HOSP IP/OBS MODERATE 35: CPT | Performed by: FAMILY MEDICINE

## 2025-02-27 PROCEDURE — 97110 THERAPEUTIC EXERCISES: CPT

## 2025-02-27 PROCEDURE — 80048 BASIC METABOLIC PNL TOTAL CA: CPT | Performed by: FAMILY MEDICINE

## 2025-02-27 RX ADMIN — Medication 10 ML: at 08:44

## 2025-02-27 RX ADMIN — AZITHROMYCIN DIHYDRATE 250 MG: 250 TABLET ORAL at 08:44

## 2025-02-27 RX ADMIN — POLYETHYLENE GLYCOL (3350) 17 G: 17 POWDER, FOR SOLUTION ORAL at 08:44

## 2025-02-27 RX ADMIN — Medication 10 ML: at 22:15

## 2025-02-27 RX ADMIN — CEFDINIR 300 MG: 300 CAPSULE ORAL at 22:15

## 2025-02-27 RX ADMIN — ENOXAPARIN SODIUM 40 MG: 100 INJECTION SUBCUTANEOUS at 08:44

## 2025-02-27 RX ADMIN — CEFDINIR 300 MG: 300 CAPSULE ORAL at 08:44

## 2025-02-27 NOTE — THERAPY TREATMENT NOTE
Acute Care - Physical Therapy Treatment Note   [facility]in[facility]     Patient Name: Crista Blunt  : 1940  MRN: [id number]  Today's Date: 2025   Onset of Illness/Injury or Date of Surgery: 25  Visit Dx:     ICD-10-CM ICD-9-CM   1. Closed fracture of neck of left femur, initial encounter  S72.002A 820.8   2. Closed fracture of left hip, initial encounter  S72.002A 820.8     Patient Active Problem List   Diagnosis    Hip fracture     History reviewed. No pertinent past medical history.  History reviewed. No pertinent surgical history.  PT Assessment (Last 12 Hours)       PT Evaluation and Treatment       Row Name 25 1125          Physical Therapy Time and Intention    Document Type therapy note (daily note)  -     Mode of Treatment physical therapy  -     Patient Effort fair  -     Comment Pt was able to participate more in stand seemingly, grossly maxAx2; inconsistency with following directions most of the time - was able to have 5 consecutive reps of SAQ on L LE, other TE attempted pt did not demonstrate active ROM. Discussed SNF with  this date as he asked PT input on pt. He expresses concern of pt inability to ambulate and care for her.  -       Row Name 25 1125          Bed Mobility    Bed Mobility supine-sit;sit-supine  -     Scooting/Bridging [place] (Bed Mobility) dependent (less than 25% patient effort);set up  encouraged for pt to participate with scooting, some active R knee flexion appreciated  -     Supine-Sit [place] (Bed Mobility) maximum assist (25% patient effort);dependent (less than 25% patient effort);2 person assist  -     Sit-Supine [place] (Bed Mobility) dependent (less than 25% patient effort)  -       Row Name 25 1125          Transfers    Transfers sit-stand transfer;stand-sit transfer  -       Row Name 25 1125          Sit-Stand Transfer    Sit-Stand [place] (Transfers) 2 person assist;maximum assist (25% patient  effort)  -       Row Name 02/27/25 1125          Stand-Sit Transfer    Stand-Sit [place] (Transfers) 2 person assist;maximum assist (25% patient effort)  -       Row Name 02/27/25 1125          Gait/Stairs (Locomotion)    Patient was able to Ambulate no, other medical factors prevent ambulation  -     Reason Patient was unable to Ambulate Cognitive Deficit/Severe Dementia;Other (Comment)  unsafe on pt and staff,  -       Row Name 02/27/25 1125          Motor Skills    Therapeutic Exercise other (see comments)  attempted marches, hip add, PF/DF with slight effort, some SAQ grossly x5 L LE; R LE movement when given direction to participate in AROM  -       Row Name             Wound 02/23/25 0959 Left anterior thigh    Wound - Properties Group Placement Date: 02/23/25  -JG Placement Time: 0959 -JG Side: Left  -JG Orientation: anterior  -JG Location: thigh  -JG    Retired Wound - Properties Group Placement Date: 02/23/25  -JG Placement Time: 0959  -JG Side: Left  -JG Orientation: anterior  -JG Location: thigh  -JG    Retired Wound - Properties Group Placement Date: 02/23/25  -JG Placement Time: 0959  -JG Side: Left  -JG Orientation: anterior  -JG Location: thigh  -JG    Retired Wound - Properties Group Date first assessed: 02/23/25  -JG Time first assessed: 0959  -JG Side: Left  -JG Location: thigh  -JG      Row Name 02/27/25 1125          Positioning and Restraints    Pre-Treatment Position in bed  -     Post Treatment Position bed  -     In Bed supine;exit alarm on;with family/caregiver  -[facility] Name 02/27/25 1125          Progress Summary (PT)    Daily Progress Summary (PT) Pt was able to participate more in stand seemingly, grossly maxAx2; inconsistency with following directions most of the time - was able to have 5 consecutive reps of SAQ on L LE, other TE attempted pt did not demonstrate active ROM.  -               User Key  (r) = Recorded By, (t) = Taken By, (c) = Cosigned By       Initials Name Provider Type    Kristin Avilez, RN Registered Nurse    Barbara Galarza PT Physical Therapist                    Physical Therapy Education        No education to display                  PT Recommendation and Plan     Progress Summary (PT)  Daily Progress Summary (PT): Pt was able to participate more in stand seemingly, grossly maxAx2; inconsistency with following directions most of the time - was able to have 5 consecutive reps of SAQ on L LE, other TE attempted pt did not demonstrate active ROM.       Time Calculation:    PT Charges       Row Name 02/27/25 1157             Time Calculation    Start Time 1125  -      PT Received On 02/27/25  -         Time Calculation- PT    Total Timed Code Minutes- PT 15 minute(s)  -                User Key  (r) = Recorded By, (t) = Taken By, (c) = Cosigned By      Initials Name Provider Type    Barbara Galarza, HAYLEE Physical Therapist                  Therapy Charges for Today       Code Description Service Date Service Provider Modifiers Qty    43755982237 HC PT THER PROC EA 15 MIN 2/26/2025 Barbara De La Cruz, PT GP 1    61293546453 HC PT THERAPEUTIC ACT EA 15 MIN 2/26/2025 Barbara De La Cruz, PT GP 1    56847193418 HC PT THERAPEUTIC ACT EA 15 MIN 2/27/2025 Barbara De La Cruz, PT GP 1            PT G-Codes  AM-PAC 6 Clicks Score (PT): 12    Barbara De La Cruz, PT  2/27/2025

## 2025-02-27 NOTE — CASE MANAGEMENT/SOCIAL WORK
Discharge Planning Assessment  Psychiatric     Patient Name: Crista Blunt  MRN: [id number]  Today's Date: 2/27/2025    Admit Date: 2/22/2025            Discharge Plan       Row Name 02/27/25 1113       Plan    Plan SS spoke with Pt's spouse at bedside. Spouse is agreeable to SNF placement, prefers [facility]. SS contacted [facility] per Aura and made her aware of referral being faxed in EPIC for review. SS to follow.    14:25pm: SS notified by [facility] per Aura that Pt has been accepted for admission tomorrow if pre-auth approved and mentally stable. SS to follow.                   Continued Care and Services - Admitted Since 2/22/2025       Destination       Service Provider Request Status Services Address Phone Fax Patient Preferred    [facility] Pending - Request Sent -- [address] [facility] [address] [phone] --    [facility] Declined  Bed not available -- [address] [address] [phone] --                    YULIYA Pickering

## 2025-02-27 NOTE — PLAN OF CARE
Goal Outcome Evaluation:  Plan of Care Reviewed With: patient        Progress: improving  Outcome Evaluation: Patient resting in bed at this time. VSS on RA.  at bedside. No signs of acute distress noted at this time. Patient voices no concerns. Will continue with plan of care.

## 2025-02-27 NOTE — THERAPY TREATMENT NOTE
Acute Care - Occupational Therapy Treatment Note   [facility]     Patient Name: Crista Blunt  : 1940  MRN: [id number]  Today's Date: 2025  Onset of Illness/Injury or Date of Surgery: 25     Referring Physician: Jordyn    Admit Date: 2025       ICD-10-CM ICD-9-CM   1. Closed fracture of neck of left femur, initial encounter  S72.002A 820.8   2. Closed fracture of left hip, initial encounter  S72.002A 820.8     Patient Active Problem List   Diagnosis    Hip fracture     History reviewed. No pertinent past medical history.  History reviewed. No pertinent surgical history.      OT ASSESSMENT FLOWSHEET (Last 12 Hours)       OT Evaluation and Treatment       Row Name 25 1044                   OT Time and Intention    Subjective Information no complaints  -LA        Document Type therapy note (daily note)  -LA        Mode of Treatment occupational therapy  -LA        Patient Effort fair  -LA           General Information    Patient Profile Reviewed yes  -LA        General Observations of Patient Patient sleeping in bed upon arrival. Patient spouse present. Spouse reported that patient slept well prevoius night and try to get patient awake for therapy. OT completed PROM of BUE however patient remainded lethargic. BUE ROM WFL. Patient unable to follow any command and dependent for bed mobility. Patient spouse reports that patient was dependent with ADLs at baseline.  -LA           Cognition    Affect/Mental Status (Cognition) low arousal/lethargic  -LA        Orientation Status (Cognition) unable/difficult to assess  -LA        Follows Commands (Cognition) does not follow one-step commands  -LA           Motor Skills    Therapeutic Exercise shoulder;elbow/forearm;wrist;hand  -LA           Wound 25 Left anterior thigh    Wound - Properties Group Placement Date: 25  -JG Placement Time: 959JG Side: Left  -JG Orientation: anterior  -JG Location: thigh  -JG    Retired Wound -  Properties Group Placement Date: 02/23/25  -JG Placement Time: 0959 -JG Side: Left  -JG Orientation: anterior  -JG Location: thigh  -JG    Retired Wound - Properties Group Placement Date: 02/23/25  -JG Placement Time: 0959 -JG Side: Left  -JG Orientation: anterior  -JG Location: thigh  -JG    Retired Wound - Properties Group Date first assessed: 02/23/25  -JG Time first assessed: 0959 -JG Side: Left  -JG Location: thigh  -JG       Plan of Care Review    Plan of Care Reviewed With patient  -LA           Positioning and Restraints    Pre-Treatment Position in bed  -LA        Post Treatment Position bed  -LA        In Bed supine;call light within reach;encouraged to call for assist;exit alarm on;with family/caregiver;with other staff  -LA                  User Key  (r) = Recorded By, (t) = Taken By, (c) = Cosigned By      Initials Name Effective Dates    Kristin Avilez RN 08/29/23 -     Radha Sue OT 02/14/22 -                      Occupational Therapy Education        No education to display                      OT Recommendation and Plan     Plan of Care Review  Plan of Care Reviewed With: patient  Plan of Care Reviewed With: patient        Time Calculation:     Therapy Charges for Today       Code Description Service Date Service Provider Modifiers Qty    [id number]  OT THER PROC EA 15 MIN 2/27/2025 Radha Pablo OT GO 1    [id number]  OT THERAPEUTIC ACT EA 15 MIN 2/27/2025 Radha Pablo OT GO 1                 Radha Pablo OT  2/27/2025

## 2025-02-27 NOTE — DISCHARGE PLACEMENT REQUEST
"Crista Connors (84 y.o. Female)       Date of Birth   1940    Social Security Number       Address   [address] [place] [id number]    Home Phone   [phone]    MRN   [id number]       [facility]    Marital Status                               Admission Date   25    Admission Type   Emergency    Admitting Provider   Heriberto Arteaga MD    Attending Provider   Deandre Hernandez MD    Department, Room/Bed   [address], 3339/       Discharge Date       Discharge Disposition       Discharge Destination                                 Attending Provider: Deandre Hernandez MD    Allergies: Sulfa Antibiotics    Isolation: None   Infection: None   Code Status: CPR    Ht: 165.1 cm (65\")   Wt: 62.2 kg (137 lb 2 oz)    Admission Cmt: None   Principal Problem: Hip fracture [S72.009A]                   Active Insurance as of 2025       Primary Coverage       Payor Plan Insurance Group Employer/Plan Group    HUMANA MEDICARE REPLACEMENT HU[employer] MEDICARE ADVANTAGE GROUP [id number]       Payor Plan Address Payor Plan Phone Number Payor Plan Fax Number Effective Dates    [address] [address] [phone]  2019 - None Entered    [facility] [id number]         Subscriber Name Subscriber Birth Date Member ID       CRISTA CONNORS 1940 [id number]                     Emergency Contacts        (Rel.) Home Phone Work Phone Mobile Phone    Donavon Connors (Spouse) [phone] -- --                 History & Physical        Aysha Cardoza APRN at 25 0306       Attestation signed by Heriberto Arteaga MD at 25 0504    I have reviewed this documentation and agree.  I have discussed and formulated the assessment and plan with ZABRINA Olmstead.                      [facility]  History & Physical    Patient Identification:  Name:  Crista Connors  Age:  84 y.o.  Sex:  female  :  1940  MRN:  [id number]   Visit Number:  [id number]  Admit " Date: 2/22/2025   Primary Care Physician:  Abby Mckenzie DO    Subjective     Chief complaint: Fall    History of presenting illness:      Crista Blunt is a 84 y.o. female with past medical history significant for severe Alzheimer's dementia, hypercholesterolemia, hypertension, GERD, osteoarthritis, depression, anxiety, and advanced age.  Patient transported to [facility] emergency department for further evaluation of left hip pain after experiencing a mechanical fall at home.  Patient does have severe dementia and was unable to provide details for HPI.  However, spouse was at bedside.  He states that he was helping patient walk to the living room from the kitchen when the patient went into a direction he was not expecting, causing patient to fall and hit her head and the left hip.  He states that he and family member had a very difficult time getting the patient up out of the floor.  He states that patient typically does have a hard time ambulating, although usually she is a x 1 assist. He states that patient has not taken any home medications in approximately 2 years. Spouse states that patient has a poor appetite and usually only eats 1 meal per day.  During my evaluation, patient was awake, calm, cooperative, and appearing in no acute distress.  She denied any pain at the time of my exam.  She was able to state her name.  She was unable to answer any other pertinent questions or orientation questions.  The left lower extremity appears neurovascularly intact.  Left lower extremity appears shortened and externally rotated.  X-ray of the left hip obtained in the ED revealed acute left femoral neck fracture without dislocation. ED provider discussed with orthopedic surgery who accepted patient in consult.  Patient will be admitted to the [facility] floor for further monitoring, evaluation, and treatment.  Discussed plans with patient's spouse who voices agreement and understanding with no  further questions or concerns at this time.    Upon arrival to the ED, vital signs were temperature 98, pulse 90, respirations 18, blood pressure 167/89, SpO2 saturation 92% on room air.  CMP with glucose 171, otherwise unremarkable.  CBC with WBCs 16.27, otherwise unremarkable.  Chest x-ray pending.  X-ray of the left hip noted acute left femoral neck fracture.  No acute dislocation of the left femoral head.  Mild osteoarthritis at the right and left hip joint.  Intact pelvis.  No acute foreign body.  X-ray of the left tib-fib unremarkable.  X-ray of the left femur noted acute left femoral neck fracture.  No acute dislocation.  CT of the head without contrast noted moderate generalized brain volume loss with mild to moderate chronic small vessel ischemic type changes in the white matter.  Ex vacuo dilatation of the ventricular system due to underlying deep white matter parenchymal volume loss.  No acute intracranial hemorrhage, mass, infarct, or edema.  No scalp hematoma.  CT of the cervical spine without contrast unremarkable.  No acute fracture or dislocation.  No apical pneumothorax.    Known Emergency Department medications received prior to my evaluation included N/A (no medications given while in ED). Emergency Department Room location at the time of my evaluation was [facility]. Discussed with admitting physician, Heriberto Thornton MD.      ---------------------------------------------------------------------------------------------------------------------   Review of Systems   Unable to perform ROS: Dementia     ---------------------------------------------------------------------------------------------------------------------   History reviewed. No pertinent past medical history.  History reviewed. No pertinent surgical history.  History reviewed. No pertinent family history.  Social History     Socioeconomic History    Marital status:       ---------------------------------------------------------------------------------------------------------------------   Allergies:  Sulfa antibiotics  ---------------------------------------------------------------------------------------------------------------------   Home medications:    Medications below are reported home medications pulling from within the system; at this time, these medications have not been reconciled unless otherwise specified and are in the verification process for further verifcation as current home medications.  (Not in a hospital admission)      Hospital Scheduled Meds:  Morphine, 2 mg, Intravenous, Once  ondansetron, 4 mg, Intravenous, Once           Current listed hospital scheduled medications may not yet reflect those currently placed in orders that are signed and held awaiting patient's arrival to floor.   ---------------------------------------------------------------------------------------------------------------------     Objective     Vital Signs:  Temp:  [98 °F (36.7 °C)] 98 °F (36.7 °C)  Heart Rate:  [] 100  Resp:  [18] 18  BP: (140-167)/(87-89) 140/87      02/22/25 2023   Weight: 59 kg (130 lb 1.1 oz)     Body mass index is 21.64 kg/m².  ---------------------------------------------------------------------------------------------------------------------       Physical Exam  Vitals reviewed.   Constitutional:       General: She is awake. She is not in acute distress.     Appearance: She is ill-appearing (chronically). She is not diaphoretic.   HENT:      Head: Normocephalic and atraumatic.      Mouth/Throat:      Mouth: Mucous membranes are dry.   Eyes:      Extraocular Movements: Extraocular movements intact.      Pupils: Pupils are equal, round, and reactive to light.   Cardiovascular:      Rate and Rhythm: Normal rate and regular rhythm.      Pulses: Normal pulses.           Dorsalis pedis pulses are 2+ on the right side and 2+ on the left side.      Heart  sounds: Normal heart sounds. No murmur heard.     No friction rub.   Pulmonary:      Effort: Pulmonary effort is normal. No accessory muscle usage, respiratory distress or retractions.      Breath sounds: No wheezing, rhonchi or rales.   Abdominal:      General: Abdomen is flat. Bowel sounds are normal. There is no distension.      Palpations: Abdomen is soft.      Tenderness: There is no abdominal tenderness. There is no guarding.   Musculoskeletal:      Cervical back: Neck supple. No rigidity.      Right lower leg: No edema.      Left lower leg: No edema.      Comments: LLE appears shortened and externally rotated. Appearing neurovascularly intact upon my exam.    Skin:     General: Skin is warm and dry.      Capillary Refill: Capillary refill takes 2 to 3 seconds.      Coloration: Skin is pale.   Neurological:      Mental Status: She is alert. Mental status is at baseline. She is disoriented.      Sensory: Sensation is intact.      Motor: Weakness (generalized) present. No tremor.   Psychiatric:         Attention and Perception: She is inattentive.         Mood and Affect: Mood is not anxious.         Behavior: Behavior is not agitated, aggressive or combative. Behavior is cooperative.         Cognition and Memory: Cognition is impaired. Memory is impaired.       ---------------------------------------------------------------------------------------------------------------------  EKG: Performed in ED during my evaluation. Revealed normal sinus rhythm/sinus tachycardia 100 without evidence of acute ischemia. Image upload pending.     Telemetry:  Patient not on continuous cardiac monitoring in [facility] Bed 101 in ED.   ---------------------------------------------------------------------------------------------------------------------   Results from last 7 days   Lab Units 02/23/25  0155   WBC 10*3/mm3 16.27*   HEMOGLOBIN g/dL 14.2   HEMATOCRIT % 43.7   MCV fL 95.2   MCHC g/dL 32.5   PLATELETS 10*3/mm3 258        "  Results from last 7 days   Lab Units 02/23/25  0155   SODIUM mmol/L 136   POTASSIUM mmol/L 3.8   CHLORIDE mmol/L 101   CO2 mmol/L 23.9   BUN mg/dL 14   CREATININE mg/dL 0.81   CALCIUM mg/dL 9.1   GLUCOSE mg/dL 171*   ALBUMIN g/dL 4.1   BILIRUBIN mg/dL 0.4   ALK PHOS U/L 100   AST (SGOT) U/L 16   ALT (SGPT) U/L 12   Estimated Creatinine Clearance: 49 mL/min (by C-G formula based on SCr of 0.81 mg/dL).  No results found for: \"AMMONIA\"          No results found for: \"HGBA1C\"  Lab Results   Component Value Date    TSH 2.420 04/04/2024     No results found for: \"PREGTESTUR\", \"PREGSERUM\", \"HCG\", \"HCGQUANT\"  Pain Management Panel           No data to display                  ---------------------------------------------------------------------------------------------------------------------  Imaging Results (Last 7 Days)       Procedure Component Value Units Date/Time    XR Chest 1 View [[id number]] Resulted: 02/23/25 0204     Updated: 02/23/25 0223    XR Tibia Fibula 2 View Left [[id number]] Collected: 02/23/25 0019     Updated: 02/23/25 0022    Narrative:      PROCEDURE: X-ray examination of the left tibia and fibula performed on  February 22, 2025. 3 views.     HISTORY: Fall. Leg pain.     COMPARISON: None.     FINDINGS:     Intact left tibia and fibula with no acute fracture or dislocation.  No lytic or blastic lesion. No foreign body.  No tibiotalar joint effusion.  No soft tissue swelling.       Impression:         Intact left tibia and fibula with no acute fracture or dislocation.  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  Mild osteoarthritis at the left tibiotalar joint.  No foreign body.           This report was finalized on 2/23/2025 12:20 AM by Eamon Omalley MD.       XR Femur 2 View Left [[id number]] Collected: 02/23/25 0018     Updated: 02/23/25 0021    Narrative:      PROCEDURE: X-ray examination of the left femur performed on February 22, 2025. 4 films.     HISTORY: Left leg pain. Hip pain. Fall.   "   COMPARISON: None.     FINDINGS:     Acute left femoral neck fracture.  No acute dislocation of the left femoral head.  Mild osteoarthritis at the left hip joint  Mild osteoarthritis at the left knee.  No acute foreign body.       Impression:         Acute left femoral neck fracture.  No acute dislocation.  Osteoarthritis.     This report was finalized on 2/23/2025 12:19 AM by Eamon Omalley MD.       XR Hip With or Without Pelvis 2 - 3 View Left [[id number]] Collected: 02/23/25 0016     Updated: 02/23/25 0020    Narrative:      PROCEDURE: X-ray examination of the pelvis and left hip performed on  February 22, 2025. 3 views.     HISTORY: Fall. Pain.     COMPARISON: None.     FINDINGS:     Intact pelvis  Intact SI joints and intact pubic symphysis.  Mild osteoarthritis at the right and left hip joint.  Degenerative disc disease in the lower lumbar spine.  Acute left femoral neck fracture.   No acute dislocation of the left femoral head.       Impression:      Impression:     Acute left femoral neck fracture.   No acute dislocation of the left femoral head.  Mild osteoarthritis at the right and left hip joint.  Intact pelvis  No acute foreign body.     This report was finalized on 2/23/2025 12:18 AM by Eamon Omalley MD.       CT Cervical Spine Without Contrast [[id number]] Collected: 02/23/25 0015     Updated: 02/23/25 0018    Narrative:      PROCEDURE: CT of the cervical spine performed without IV contrast on  February 22, 2025. The examination was performed with 2 mm axial imaging  and 2 mm sagittal coronal reconstruction images. Examination was  performed according to as low as reasonably achievable dose protocol.     HISTORY: Fall. Head injury. Neck injury.     COMPARISON: None.     FINDINGS:     Anatomic alignment of the cervical vertebral bodies with no acute  fracture or dislocation.  Normal thickness of the prevertebral soft tissues  No lytic or blastic lesion.       Impression:         No acute fracture or  dislocation.   No apical pneumothorax.     This report was finalized on 2/23/2025 12:16 AM by Eamon Omalley MD.       CT Head Without Contrast [[id number]] Collected: 02/23/25 0012     Updated: 02/23/25 0017    Narrative:      PROCEDURE: CT brain performed without IV contrast on February 22, 2025.  The examination was performed with 3 mm axial imaging and 3 mm sagittal  and coronal reconstruction images. The examination was performed  according to as low as reasonably achievable dose protocol.     HISTORY: Fall. Head injury. Neck injury.     COMPARISON: None.     FINDINGS:     Moderate generalized brain volume loss  Mild to moderate chronic small vessel ischemic type changes in the white  matter.  Deep white matter parenchymal volume loss with associated ex vacuo  dilatation of the lateral ventricles and basal cisterns.  No acute intracranial hemorrhage, mass, infarct, or edema.  Mild mucosal thickening in the ethmoid air cells.  High jugular bulb noted on the right side.  Debris noted within the right and left external auditory canal, right  greater than left.  Left side mastoiditis  Minimal mucosal thickening in the ethmoid air cells  No fracture.  No scalp hematoma.       Impression:      Impression:     Moderate generalized brain volume loss with mild to moderate chronic  small vessel ischemic type changes in the white matter.  Ex-vacuo dilatation of the ventricular system due to underlying deep  white matter parenchymal volume loss  No acute intracranial hemorrhage, mass, infarct, or edema.  Minimal mucosal thickening in the ethmoid air cells  Partial opacification of the left mastoid air cells.  No fracture or dislocation.  No scalp hematoma.     This report was finalized on 2/23/2025 12:15 AM by Eamon Omalley MD.               Last echocardiogram:  No previous echo on file.     I have personally reviewed the above radiology images and read the final radiology report on  02/23/25  ---------------------------------------------------------------------------------------------------------------------  Assessment / Plan     Active Hospital Problems    Diagnosis  POA    **Hip fracture [S72.009A]  Yes       ASSESSMENT/PLAN:  #Acute non-displaced left femoral neck fracture s/p mechanical fall at home prior to arrival   #Leukocytosis (POA), likely reactive   #Severe Alzheimer's dementia  #Hypercholesterolemia  #Hypertension  #GERD  #Osteoarthritis  #Depression and anxiety  #Advanced age  -Radiological images reviewed.   -Orthopedic surgery consulted, input/assistance is much appreciated.   -PRN IV/PO pain medication available with holding parameters to prevent hypotension, oversedation, and/or respiratory depression.   -Continuous pulse oximetry ordered.   -Neurovascular checks Q 4 hours.   -NPO pending surgical evaluation/intervention.  -Strict Bedrest.   -Fall precautions.   -May utilize external urinary catheter.   -No home medications to review as patient reportedly has been off all medications for the past 2 years. Spouse states that he does occasionally give her a THC gummy.   -Provide reorientation/redirection as necessary.   -Utilize bed alarm.  -PT/OT consults for postoperative evaluation.   -Case management consulted for assistance with discharge plans.   -VS currently stable; continue to closely monitor VS per hospital policy.   -Repeat labs in the AM (CBC and CMP). Obtain PT-INR.   -Preop EKG ordered with no concern for acute ischemia; revealed NSR/.  -Preop COVID-19 and Flu swab ordered, pending.      ----------  -DVT prophylaxis: SCD (Right leg ONLY).   -Activity: Bedrest.   -Expected length of stay:   INPATIENT status due to the need for care which can only be reasonably provided in an hospital setting such as aggressive/expedited ancillary services and/or consultation services, the necessity for IV medications, close physician monitoring and/or the possible need for  procedures.  In such, I feel patient's risk for adverse outcomes and need for care warrant INPATIENT evaluation and predict the patient's care encounter to likely last beyond 2 midnights.   -Disposition pending clinical course.     High risk secondary to acute non-displaced left femoral neck fracture status post mechanical fall at home prior to arrival, severe Alzheimer's dementia, and advanced age.     CODE STATUS: DNR/DNI, discussed with spouse at bedside in ED (H101).       [place] Nani, APRN   02/23/25  03:27 EST  Pager #[phone]  ---------------------------------------------------------------------------------------------------------------------        Electronically signed by Heriberto Arteaga MD at 02/23/25 0504       Vital Signs (last day)       Date/Time Temp Temp src Pulse Resp BP Patient Position SpO2    02/27/25 1000 97.3 (36.3) Oral -- 18 123/64 Lying --    02/27/25 0600 98.6 (37) Oral -- 18 102/50 Lying --    02/27/25 0319 97.2 (36.2) Oral 93 20 121/59 Lying 95    02/26/25 2311 98.6 (37) Oral -- 16 130/70 Lying 95    02/26/25 1849 98.6 (37) Oral 108 16 109/50 Lying --    02/26/25 1400 97.5 (36.4) Oral -- 18 111/53 Lying --    02/26/25 1000 99 (37.2) Oral -- 18 113/58 Lying --    02/26/25 0600 98.5 (36.9) Oral -- 18 131/64 Lying --    02/26/25 0300 98.9 (37.2) Oral 93 18 130/56 Lying --          Lines, Drains & Airways       Active LDAs       Name Placement date Placement time Site Days    Peripheral IV 02/23/25 0350 Anterior;Left;Proximal Forearm 02/23/25  0350  Forearm  4    External Urinary Catheter 02/27/25  --  --  less than 1                  Current Facility-Administered Medications   Medication Dose Route Frequency Provider Last Rate Last Admin    acetaminophen (TYLENOL) tablet 650 mg  650 mg Oral Q4H PRN Perry Cobb MD        Or    acetaminophen (TYLENOL) 160 MG/5ML oral solution 650 mg  650 mg Oral Q4H PRN Perry Cobb MD   650 mg at 02/25/25 0351    Or    acetaminophen (TYLENOL)  suppository 650 mg  650 mg Rectal Q4H PRN Perry Cobb MD   650 mg at 02/23/25 1338    azithromycin (ZITHROMAX) tablet 250 mg  250 mg Oral Daily Deandre Hernandez MD   250 mg at 02/27/25 0844    sennosides-docusate (PERICOLACE) 8.6-50 MG per tablet 2 tablet  2 tablet Oral BID PRN Perry Cobb MD        And    polyethylene glycol (MIRALAX) packet 17 g  17 g Oral Daily PRN Perry Cobb MD   17 g at 02/27/25 0844    And    bisacodyl (DULCOLAX) EC tablet 5 mg  5 mg Oral Daily PRN Perry Cobb MD        And    bisacodyl (DULCOLAX) suppository 10 mg  10 mg Rectal Daily PRN Perry Cobb MD        Calcium Replacement - Follow Nurse / BPA Driven Protocol   Not Applicable PRN Deandre Hernandez MD        cefdinir (OMNICEF) capsule 300 mg  300 mg Oral Q12H Deandre Hernandez MD   300 mg at 02/27/25 0844    Enoxaparin Sodium (LOVENOX) syringe 40 mg  40 mg Subcutaneous Q24H Perry Cobb MD   40 mg at 02/27/25 0844    HYDROcodone-acetaminophen (NORCO) 5-325 MG per tablet 1 tablet  1 tablet Oral Q6H PRN Deandre Hernandez MD        Magnesium Standard Dose Replacement - Follow Nurse / BPA Driven Protocol   Not Applicable PRDeandre Clancy MD        Phosphorus Replacement - Follow Nurse / BPA Driven Protocol   Not Applicable PRN Deandre Hernandez MD        Potassium Replacement - Follow Nurse / BPA Driven Protocol   Not Applicable PRDeandre Clancy MD        ropivacaine 0.5 % 250 mg, Morphine 10 mg, cloNIDine 80 mcg, ketorolac 30 mg, EPINEPHrine 1 mg/mL 0.5 mg in sodium chloride 0.9 % 50 mL solution   Intra-articular Once Perry Cobb MD        sodium chloride 0.9 % flush 10 mL  10 mL Intravenous PRN Perry Cobb MD        sodium chloride 0.9 % flush 10 mL  10 mL Intravenous Q12H Perry Cobb MD   10 mL at 02/27/25 0844    sodium chloride 0.9 % flush 10 mL  10 mL Intravenous PRN Perry Cobb MD        sodium chloride 0.9 % infusion 40 mL  40 mL Intravenous PRN Perry Cobb MD            Operative/Procedure Notes (most recent note)         Perry Cobb MD at 02/23/25 0937          Crista Jose Raul  2/23/2025      Operative Note:    Surgeon: Perry Cobb MD    Assistant: MODESTA Ng    Preoperative Diagnosis:   Left femoral neck fracture, displaced    Postoperative Diagnosis:   Same    Procedure(s):    1.  Left hip hemiarthroplasty for fracture, CPT code 21795    Anesthesia: General, local injection    Estimated Blood Loss: 100 cc    Specimen Obtained:  None    Complication(s):  None apparent.     Implants:  Depuy Epyphyses stem 3, standard offsett, 0 neck, 47 endo head     Operative Indication:     The patient is a 84-year-old who fell from standing height sustained the above injury.  Due to her ambulatory status and acute nature of her injury she elected to proceed with the above operation.  Risk benefits alternatives and complications was discussed with the patient prior to surgery.  This was also discussed with her family.    Operative Details:    The patient was met in the preoperative suite where the correct operative site was marked. The patient was brought to the operating room where anesthesia was initiated. The patient was positioned appropriately with all bony prominences well-padded. The patient was prepped and draped in the usual sterile fashion and prior to incision a time out was observed to verify the correct operative site, procedure and antibiotics.    A longitudinal incision was taken on the lateral aspect of the proximal femur. This was taken down to the IT band. Hemostasis was achieved with electrocautery. The IT band was incised in line with the incision. The abductors were then released on the anterior two-thirds and tagged for later repair. Hohmann retractors were placed around the capsule and an H capsulotomy was performed. The leg was then externally rotated and the foot was placed on the side of the table.     An oscillating saw was utilized to create a femoral neck osteotomy and the bone was removed. Attention was brought  back to the acetabulum where a corkscrew was used to remove the femoral head. The femoral head was then sized.    Attention was brought back to the proximal femur where sequential broaching was performed until an appropriate fit. A trial implant was then placed. No subluxation was noted with passive range of motion and a minimal shuck was noted. Limb lengths appeared equal. Trial implants were removed.     The canal was then prepped and irrigated. The stem was cemented into position and the final implant impacted into position. The final implants were reduced which showed a similar exam compared to the trial implants.     The wound was irrigated thoroughly with saline. The capsule was repaired along with the abductors and IT band.The remainder of the wound was closed in standard layered fashion and a soft dressing was applied. The patient was extubated, transferred to the hospital bed and into PACU in stable condition. The patient tolerated the procedure well without any complications.     Postoperative Plan:    Transfer to floor  Dressing- maintain dressing for now, ok to reinforce prn saturation  Weightbearing/Lifting Status- as tolerated, anterior hip precautions, no active abduction   DVT PPx-Lovenox 40 once daily for 14 days  Follow up-2 weeks to the office at discharge    Electronically Signed by: Perry Cbob MD              Electronically signed by Perry Cobb MD at 02/23/25 1058          Physician Progress Notes (most recent note)        Ivette Gandhi, APRN at 02/26/25 1036       Attestation signed by Perry Cobb MD at 02/26/25 1922    I have reviewed this documentation and agree.                       LOS: 3 days     Chief Complaint: Left hip fracture    Subjective     Interval History:   Postoperative day 3 left hip hemiarthroplasty.  Patient asleep in bed, eyes closed.  NAD.  Spouse at bedside.  Providing information.  No acute events reported overnight.  Patient was able to sit on edge of  bed yesterday and stand with physical therapy assistance.  Patient currently on antibiotics secondary to UTI.      Objective     Vital Signs  Temp:  [98.5 °F (36.9 °C)-98.9 °F (37.2 °C)] 98.5 °F (36.9 °C)  Heart Rate:  [] 93  Resp:  [18] 18  BP: (117-131)/(54-64) 131/64    Labs & Rads  Lab Results (last 72 hours)       Procedure Component Value Units Date/Time    Blood Culture - Blood, Arm, Left [[id number]]  (Normal) Collected: 02/24/25 1441    Specimen: Blood from Arm, Left Updated: 02/25/25 1501     Blood Culture No growth at 24 hours    Blood Culture - Blood, Arm, Left [[id number]]  (Normal) Collected: 02/24/25 1403    Specimen: Blood from Arm, Left Updated: 02/25/25 1415     Blood Culture No growth at 24 hours    Urine Culture - Urine, Urine, Clean Catch [[id number]]  (Abnormal)  (Susceptibility) Collected: 02/23/25 0537    Specimen: Urine, Clean Catch Updated: 02/25/25 1035     Urine Culture >100,000 CFU/mL Escherichia coli    Narrative:      Colonization of the urinary tract without infection is common. Treatment is discouraged unless the patient is symptomatic, pregnant, or undergoing an invasive urologic procedure.    Susceptibility        Escherichia coli      JANICE      Amoxicillin + Clavulanate Susceptible      Ampicillin Susceptible      Ampicillin + Sulbactam Susceptible      Cefazolin (Urine) Susceptible      Cefepime Susceptible      Ceftazidime Susceptible      Ceftriaxone Susceptible      Gentamicin Susceptible      Levofloxacin Susceptible      Nitrofurantoin Susceptible      Piperacillin + Tazobactam Susceptible      Trimethoprim + Sulfamethoxazole Susceptible                           Calcium, Ionized [[id number]]  (Abnormal) Collected: 02/25/25 0752    Specimen: Blood Updated: 02/25/25 0813     Ionized Calcium 1.08 mmol/L     Basic Metabolic Panel [[id number]]  (Abnormal) Collected: 02/25/25 0239    Specimen: Blood Updated: 02/25/25 0348     Glucose 133 mg/dL      BUN 12 mg/dL      Creatinine  0.78 mg/dL      Sodium 136 mmol/L      Potassium 3.7 mmol/L      Chloride 105 mmol/L      CO2 23.2 mmol/L      Calcium 8.0 mg/dL      BUN/Creatinine Ratio 15.4     Anion Gap 7.8 mmol/L      eGFR 75.5 mL/min/1.73     Narrative:      GFR Categories in Chronic Kidney Disease (CKD)      GFR Category          GFR (mL/min/1.73)    Interpretation  G1                     90 or greater         Normal or high (1)  G2                      60-89                Mild decrease (1)  G3a                   45-59                Mild to moderate decrease  G3b                   30-44                Moderate to severe decrease  G4                    15-29                Severe decrease  G5                    14 or less           Kidney failure          (1)In the absence of evidence of kidney disease, neither GFR category G1 or G2 fulfill the criteria for CKD.    eGFR calculation 2021 CKD-EPI creatinine equation, which does not include race as a factor    CBC & Differential [[id number]]  (Abnormal) Collected: 02/25/25 0239    Specimen: Blood Updated: 02/25/25 0318    Narrative:      The following orders were created for panel order CBC & Differential.  Procedure                               Abnormality         Status                     ---------                               -----------         ------                     CBC Auto Differential[[id number]]        Abnormal            Final result                 Please view results for these tests on the individual orders.    CBC Auto Differential [[id number]]  (Abnormal) Collected: 02/25/25 0239    Specimen: Blood Updated: 02/25/25 0318     WBC 12.69 10*3/mm3      RBC 3.41 10*6/mm3      Hemoglobin 10.7 g/dL      Hematocrit 33.4 %      MCV 97.9 fL      MCH 31.4 pg      MCHC 32.0 g/dL      RDW 12.9 %      RDW-SD 46.0 fl      MPV 9.0 fL      Platelets 203 10*3/mm3      Neutrophil % 81.8 %      Lymphocyte % 10.4 %      Monocyte % 5.8 %      Eosinophil % 1.1 %      Basophil % 0.4 %      Immature  "Grans % 0.5 %      Neutrophils, Absolute 10.38 10*3/mm3      Lymphocytes, Absolute 1.32 10*3/mm3      Monocytes, Absolute 0.74 10*3/mm3      Eosinophils, Absolute 0.14 10*3/mm3      Basophils, Absolute 0.05 10*3/mm3      Immature Grans, Absolute 0.06 10*3/mm3      nRBC 0.0 /100 WBC     Procalcitonin [[id number]]  (Abnormal) Collected: 02/24/25 1004    Specimen: Blood Updated: 02/24/25 1412     Procalcitonin 1.31 ng/mL     Narrative:      As a Marker for Sepsis (Non-Neonates):    1. <0.5 ng/mL represents a low risk of severe sepsis and/or septic shock.  2. >2 ng/mL represents a high risk of severe sepsis and/or septic shock.    As a Marker for Lower Respiratory Tract Infections that require antibiotic therapy:    PCT on Admission    Antibiotic Therapy       6-12 Hrs later    >0.5                Strongly Recommended  >0.25 - <0.5        Recommended   0.1 - 0.25          Discouraged              Remeasure/reassess PCT  <0.1                Strongly Discouraged     Remeasure/reassess PCT    As 28 day mortality risk marker: \"Change in Procalcitonin Result\" (>80% or <=80%) if Day 0 (or Day 1) and Day 4 values are available. Refer to http://www.[employer]-pct-calculator.com    Change in PCT <=80%  A decrease of PCT levels below or equal to 80% defines a positive change in PCT test result representing a higher risk for 28-day all-cause mortality of patients diagnosed with severe sepsis for septic shock.    Change in PCT >80%  A decrease of PCT levels of more than 80% defines a negative change in PCT result representing a lower risk for 28-day all-cause mortality of patients diagnosed with severe sepsis or septic shock.       Manual Differential [[id number]]  (Abnormal) Collected: 02/23/25 1536    Specimen: Blood Updated: 02/23/25 1800     Neutrophil % 86.0 %      Lymphocyte % 7.0 %      Monocyte % 5.0 %      Basophil % 1.0 %      Myelocyte % 1.0 %      Neutrophils Absolute 14.51 10*3/mm3      Lymphocytes Absolute 1.18 10*3/mm3     "  Monocytes Absolute 0.84 10*3/mm3      Basophils Absolute 0.17 10*3/mm3      RBC Morphology Normal     Platelet Estimate Adequate    Respiratory Panel PCR w/COVID-19(SARS-CoV-2) [facility]/COR/DINO In-House, NP Swab in UTM/VTM, 2 HR TAT - Swab, Nasopharynx [[id number]]  (Normal) Collected: 02/23/25 1644    Specimen: Swab from Nasopharynx Updated: 02/23/25 1750     ADENOVIRUS, PCR Not Detected     Coronavirus 229E Not Detected     Coronavirus HKU1 Not Detected     Coronavirus NL63 Not Detected     Coronavirus OC43 Not Detected     COVID19 Not Detected     Human Metapneumovirus Not Detected     Human Rhinovirus/Enterovirus Not Detected     Influenza A PCR Not Detected     Influenza B PCR Not Detected     Parainfluenza Virus 1 Not Detected     Parainfluenza Virus 2 Not Detected     Parainfluenza Virus 3 Not Detected     Parainfluenza Virus 4 Not Detected     RSV, PCR Not Detected     Bordetella pertussis pcr Not Detected     Bordetella parapertussis PCR Not Detected     Chlamydophila pneumoniae PCR Not Detected     Mycoplasma pneumo by PCR Not Detected    Narrative:      In the setting of a positive respiratory panel with a viral infection PLUS a negative procalcitonin without other underlying concern for bacterial infection, consider observing off antibiotics or discontinuation of antibiotics and continue supportive care. If the respiratory panel is positive for atypical bacterial infection (Bordetella pertussis, Chlamydophila pneumoniae, or Mycoplasma pneumoniae), consider antibiotic de-escalation to target atypical bacterial infection.    Comprehensive Metabolic Panel [[id number]]  (Abnormal) Collected: 02/23/25 1536    Specimen: Blood Updated: 02/23/25 1714     Glucose 118 mg/dL      BUN 12 mg/dL      Creatinine 0.81 mg/dL      Sodium 137 mmol/L      Potassium 4.3 mmol/L      Comment: Slight hemolysis detected by analyzer. Result may be falsely elevated.        Chloride 103 mmol/L      CO2 22.9 mmol/L       Calcium 7.9 mg/dL      Total Protein 5.0 g/dL      Albumin 3.3 g/dL      ALT (SGPT) 11 U/L      AST (SGOT) 18 U/L      Alkaline Phosphatase 70 U/L      Total Bilirubin 0.4 mg/dL      Globulin 1.7 gm/dL      A/G Ratio 1.9 g/dL      BUN/Creatinine Ratio 14.8     Anion Gap 11.1 mmol/L      eGFR 72.1 mL/min/1.73     Narrative:      GFR Categories in Chronic Kidney Disease (CKD)      GFR Category          GFR (mL/min/1.73)    Interpretation  G1                     90 or greater         Normal or high (1)  G2                      60-89                Mild decrease (1)  G3a                   45-59                Mild to moderate decrease  G3b                   30-44                Moderate to severe decrease  G4                    15-29                Severe decrease  G5                    14 or less           Kidney failure          (1)In the absence of evidence of kidney disease, neither GFR category G1 or G2 fulfill the criteria for CKD.    eGFR calculation 2021 CKD-EPI creatinine equation, which does not include race as a factor    CBC & Differential [[id number]]  (Abnormal) Collected: 02/23/25 1536    Specimen: Blood Updated: 02/23/25 1659    Narrative:      The following orders were created for panel order CBC & Differential.  Procedure                               Abnormality         Status                     ---------                               -----------         ------                     CBC Auto Differential[[id number]]        Abnormal            Final result               Scan Slide[[id number]]                                                                    Please view results for these tests on the individual orders.    CBC Auto Differential [[id number]]  (Abnormal) Collected: 02/23/25 1536    Specimen: Blood Updated: 02/23/25 1659     WBC 16.87 10*3/mm3      RBC 3.57 10*6/mm3      Hemoglobin 11.4 g/dL      Hematocrit 34.6 %      MCV 96.9 fL      MCH 31.9 pg      MCHC 32.9 g/dL      RDW 13.1 %      RDW-SD  47.0 fl      MPV 9.6 fL      Platelets 190 10*3/mm3     Blood Gas, Arterial With Co-Ox [[id number]]  (Abnormal) Collected: 02/23/25 1602    Specimen: Arterial Blood Updated: 02/23/25 1603     Site Right Radial     Samy's Test Positive     pH, Arterial 7.397 pH units      pCO2, Arterial 40.0 mm Hg      pO2, Arterial 55.8 mm Hg      Comment: 84 Value below reference range        HCO3, Arterial 24.6 mmol/L      Base Excess, Arterial -0.2 mmol/L      O2 Saturation, Arterial 91.4 %      Comment: 84 Value below reference range        Hemoglobin, Blood Gas 10.7 g/dL      Comment: 84 Value below reference range        Hematocrit, Blood Gas 32.8 %      Comment: 84 Value below reference range        Oxyhemoglobin 89.0 %      Comment: 84 Value below reference range        Methemoglobin 0.50 %      Carboxyhemoglobin 2.1 %      A-a DO2 42.1 mmHg      CO2 Content 25.8 mmol/L      Barometric Pressure for Blood Gas 728 mmHg      Modality Room Air     FIO2 21 %      Ventilator Mode NA     Collected by [id number]     Comment: Meter: [id number]     :  [id number]        pH, Temp Corrected --     pCO2, Temperature Corrected --     pO2, Temperature Corrected --          Imaging Results (Last 72 Hours)       Procedure Component Value Units Date/Time    XR Pelvis 1 or 2 View [[id number]] Collected: 02/24/25 0829     Updated: 02/24/25 0832    Narrative:      EXAM:    XR Pelvis, 1 or 2 Views     EXAM DATE:    2/23/2025 11:03 AM     CLINICAL HISTORY:    s/p left hip lexi, in pacu; S72.002A Fracture of unspecified part of  neck of left femur, initial encounter for closed fracture; S72.002A  Fracture of unspecified part of neck of left femur, initial encounter  for closed fracture     TECHNIQUE:    Frontal view of the pelvis.     COMPARISON:    2/22/2025     FINDINGS:    BONES/JOINTS:  Left total hip arthroplasty.  Components appear well  seated.  No acute fracture.  No dislocation.    SOFT TISSUES:  Unremarkable as visualized.        Impression:        Left total hip arthroplasty. Components appear well seated.     This report was finalized on 2/24/2025 8:30 AM by Dr. Bradley Rausch MD.       XR Chest 1 View [[id number]] Collected: 02/23/25 1938     Updated: 02/1941    Narrative:      PROCEDURE: X-ray examination of the chest performed on February 23, 2025. Single view. Upright position.     HISTORY: Hypoxia.     COMPARISON: None.     FINDINGS:     Mild enlarged heart size  Coarsened bronchovascular pattern to the lungs  Mild atelectasis at the lung bases  Left lower lobe pneumonia.  No pleural effusion or pneumothorax.  Mild dextroconvex curve at the mid thoracic spine.  Azygos lobe variant in the right upper lobe  No lytic or blastic lesion.  No free air in the upper abdomen.       Impression:         Mild enlarged heart size.  Coarsened bronchovascular pattern to the lungs  Possible left lower lobe pneumonia.  Azygos lobe variant in the right upper lobe  No fracture or dislocation.     This report was finalized on 2/23/2025 7:39 PM by Eamon Omalley MD.               Physical Exam:   Physical Exam  Vitals reviewed.   Constitutional:       General: She is not in acute distress.     Comments: Patient currently sleeping, eyes closed.  NAD.  Family member at bedside.   HENT:      Head: Normocephalic.   Cardiovascular:      Pulses: Normal pulses.   Pulmonary:      Effort: Pulmonary effort is normal. No respiratory distress.   Musculoskeletal:      Comments: Dressing CDI left hip.  Thigh supple.  No sign of hematoma.+PP   Skin:     General: Skin is warm and dry.   Neurological:      Mental Status: Mental status is at baseline.   Psychiatric:         Behavior: Behavior normal.          Results Review:     I reviewed the patient's new clinical results.      Assessment & Plan     Principal Problem:    Hip fracture    Postoperative day 3 left hip hemiarthroplasty secondary to femoral neck fracture    PT/OT: WBAT with assistive device, anterior hip  precautions, no active abduction.    Wound care: Maintain dressing at this time may reinforce as needed for saturation.    DVT/PPx: Lovenox 40 mg subcu daily x 14 days.    Make follow-up appointment orthopedic office in 2 weeks upon discharge.    Hospitalist attending, providing medical management.      Plan for disposition: TBD hospitalist.     HUBERT Grover  02/26/25  10:37 EST          Electronically signed by Perry Cobb MD at 02/26/25 1922          Consult Notes (most recent note)        Perry Cobb MD at 02/23/25 0906        Consult Orders    1. Inpatient Orthopedic Surgery Consult [[id number]] ordered by Aysha Cardoza APRN at 02/23/25 0153                 Consult Note    Reason for Consultation: Left hip fracture    Chief complaint left hip pain x 1 day    History of present illness: History of Present Illness    The patient is an 84-year-old who has left hip pain after a fall from standing height.  She was ambulating in her kitchen last night with her significant other tripped and fell onto her left side.  She has left hip pain.  She does ambulate with a walker and a cane and with assistance by her significant other.  The significant other reports significant pain but pain has improved since IV pain Bacid.  She has no previous history of a surgery or pain to the left hip.    The patient was minimally verbal during this encounter.  Majority of the history was obtained from the nursing staff and the significant other.      Review of Systems  A complete review of systems was completed and was negative except for what is noted in the HPI.    History  History reviewed. No pertinent past medical history., History reviewed. No pertinent surgical history., History reviewed. No pertinent family history.,   Social History     Tobacco Use    Smoking status: Never     Passive exposure: Past    Smokeless tobacco: Never   Vaping Use    Vaping status: Never Used   Substance Use Topics    Alcohol use:  Never    Drug use: Never   ,   No medications prior to admission.   , Scheduled Meds:  cefTRIAXone, 1,000 mg, Intravenous, Q24H  lactated ringers, 125 mL/hr, Intravenous, Once  [Transfer Hold] ropivacaine 0.5 % 250 mg, Morphine 10 mg, cloNIDine 80 mcg, ketorolac 30 mg, EPINEPHrine 1 mg/mL 0.5 mg in sodium chloride 0.9 % 50 mL solution, , Intra-articular, Once  sodium chloride, 10 mL, Intravenous, Q12H  sodium chloride, 10 mL, Intravenous, Q12H    , Continuous Infusions:   , PRN Meds:    acetaminophen **OR** acetaminophen **OR** acetaminophen    senna-docusate sodium **AND** polyethylene glycol **AND** bisacodyl **AND** bisacodyl    HYDROcodone-acetaminophen    midazolam    Morphine **AND** naloxone    [COMPLETED] Insert Peripheral IV **AND** sodium chloride    sodium chloride    sodium chloride    sodium chloride    sodium chloride and Allergies:  Sulfa antibiotics    Objective     Vital Signs   Temp:  [97.8 °F (36.6 °C)-98.6 °F (37 °C)] 97.8 °F (36.6 °C)  Heart Rate:  [] 98  Resp:  [17-18] 17  BP: (115-167)/(54-89) 132/64    Physical Exam:  Constitutional:  Alert. Well developed and well nourished. No acute distress.      HENT:  Head: Normocephalic and atraumatic.  Mouth:  Moist mucous membranes.    Eyes:  Conjunctivae and EOM are normal.  No scleral icterus.  Neck:  Neck supple.  No JVD present.   Cardiovascular:  +2 radial, +2 dorsalis pedis, +2 posterior tibialis bilaterally   Pulmonary/Chest:  No respiratory distress. Adequate volumes noted.   Abdominal:  Soft.  No distension and no tenderness.   Musculoskeletal:     Spine- No c/t/l/s spine tenderness, Cervical ROM without pain   RUE- Painless ROM shoulder/elbow/wrist/fingers, no edema, no ecchymosis, no gross deformity, no open injury   LUE- Painless ROM shoulder/elbow/wrist/fingers, no edema, no ecchymosis, no gross deformity, no open injury   RLE- Painless ROM hip/knee/ankle, no edema, no ecchymosis, no gross deformity, no open injury   LLE-shortened  externally rotated, positive logroll and axial load   Pelvis- negative pelvic rock   Neurological: SILT Ax/LABC/m/r/u, able to perform shoulder abduction/elbow flex-ext/wrist flex-ext/finger-thumb flex-ext-abd-add, SILT s/s/dp/sp/t, able to perform ankle DF/PF/EHL  Skin:  Skin is warm and dry.  No rash noted.  No pallor.     Labs:    Lab Results (last 72 hours)       Procedure Component Value Units Date/Time    Urine Culture - Urine, Urine, Clean Catch [[id number]] Collected: 02/23/25 0537    Specimen: Urine, Clean Catch Updated: 02/23/25 0839    [place] Urine Culture Tube - Urine, Clean Catch [[id number]] Collected: 02/23/25 0537    Specimen: Urine, Clean Catch Updated: 02/23/25 0603     Extra Tube Hold for add-ons.     Comment: Auto resulted.       Urinalysis, Microscopic Only - Urine, Clean Catch [[id number]]  (Abnormal) Collected: 02/23/25 0537    Specimen: Urine, Clean Catch Updated: 02/23/25 0602     RBC, UA None Seen /HPF      WBC, UA 6-10 /HPF      Bacteria, UA 4+ /HPF      Squamous Epithelial Cells, UA 0-2 /HPF      Hyaline Casts, UA None Seen /LPF      Methodology Manual Light Microscopy    Urinalysis With Microscopic If Indicated (No Culture) - Urine, Clean Catch [[id number]]  (Abnormal) Collected: 02/23/25 0537    Specimen: Urine, Clean Catch Updated: 02/23/25 0558     Color, UA Yellow     Appearance, UA Clear     pH, UA 8.0     Specific Gravity, UA 1.018     Glucose, UA Negative     Ketones, UA Negative     Bilirubin, UA Negative     Blood, UA Negative     Protein, UA Negative     Leuk Esterase, UA Small (1+)     Nitrite, UA Positive     Urobilinogen, UA 1.0 E.U./dL    Comprehensive Metabolic Panel [[id number]]  (Abnormal) Collected: 02/23/25 0508    Specimen: Blood Updated: 02/23/25 0534     Glucose 147 mg/dL      BUN 12 mg/dL      Creatinine 0.77 mg/dL      Sodium 137 mmol/L      Potassium 3.9 mmol/L      Chloride 102 mmol/L      CO2 25.5 mmol/L      Calcium 8.7 mg/dL      Total Protein 6.6 g/dL       Albumin 4.1 g/dL      ALT (SGPT) 12 U/L      AST (SGOT) 17 U/L      Alkaline Phosphatase 93 U/L      Total Bilirubin 0.4 mg/dL      Globulin 2.5 gm/dL      A/G Ratio 1.6 g/dL      BUN/Creatinine Ratio 15.6     Anion Gap 9.5 mmol/L      eGFR 76.6 mL/min/1.73     Narrative:      GFR Categories in Chronic Kidney Disease (CKD)      GFR Category          GFR (mL/min/1.73)    Interpretation  G1                     90 or greater         Normal or high (1)  G2                      60-89                Mild decrease (1)  G3a                   45-59                Mild to moderate decrease  G3b                   30-44                Moderate to severe decrease  G4                    15-29                Severe decrease  G5                    14 or less           Kidney failure          (1)In the absence of evidence of kidney disease, neither GFR category G1 or G2 fulfill the criteria for CKD.    eGFR calculation 2021 CKD-EPI creatinine equation, which does not include race as a factor    Protime-INR [[id number]]  (Normal) Collected: 02/23/25 0508    Specimen: Blood Updated: 02/23/25 0522     Protime 13.0 Seconds      INR 0.97    Narrative:      Suggested INR therapeutic range for stable oral anticoagulant therapy:    Low Intensity therapy:   1.5-2.0  Moderate Intensity therapy:   2.0-3.0  High Intensity therapy:   2.5-4.0    CBC Auto Differential [[id number]]  (Abnormal) Collected: 02/23/25 0508    Specimen: Blood Updated: 02/23/25 0513     WBC 14.15 10*3/mm3      RBC 4.41 10*6/mm3      Hemoglobin 13.8 g/dL      Hematocrit 42.5 %      MCV 96.4 fL      MCH 31.3 pg      MCHC 32.5 g/dL      RDW 12.6 %      RDW-SD 45.2 fl      MPV 8.7 fL      Platelets 247 10*3/mm3      Neutrophil % 85.2 %      Lymphocyte % 8.6 %      Monocyte % 5.2 %      Eosinophil % 0.0 %      Basophil % 0.4 %      Immature Grans % 0.6 %      Neutrophils, Absolute 12.06 10*3/mm3      Lymphocytes, Absolute 1.21 10*3/mm3      Monocytes, Absolute 0.74 10*3/mm3       Eosinophils, Absolute 0.00 10*3/mm3      Basophils, Absolute 0.06 10*3/mm3      Immature Grans, Absolute 0.08 10*3/mm3      nRBC 0.0 /100 WBC     TSH [[id number]]  (Normal) Collected: 02/23/25 0155    Specimen: Blood from Arm, Left Updated: 02/23/25 0458     TSH 1.800 uIU/mL     Magnesium [[id number]]  (Normal) Collected: 02/23/25 0155    Specimen: Blood from Arm, Left Updated: 02/23/25 0445     Magnesium 2.3 mg/dL     Comprehensive Metabolic Panel [[id number]]  (Abnormal) Collected: 02/23/25 0155    Specimen: Blood from Arm, Left Updated: 02/23/25 0222     Glucose 171 mg/dL      BUN 14 mg/dL      Creatinine 0.81 mg/dL      Sodium 136 mmol/L      Potassium 3.8 mmol/L      Chloride 101 mmol/L      CO2 23.9 mmol/L      Calcium 9.1 mg/dL      Total Protein 7.0 g/dL      Albumin 4.1 g/dL      ALT (SGPT) 12 U/L      AST (SGOT) 16 U/L      Alkaline Phosphatase 100 U/L      Total Bilirubin 0.4 mg/dL      Globulin 2.9 gm/dL      A/G Ratio 1.4 g/dL      BUN/Creatinine Ratio 17.3     Anion Gap 11.1 mmol/L      eGFR 72.1 mL/min/1.73     Narrative:      GFR Categories in Chronic Kidney Disease (CKD)      GFR Category          GFR (mL/min/1.73)    Interpretation  G1                     90 or greater         Normal or high (1)  G2                      60-89                Mild decrease (1)  G3a                   45-59                Mild to moderate decrease  G3b                   30-44                Moderate to severe decrease  G4                    15-29                Severe decrease  G5                    14 or less           Kidney failure          (1)In the absence of evidence of kidney disease, neither GFR category G1 or G2 fulfill the criteria for CKD.    eGFR calculation 2021 CKD-EPI creatinine equation, which does not include race as a factor    CBC & Differential [[id number]]  (Abnormal) Collected: 02/23/25 0155    Specimen: Blood from Arm, Left Updated: 02/23/25 0201    Narrative:      The following orders were created  for panel order CBC & Differential.  Procedure                               Abnormality         Status                     ---------                               -----------         ------                     CBC Auto Differential[[id number]]        Abnormal            Final result                 Please view results for these tests on the individual orders.    CBC Auto Differential [[id number]]  (Abnormal) Collected: 02/23/25 0155    Specimen: Blood from Arm, Left Updated: 02/23/25 0201     WBC 16.27 10*3/mm3      RBC 4.59 10*6/mm3      Hemoglobin 14.2 g/dL      Hematocrit 43.7 %      MCV 95.2 fL      MCH 30.9 pg      MCHC 32.5 g/dL      RDW 12.6 %      RDW-SD 44.5 fl      MPV 8.7 fL      Platelets 258 10*3/mm3      Neutrophil % 89.9 %      Lymphocyte % 5.1 %      Monocyte % 4.4 %      Eosinophil % 0.0 %      Basophil % 0.2 %      Immature Grans % 0.4 %      Neutrophils, Absolute 14.62 10*3/mm3      Lymphocytes, Absolute 0.83 10*3/mm3      Monocytes, Absolute 0.71 10*3/mm3      Eosinophils, Absolute 0.00 10*3/mm3      Basophils, Absolute 0.04 10*3/mm3      Immature Grans, Absolute 0.07 10*3/mm3      nRBC 0.0 /100 WBC     [place] Draw [[id number]] Collected: 02/23/25 0155    Specimen: Blood from Arm, Left Updated: 02/23/25 0200    Narrative:      The following orders were created for panel order [place] Draw.  Procedure                               Abnormality         Status                     ---------                               -----------         ------                     Green Top (Gel)[[id number]]                                  Final result               Lavender Top[[id number]]                                     Final result               Gold Top - SST[[id number]]                                   Final result               Light Blue Top[[id number]]                                   Final result                 Please view results for these tests on the individual orders.    Green Top (Gel) [[id number]]  Collected: 25    Specimen: Blood from Arm, Left Updated: 25 0200     Extra Tube Hold for add-ons.     Comment: Auto resulted.       Lavender Top [[id number]] Collected: 25    Specimen: Blood from Arm, Left Updated: 25 0200     Extra Tube hold for add-on     Comment: Auto resulted       Gold Top - SST [[id number]] Collected: 25    Specimen: Blood from Arm, Left Updated: 25 0200     Extra Tube Hold for add-ons.     Comment: Auto resulted.       Light Blue Top [[id number]] Collected: 25    Specimen: Blood from Arm, Left Updated: 25 0200     Extra Tube Hold for add-ons.     Comment: Auto resulted               Images:    Left hip and femur x-rays reviewed.  Femoral neck fracture displaced as noted.  No other fracture.  Mild arthritic changes at the hip joint.       Assessment  Left femoral neck fracture, displaced    Plan    Due to the patient's acute nature of her injury and ambulatory status I would recommend a left hip hemiarthroplasty.    This was discussed significantly with the patient's significant other.    Risk benefits alternatives and complications was discussed with the patient prior to surgery as well as the patient's significant other.    Maintain n.p.o. status for now.  Anticipate surgery 2025.      Perry Cobb MD  25  09:07 EST      Electronically signed by Perry Cobb MD at 25 0916          Physical Therapy Notes (most recent note)        Barbara De La Cruz, PT at 25 1525  Version 3 of 3         Acute Care - Physical Therapy Treatment Note  [facility]ELLA Brock     Patient Name: Crista Blunt  : 1940  MRN: [id number]  Today's Date: 2025   Onset of Illness/Injury or Date of Surgery: 25  Visit Dx:     ICD-10-CM ICD-9-CM   1. Closed fracture of neck of left femur, initial encounter  S72.002A 820.8   2. Closed fracture of left hip, initial encounter  S72.002A 820.8     Patient Active Problem List   Diagnosis     Hip fracture     History reviewed. No pertinent past medical history.  History reviewed. No pertinent surgical history.  PT Assessment (Last 12 Hours)       PT Evaluation and Treatment       Row Name 02/26/25 1500          Physical Therapy Time and Intention    Document Type therapy note (daily note)  -     Mode of Treatment physical therapy  -     Patient Effort fair  -     Comment Pt seen for treatment this PM, pleasant and cooperative with therapy.  Pt assisted to sit EOB, attempt 3-4 STS with grossly max/depA, some ankle pumps and encouraged other TE  -       Row Name 02/26/25 1500          Bed Mobility    Bed Mobility supine-sit;sit-supine  -     Supine-Sit [place] (Bed Mobility) dependent (less than 25% patient effort)  -     Sit-Supine [place] (Bed Mobility) dependent (less than 25% patient effort)  -     Comment, (Bed Mobility) able to sit EOB however pt had posterior lean  -       Row Name 02/26/25 1500          Transfers    Transfers sit-stand transfer;stand-sit transfer  -       Row Name 02/26/25 1500          Sit-Stand Transfer    Sit-Stand [place] (Transfers) dependent (less than 25% patient effort);2 person assist  -       Row Name 02/26/25 1500          Stand-Sit Transfer    Stand-Sit [place] (Transfers) dependent (less than 25% patient effort);2 person assist  -       Row Name 02/26/25 1500          Gait/Stairs (Locomotion)    Patient was able to Ambulate no, other medical factors prevent ambulation  -     Reason Patient was unable to Ambulate Other (Comment);Cognitive Deficit/Severe Dementia  unsafe  -       Row Name 02/26/25 1500          Motor Skills    Therapeutic Exercise hip;ankle  attempted hip add, ankle DF/PF with multiple repetitions  -       Row Name             Wound 02/23/25 0959 Left anterior thigh    Wound - Properties Group Placement Date: 02/23/25  -JG Placement Time: 0959 -JG Side: Left  -JG Orientation: anterior  -JG Location: thigh   -JG    Retired Wound - Properties Group Placement Date: 02/23/25  -JG Placement Time: 0959 -JG Side: Left  -JG Orientation: anterior  -JG Location: thigh  -JG    Retired Wound - Properties Group Placement Date: 02/23/25  -JG Placement Time: 0959 -JG Side: Left  -JG Orientation: anterior  -JG Location: thigh  -JG    Retired Wound - Properties Group Date first assessed: 02/23/25  -JG Time first assessed: 0959 -JG Side: Left  -JG Location: thigh  -JG      Row Name 02/26/25 1500          Positioning and Restraints    Pre-Treatment Position in bed  -     Post Treatment Position bed  -KH     In Bed supine;with family/caregiver;exit alarm on  -               User Key  (r) = Recorded By, (t) = Taken By, (c) = Cosigned By      Initials Name Provider Type    Kristin Avilez, RN Registered Nurse    Barbara Galarza, PT Physical Therapist                    Physical Therapy Education        No education to display                  PT Recommendation and Plan     Progress Summary (PT)  Daily Progress Summary (PT): Pt seen for treatment this date, confusion limits pt participation; grossly max/depA at this time, no change to POC. Prognosis guarded, recommend SNF       Time Calculation:    PT Charges       Row Name 02/26/25 1515             Time Calculation    Start Time 1325  -KH      PT Received On 02/26/25  -         Time Calculation- PT    Total Timed Code Minutes- PT 23 minute(s)  -                User Key  (r) = Recorded By, (t) = Taken By, (c) = Cosigned By      Initials Name Provider Type    Barbara Galarza PT Physical Therapist                  Therapy Charges for Today       Code Description Service Date Service Provider Modifiers Qty    50026796037 HC PT THER PROC EA 15 MIN 2/25/2025 Barbara De La Cruz, PT GP 1    05297641126 HC PT THERAPEUTIC ACT EA 15 MIN 2/25/2025 Barbara De La Cruz, PT GP 1    47477969509 HC PT THER PROC EA 15 MIN 2/26/2025 Barbara De La Cruz, PT GP 1    60158120531 HC PT  THERAPEUTIC ACT EA 15 MIN 2025 Barbara De La Cruz, PT GP 1            PT G-Codes  AM-PAC 6 Clicks Score (PT): 6    aBrbara De La Cruz, PT  2025      Electronically signed by Barbara De La Cruz, PT at 25 1532       Barbara De La Cruz, PT at 25 1525  Version 2 of 3         Acute Care - Physical Therapy Treatment Note  ESTELLA Frankenmuth     Patient Name: Crista Blunt  : 1940  MRN: [id number]  Today's Date: 2025   Onset of Illness/Injury or Date of Surgery: 25  Visit Dx:     ICD-10-CM ICD-9-CM   1. Closed fracture of neck of left femur, initial encounter  S72.002A 820.8   2. Closed fracture of left hip, initial encounter  S72.002A 820.8     Patient Active Problem List   Diagnosis    Hip fracture     History reviewed. No pertinent past medical history.  History reviewed. No pertinent surgical history.  PT Assessment (Last 12 Hours)       PT Evaluation and Treatment       Row Name 25 1500          Physical Therapy Time and Intention    Document Type therapy note (daily note)  -     Mode of Treatment physical therapy  -     Patient Effort fair  -     Comment Pt seen for treatment this PM, pleasant and cooperative with therapy.  Pt assisted to sit EOB, attempt 3-4 STS with grossly max/depA, some ankle pumps and encouraged other TE  -       Row Name 25 1500          Bed Mobility    Bed Mobility supine-sit;sit-supine  -     Supine-Sit [place] (Bed Mobility) dependent (less than 25% patient effort)  -     Sit-Supine [place] (Bed Mobility) dependent (less than 25% patient effort)  -     Comment, (Bed Mobility) able to sit EOB however pt had posterior lean  -       Row Name 25 1500          Transfers    Transfers sit-stand transfer;stand-sit transfer  -       Row Name 25 1500          Sit-Stand Transfer    Sit-Stand [place] (Transfers) dependent (less than 25% patient effort);2 person assist  -       Row Name 25 1500          Stand-Sit  Transfer    Stand-Sit [place] (Transfers) dependent (less than 25% patient effort);2 person assist  -       Row Name 02/26/25 1500          Gait/Stairs (Locomotion)    Patient was able to Ambulate no, other medical factors prevent ambulation  -     Reason Patient was unable to Ambulate Other (Comment);Cognitive Deficit/Severe Dementia  unsafe  -       Row Name 02/26/25 1500          Motor Skills    Therapeutic Exercise hip;ankle  attempted hip add, ankle DF/PF with multiple repetitions  -       Row Name             Wound 02/23/25 0959 Left anterior thigh    Wound - Properties Group Placement Date: 02/23/25  -JG Placement Time: 0959 -JG Side: Left  -JG Orientation: anterior  -JG Location: thigh  -JG    Retired Wound - Properties Group Placement Date: 02/23/25  -JG Placement Time: 0959  -JG Side: Left  -JG Orientation: anterior  -JG Location: thigh  -JG    Retired Wound - Properties Group Placement Date: 02/23/25  -JG Placement Time: 0959  -JG Side: Left  -JG Orientation: anterior  -JG Location: thigh  -JG    Retired Wound - Properties Group Date first assessed: 02/23/25  -JG Time first assessed: 0959  -JG Side: Left  -JG Location: thigh  -JG      Row Name 02/26/25 1500          Positioning and Restraints    Pre-Treatment Position in bed  -     Post Treatment Position bed  -     In Bed supine;with family/caregiver;exit alarm on  -               User Key  (r) = Recorded By, (t) = Taken By, (c) = Cosigned By      Initials Name Provider Type    Kristin Avilez, RN Registered Nurse    Barbara Galarza, PT Physical Therapist                    Physical Therapy Education        No education to display                  PT Recommendation and Plan     Progress Summary (PT)  Daily Progress Summary (PT): Pt seen for treatment this date, participation was good, confusion limits pt participation; grossly max/depA at this time, no change to POC. Prognosis guarded to fair.       Time Calculation:    PT  Charges       Row Name 25 1515             Time Calculation    Start Time 1325  -KH      PT Received On 25  -         Time Calculation- PT    Total Timed Code Minutes- PT 23 minute(s)  -                User Key  (r) = Recorded By, (t) = Taken By, (c) = Cosigned By      Initials Name Provider Type    Barbara Galarza, PT Physical Therapist                  Therapy Charges for Today       Code Description Service Date Service Provider Modifiers Qty    63715373913 HC PT THER PROC EA 15 MIN 2025 Barbara De La Cruz, PT GP 1    43869160687 HC PT THERAPEUTIC ACT EA 15 MIN 2025 Barbara De La Cruz, PT GP 1    14358702363 HC PT THER PROC EA 15 MIN 2025 Barbara De La Cruz, PT GP 1    85374852941 HC PT THERAPEUTIC ACT EA 15 MIN 2025 Barbara De La Cruz, PT GP 1            PT G-Codes  AM-PAC 6 Clicks Score (PT): 6    Barbara De La Cruz PT  2025      Electronically signed by Barbara De La Cruz, PT at 25 1527       Barbara De La Cruz PT at 25 1525  Version 1 of 3         Acute Care - Physical Therapy Treatment Note  [facility]ELLA Brock     Patient Name: Crista Blunt  : 1940  MRN: [id number]  Today's Date: 2025   Onset of Illness/Injury or Date of Surgery: 25  Visit Dx:     ICD-10-CM ICD-9-CM   1. Closed fracture of neck of left femur, initial encounter  S72.002A 820.8   2. Closed fracture of left hip, initial encounter  S72.002A 820.8     Patient Active Problem List   Diagnosis    Hip fracture     History reviewed. No pertinent past medical history.  History reviewed. No pertinent surgical history.  PT Assessment (Last 12 Hours)       PT Evaluation and Treatment       Row Name 25 1500          Physical Therapy Time and Intention    Document Type therapy note (daily note)  -KH     Mode of Treatment physical therapy  -KH     Patient Effort good  -     Comment Pt seen for treatment this PM, pleasant and cooperative with therapy.  Pt assisted to sit EOB, attempt 3-4 STS with  grossly max/depA, some ankle pumps and encouraged other TE  -       Row Name 02/26/25 1500          Bed Mobility    Bed Mobility supine-sit;sit-supine  -     Supine-Sit [place] (Bed Mobility) dependent (less than 25% patient effort)  -     Sit-Supine [place] (Bed Mobility) dependent (less than 25% patient effort)  -     Comment, (Bed Mobility) able to sit EOB however pt had posterior lean  -       Row Name 02/26/25 1500          Transfers    Transfers sit-stand transfer;stand-sit transfer  -       Row Name 02/26/25 1500          Sit-Stand Transfer    Sit-Stand [place] (Transfers) dependent (less than 25% patient effort);2 person assist  -       Row Name 02/26/25 1500          Stand-Sit Transfer    Stand-Sit [place] (Transfers) dependent (less than 25% patient effort);2 person assist  -       Row Name 02/26/25 1500          Gait/Stairs (Locomotion)    Patient was able to Ambulate no, other medical factors prevent ambulation  -     Reason Patient was unable to Ambulate Other (Comment);Cognitive Deficit/Severe Dementia  unsafe  -       Row Name 02/26/25 1500          Motor Skills    Therapeutic Exercise hip;ankle  attempted hip add, ankle DF/PF with multiple repetitions  -       Row Name             Wound 02/23/25 0959 Left anterior thigh    Wound - Properties Group Placement Date: 02/23/25  -JG Placement Time: 0959 -JG Side: Left  -JG Orientation: anterior  -JG Location: thigh  -JG    Retired Wound - Properties Group Placement Date: 02/23/25  -JG Placement Time: 0959 -JG Side: Left  -JG Orientation: anterior  -JG Location: thigh  -JG    Retired Wound - Properties Group Placement Date: 02/23/25  -JG Placement Time: 0959 -JG Side: Left  -JG Orientation: anterior  -JG Location: thigh  -JG    Retired Wound - Properties Group Date first assessed: 02/23/25  -JG Time first assessed: 0959 -JG Side: Left  -JG Location: thigh  -JG      Row Name 02/26/25 1500          Positioning and  Restraints    Pre-Treatment Position in bed  -KH     Post Treatment Position bed  -KH     In Bed supine;with family/caregiver;exit alarm on  -               User Key  (r) = Recorded By, (t) = Taken By, (c) = Cosigned By      Initials Name Provider Type    Kristin Avilez, RN Registered Nurse    Barbara Galarza, PT Physical Therapist                    Physical Therapy Education        No education to display                  PT Recommendation and Plan     Progress Summary (PT)  Daily Progress Summary (PT): Pt seen for treatment this date, participation was good, confusion limits pt participation; grossly max/depA at this time, no change to POC. Prognosis guarded to fair.       Time Calculation:    PT Charges       Row Name 25 1515             Time Calculation    Start Time 1325  -      PT Received On 25  -         Time Calculation- PT    Total Timed Code Minutes- PT 23 minute(s)  -                User Key  (r) = Recorded By, (t) = Taken By, (c) = Cosigned By      Initials Name Provider Type    Barbara Galarza, HAYLEE Physical Therapist                  Therapy Charges for Today       Code Description Service Date Service Provider Modifiers Qty    06156404854 HC PT THER PROC EA 15 MIN 2025 Barbara De La Cruz, PT GP 1    21435242831 HC PT THERAPEUTIC ACT EA 15 MIN 2025 Barbara De La Cruz, PT GP 1    54210593701 HC PT THER PROC EA 15 MIN 2025 Barbara De La Cruz, PT GP 1    93417864419 HC PT THERAPEUTIC ACT EA 15 MIN 2025 Barbara De La Cruz, PT GP 1            PT G-Codes  AM-PAC 6 Clicks Score (PT): 6    Barbara De La Cruz PT  2025      Electronically signed by Barbara De La Cruz PT at 25 1525          Occupational Therapy Notes (most recent note)        Radha Pablo, OT at 25 1050          Acute Care - Occupational Therapy Treatment Note  [facility]LLA [facility]     Patient Name: Crista Blunt  : 1940  MRN: [id number]  Today's Date: 2025  Onset of Illness/Injury or  Date of Surgery: 02/22/25     Referring Physician: Jordyn    Admit Date: 2/22/2025       ICD-10-CM ICD-9-CM   1. Closed fracture of neck of left femur, initial encounter  S72.002A 820.8   2. Closed fracture of left hip, initial encounter  S72.002A 820.8     Patient Active Problem List   Diagnosis    Hip fracture     History reviewed. No pertinent past medical history.  History reviewed. No pertinent surgical history.      OT ASSESSMENT FLOWSHEET (Last 12 Hours)       OT Evaluation and Treatment       Row Name 02/27/25 1044                   OT Time and Intention    Subjective Information no complaints  -LA        Document Type therapy note (daily note)  -LA        Mode of Treatment occupational therapy  -LA        Patient Effort fair  -LA           General Information    Patient Profile Reviewed yes  -LA        General Observations of Patient Patient sleeping in bed upon arrival. Patient spouse present. Spouse reported that patient slept well prevoius night and try to get patient awake for therapy. OT completed PROM of BUE however patient remainded lethargic. BUE ROM WFL. Patient unable to follow any command and dependent for bed mobility. Patient spouse reports that patient was dependent with ADLs at baseline.  -LA           Cognition    Affect/Mental Status (Cognition) low arousal/lethargic  -LA        Orientation Status (Cognition) unable/difficult to assess  -LA        Follows Commands (Cognition) does not follow one-step commands  -LA           Motor Skills    Therapeutic Exercise shoulder;elbow/forearm;wrist;hand  -LA           Wound 02/23/25 0959 Left anterior thigh    Wound - Properties Group Placement Date: 02/23/25  -JG Placement Time: 0959 -JG Side: Left  -JG Orientation: anterior  -JG Location: thigh  -JG    Retired Wound - Properties Group Placement Date: 02/23/25  -JG Placement Time: 0959  -JG Side: Left  -JG Orientation: anterior  -JG Location: thigh  -JG    Retired Wound - Properties Group Placement  Date: 02/23/25  -JG Placement Time: 0959 -JG Side: Left  -JG Orientation: anterior  -JG Location: thigh  -JG    Retired Wound - Properties Group Date first assessed: 02/23/25  -JG Time first assessed: 0959 -JG Side: Left  -JG Location: thigh  -JG       Plan of Care Review    Plan of Care Reviewed With patient  -LA           Positioning and Restraints    Pre-Treatment Position in bed  -LA        Post Treatment Position bed  -LA        In Bed supine;call light within reach;encouraged to call for assist;exit alarm on;with family/caregiver;with other staff  -LA                  User Key  (r) = Recorded By, (t) = Taken By, (c) = Cosigned By      Initials Name Effective Dates    Kristin Avilez RN 08/29/23 -     Radha Sue OT 02/14/22 -                      Occupational Therapy Education        No education to display                      OT Recommendation and Plan     Plan of Care Review  Plan of Care Reviewed With: patient  Plan of Care Reviewed With: patient        Time Calculation:     Therapy Charges for Today       Code Description Service Date Service Provider Modifiers Qty    69273218244  OT THER PROC EA 15 MIN 2/27/2025 Radha Pablo OT GO 1    [id number]  OT THERAPEUTIC ACT EA 15 MIN 2/27/2025 Radha Pablo OT GO 1                 Radha Pablo OT  2/27/2025    Electronically signed by Radha Pablo OT at 02/27/25 1050

## 2025-02-27 NOTE — PLAN OF CARE
Goal Outcome Evaluation:  Plan of Care Reviewed With: patient        Progress: no change  Outcome Evaluation: Pt's resting in bed, alert to self, stable on RA, has no c/o, no s/s of distress noted,  at bedside. Pt is more alert today, pleasant. DRS is dry/intact. Pt worked with PT, not able to ambulate. Con't with POC.

## 2025-02-27 NOTE — PROGRESS NOTES
[facility]  PROGRESS NOTE     Patient Identification:  Name:  Crista Blunt  Age:  84 y.o.  Sex:  female  :  1940  MRN:  [id number]  Visit Number:  [id number]  ROOM: [address] [place] [address]     Primary Care Provider:  Abby Mckenzie DO    Length of stay in inpatient status:  4    Subjective     Chief Compliant: Status post left hip fracture with repair  Chief Complaint   Patient presents with    Fall       History of Presenting Illness: 84-year-old female with advanced dementia who is status post left hip fracture with repair.  Patient being treated for UTI as well.  Patient is in the process of being evaluated for SNF for short-term rehab.    Objective     Current Hospital Meds:azithromycin, 250 mg, Oral, Daily  cefdinir, 300 mg, Oral, Q12H  enoxaparin, 40 mg, Subcutaneous, Q24H  ropivacaine 0.5 % 250 mg, Morphine 10 mg, cloNIDine 80 mcg, ketorolac 30 mg, EPINEPHrine 1 mg/mL 0.5 mg in sodium chloride 0.9 % 50 mL solution, , Intra-articular, Once  sodium chloride, 10 mL, Intravenous, Q12H       ----------------------------------------------------------------------------------------------------------------------  Vital Signs:  Temp:  [97.2 °F (36.2 °C)-98.6 °F (37 °C)] 97.3 °F (36.3 °C)  Heart Rate:  [] 93  Resp:  [16-20] 18  BP: (102-130)/(50-70) 123/64  SpO2:  [95 %] 95 %  on   ;   Device (Oxygen Therapy): room air  Body mass index is 22.82 kg/m².    Wt Readings from Last 3 Encounters:   25 62.2 kg (137 lb 2 oz)   24 59 kg (130 lb)     Intake & Output (last 3 days)          0700    P.O.  480 1120 560    I.V. (mL/kg)  0 (0) 0 (0)     Total Intake(mL/kg)  480 (7.7) 1120 (18) 560 (9)    Urine (mL/kg/hr) 350 (0.2) 1200 (0.8) 1250 (0.8)     Stool   0     Total Output 350 1200 1250     Net -350 -720 -130 +560            Urine Unmeasured Occurrence   1 x     Stool Unmeasured Occurrence   0 x            Diet: Regular/House; Fluid Consistency: Thin (IDDSI 0)  ----------------------------------------------------------------------------------------------------------------------  Physical exam:  Constitutional: No acute distress  HEENT: Normocephalic atraumatic  Neck: Supple  Cardiovascular: Regular rate and rhythm  Pulmonary/Chest: Clear to auscultation  Abdominal: Positive bowel sounds soft.   Musculoskeletal: Status post left hip repair  Neurological: No focal deficits  Skin: No rash  Peripheral vascular: No edema  Genitourinary:  ----------------------------------------------------------------------------------------------------------------------    Last echocardiogram:    ----------------------------------------------------------------------------------------------------------------------  Results from last 7 days   Lab Units 02/27/25  1109 02/25/25  0239 02/23/25  1536 02/23/25  0508   WBC 10*3/mm3 9.84 12.69* 16.87* 14.15*   HEMOGLOBIN g/dL 10.8* 10.7* 11.4* 13.8   HEMATOCRIT % 35.1 33.4* 34.6 42.5   MCV fL 99.7* 97.9* 96.9 96.4   MCHC g/dL 30.8* 32.0 32.9 32.5   PLATELETS 10*3/mm3 284 203 190 247   INR   --   --   --  0.97     Results from last 7 days   Lab Units 02/23/25  1602   PH, ARTERIAL pH units 7.397   PO2 ART mm Hg 55.8*   PCO2, ARTERIAL mm Hg 40.0   HCO3 ART mmol/L 24.6     Results from last 7 days   Lab Units 02/27/25  1109 02/25/25  0752 02/25/25  0239 02/23/25  1536 02/23/25  0508 02/23/25  0155   SODIUM mmol/L 138  --  136 137 137 136   POTASSIUM mmol/L 3.8  --  3.7 4.3 3.9 3.8   MAGNESIUM mg/dL  --   --   --   --   --  2.3   CHLORIDE mmol/L 105  --  105 103 102 101   CO2 mmol/L 23.7  --  23.2 22.9 25.5 23.9   BUN mg/dL 12  --  12 12 12 14   CREATININE mg/dL 0.66  --  0.78 0.81 0.77 0.81   CALCIUM mg/dL 8.3*  --  8.0* 7.9* 8.7 9.1   IONIZED CALCIUM mmol/L  --  1.08*  --   --   --   --    GLUCOSE mg/dL 105*  --  133* 118* 147* 171*   ALBUMIN g/dL  --   --   --  3.3* 4.1 4.1   BILIRUBIN  "mg/dL  --   --   --  0.4 0.4 0.4   ALK PHOS U/L  --   --   --  70 93 100   AST (SGOT) U/L  --   --   --  18 17 16   ALT (SGPT) U/L  --   --   --  11 12 12   Estimated Creatinine Clearance: 63.4 mL/min (by C-G formula based on SCr of 0.66 mg/dL).  No results found for: \"AMMONIA\"              No results found for: \"HGBA1C\", \"POCGLU\"  Lab Results   Component Value Date    TSH 1.800 02/23/2025     No results found for: \"PREGTESTUR\", \"PREGSERUM\", \"HCG\", \"HCGQUANT\"  Pain Management Panel           No data to display              Brief Urine Lab Results  (Last result in the past 365 days)        Color   Clarity   Blood   Leuk Est   Nitrite   Protein   CREAT   Urine HCG        02/23/25 0537 Yellow   Clear   Negative   Small (1+)   Positive   Negative                 Blood Culture   Date Value Ref Range Status   02/24/2025 No growth at 2 days  Preliminary   02/24/2025 No growth at 2 days  Preliminary     Results from last 7 days   Lab Units 02/23/25  0537   NITRITE UA  Positive*   WBC UA /HPF 6-10*   BACTERIA UA /HPF 4+*   SQUAM EPITHEL UA /HPF 0-2   URINECX  >100,000 CFU/mL Escherichia coli*     Urine Culture   Date Value Ref Range Status   02/23/2025 >100,000 CFU/mL Escherichia coli (A)  Final     No results found for: \"WOUNDCX\"  No results found for: \"STOOLCX\"  Results from last 7 days   Lab Units 02/24/25  1004   PROCALCITONIN ng/mL 1.31*       I have personally looked at the labs and they are summarized above.  ----------------------------------------------------------------------------------------------------------------------  Detailed radiology reports for the last 24 hours:    Imaging Results (Last 24 Hours)       ** No results found for the last 24 hours. **          Final impressions for the last 30 days of radiology reports:    XR Pelvis 1 or 2 View    Result Date: 2/24/2025    Left total hip arthroplasty. Components appear well seated.  This report was finalized on 2/24/2025 8:30 AM by Dr. Bradley Rausch MD.  "     XR Chest 1 View    Result Date: 2/23/2025   Mild enlarged heart size. Coarsened bronchovascular pattern to the lungs Possible left lower lobe pneumonia. Azygos lobe variant in the right upper lobe No fracture or dislocation.  This report was finalized on 2/23/2025 7:39 PM by Eamon Omalley MD.      XR Chest 1 View    Result Date: 2/23/2025   Normal heart size Hypoinflated lungs Coarsened bronchovascular pattern to the lungs. Left lower lobe atelectasis versus pneumonia. No pleural effusion or pneumothorax No fracture or foreign body.  This report was finalized on 2/23/2025 5:27 AM by Eamon Omalley MD.      XR Tibia Fibula 2 View Left    Result Date: 2/23/2025   Intact left tibia and fibula with no acute fracture or dislocation. 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 Mild osteoarthritis at the left tibiotalar joint. No foreign body.    This report was finalized on 2/23/2025 12:20 AM by Eamon Omalley MD.      XR Femur 2 View Left    Result Date: 2/23/2025   Acute left femoral neck fracture. No acute dislocation. Osteoarthritis.  This report was finalized on 2/23/2025 12:19 AM by Eamon Omalley MD.      XR Hip With or Without Pelvis 2 - 3 View Left    Result Date: 2/23/2025  Impression:  Acute left femoral neck fracture. No acute dislocation of the left femoral head. Mild osteoarthritis at the right and left hip joint. Intact pelvis No acute foreign body.  This report was finalized on 2/23/2025 12:18 AM by Eamon Omalley MD.      CT Cervical Spine Without Contrast    Result Date: 2/23/2025   No acute fracture or dislocation. No apical pneumothorax.  This report was finalized on 2/23/2025 12:16 AM by Eamon Omalley MD.      CT Head Without Contrast    Result Date: 2/23/2025  Impression:  Moderate generalized brain volume loss with mild to moderate chronic small vessel ischemic type changes in the white matter. Ex-vacuo dilatation of the ventricular system due to underlying deep white matter parenchymal volume loss  No acute intracranial hemorrhage, mass, infarct, or edema. Minimal mucosal thickening in the ethmoid air cells Partial opacification of the left mastoid air cells. No fracture or dislocation. No scalp hematoma.  This report was finalized on 2/23/2025 12:15 AM by Eamon Omalley MD.     I have personally looked at the radiology images and read the final radiology report.    Assessment & Plan    Status post left hip fracture requiring ORIF being evaluated for SNF at this time.    UTI secondary to E. coli continue Omnicef.    Questionable left lower lobe pneumonia patient's lungs are clear at this point she is completing the Nutramax course in addition to Omnicef    Alzheimer's type dementia supportive care    Hypertension controlled    GERD continue PPI    VTE Prophylaxis:   Lovenox        Deandre Hernandez MD  [facility]  02/27/25  12:58 EST

## 2025-02-27 NOTE — DISCHARGE PLACEMENT REQUEST
"Crista Connors (84 y.o. Female)       Date of Birth   1940    Social Security Number       Address   [address] [place] [address]    Home Phone   [phone]    MRN   [id number]       [facility]    Marital Status                               Admission Date   25    Admission Type   Emergency    Admitting Provider   Heriberto Arteaga MD    Attending Provider   Deandre Hernandez MD    Department, Room/Bed   [address], 3339/       Discharge Date       Discharge Disposition       Discharge Destination                                 Attending Provider: Deandre Hernandez MD    Allergies: Sulfa Antibiotics    Isolation: None   Infection: None   Code Status: CPR    Ht: 165.1 cm (65\")   Wt: 62.2 kg (137 lb 2 oz)    Admission Cmt: None   Principal Problem: Hip fracture [S72.009A]                   Active Insurance as of 2025       Primary Coverage       Payor Plan Insurance Group Employer/Plan Group    HUMANA MEDICARE REPLACEMENT HU[employer] MEDICARE ADVANTAGE GROUP [id number]       Payor Plan Address Payor Plan Phone Number Payor Plan Fax Number Effective Dates    [address] [address] [phone]  2019 - None Entered    [facility] [id number]         Subscriber Name Subscriber Birth Date Member ID       CRISTA CONNORS 1940 [id number]                     Emergency Contacts        (Rel.) Home Phone Work Phone Mobile Phone    Donavon Connors (Spouse) [phone] -- --                 History & Physical        Aysha Cardoza APRN at 25 0306       Attestation signed by Heriberto Arteaga MD at 25 0504    I have reviewed this documentation and agree.  I have discussed and formulated the assessment and plan with ZABRINA Olmstead.                      [facility] Medicine Services  History & Physical    Patient Identification:  Name:  Crista Connors  Age:  84 y.o.  Sex:  female  :  1940  MRN:  [id number]   Visit Number:  [id number]  Admit " Date: 2/22/2025   Primary Care Physician:  Abby Mckenzie DO    Subjective     Chief complaint: Fall    History of presenting illness:      Crista Blunt is a 84 y.o. female with past medical history significant for severe Alzheimer's dementia, hypercholesterolemia, hypertension, GERD, osteoarthritis, depression, anxiety, and advanced age.  Patient transported to [facility] emergency department for further evaluation of left hip pain after experiencing a mechanical fall at home.  Patient does have severe dementia and was unable to provide details for HPI.  However, spouse was at bedside.  He states that he was helping patient walk to the living room from the kitchen when the patient went into a direction he was not expecting, causing patient to fall and hit her head and the left hip.  He states that he and family member had a very difficult time getting the patient up out of the floor.  He states that patient typically does have a hard time ambulating, although usually she is a x 1 assist. He states that patient has not taken any home medications in approximately 2 years. Spouse states that patient has a poor appetite and usually only eats 1 meal per day.  During my evaluation, patient was awake, calm, cooperative, and appearing in no acute distress.  She denied any pain at the time of my exam.  She was able to state her name.  She was unable to answer any other pertinent questions or orientation questions.  The left lower extremity appears neurovascularly intact.  Left lower extremity appears shortened and externally rotated.  X-ray of the left hip obtained in the ED revealed acute left femoral neck fracture without dislocation. ED provider discussed with orthopedic surgery who accepted patient in consult.  Patient will be admitted to the [facility] floor for further monitoring, evaluation, and treatment.  Discussed plans with patient's spouse who voices agreement and understanding with no  further questions or concerns at this time.    Upon arrival to the ED, vital signs were temperature 98, pulse 90, respirations 18, blood pressure 167/89, SpO2 saturation 92% on room air.  CMP with glucose 171, otherwise unremarkable.  CBC with WBCs 16.27, otherwise unremarkable.  Chest x-ray pending.  X-ray of the left hip noted acute left femoral neck fracture.  No acute dislocation of the left femoral head.  Mild osteoarthritis at the right and left hip joint.  Intact pelvis.  No acute foreign body.  X-ray of the left tib-fib unremarkable.  X-ray of the left femur noted acute left femoral neck fracture.  No acute dislocation.  CT of the head without contrast noted moderate generalized brain volume loss with mild to moderate chronic small vessel ischemic type changes in the white matter.  Ex vacuo dilatation of the ventricular system due to underlying deep white matter parenchymal volume loss.  No acute intracranial hemorrhage, mass, infarct, or edema.  No scalp hematoma.  CT of the cervical spine without contrast unremarkable.  No acute fracture or dislocation.  No apical pneumothorax.    Known Emergency Department medications received prior to my evaluation included N/A (no medications given while in ED). Emergency Department Room location at the time of my evaluation was \[facility]\"". Discussed with admitting physician, Heriberto Thornton MD.      ---------------------------------------------------------------------------------------------------------------------   Review of Systems   Unable to perform ROS: Dementia     ---------------------------------------------------------------------------------------------------------------------   History reviewed. No pertinent past medical history.  History reviewed. No pertinent surgical history.  History reviewed. No pertinent family history.  Social History     Socioeconomic History    Marital status:       ---------------------------------------------------------------------------------------------------------------------   Allergies:  Sulfa antibiotics  ---------------------------------------------------------------------------------------------------------------------   Home medications:    Medications below are reported home medications pulling from within the system; at this time, these medications have not been reconciled unless otherwise specified and are in the verification process for further verifcation as current home medications.  (Not in a hospital admission)      Hospital Scheduled Meds:  Morphine, 2 mg, Intravenous, Once  ondansetron, 4 mg, Intravenous, Once           Current listed hospital scheduled medications may not yet reflect those currently placed in orders that are signed and held awaiting patient's arrival to floor.   ---------------------------------------------------------------------------------------------------------------------     Objective     Vital Signs:  Temp:  [98 °F (36.7 °C)] 98 °F (36.7 °C)  Heart Rate:  [] 100  Resp:  [18] 18  BP: (140-167)/(87-89) 140/87      02/22/25 2023   Weight: 59 kg (130 lb 1.1 oz)     Body mass index is 21.64 kg/m².  ---------------------------------------------------------------------------------------------------------------------       Physical Exam  Vitals reviewed.   Constitutional:       General: She is awake. She is not in acute distress.     Appearance: She is ill-appearing (chronically). She is not diaphoretic.   HENT:      Head: Normocephalic and atraumatic.      Mouth/Throat:      Mouth: Mucous membranes are dry.   Eyes:      Extraocular Movements: Extraocular movements intact.      Pupils: Pupils are equal, round, and reactive to light.   Cardiovascular:      Rate and Rhythm: Normal rate and regular rhythm.      Pulses: Normal pulses.           Dorsalis pedis pulses are 2+ on the right side and 2+ on the left side.      Heart  sounds: Normal heart sounds. No murmur heard.     No friction rub.   Pulmonary:      Effort: Pulmonary effort is normal. No accessory muscle usage, respiratory distress or retractions.      Breath sounds: No wheezing, rhonchi or rales.   Abdominal:      General: Abdomen is flat. Bowel sounds are normal. There is no distension.      Palpations: Abdomen is soft.      Tenderness: There is no abdominal tenderness. There is no guarding.   Musculoskeletal:      Cervical back: Neck supple. No rigidity.      Right lower leg: No edema.      Left lower leg: No edema.      Comments: LLE appears shortened and externally rotated. Appearing neurovascularly intact upon my exam.    Skin:     General: Skin is warm and dry.      Capillary Refill: Capillary refill takes 2 to 3 seconds.      Coloration: Skin is pale.   Neurological:      Mental Status: She is alert. Mental status is at baseline. She is disoriented.      Sensory: Sensation is intact.      Motor: Weakness (generalized) present. No tremor.   Psychiatric:         Attention and Perception: She is inattentive.         Mood and Affect: Mood is not anxious.         Behavior: Behavior is not agitated, aggressive or combative. Behavior is cooperative.         Cognition and Memory: Cognition is impaired. Memory is impaired.       ---------------------------------------------------------------------------------------------------------------------  EKG: Performed in ED during my evaluation. Revealed normal sinus rhythm/sinus tachycardia 100 without evidence of acute ischemia. Image upload pending.     Telemetry:  Patient not on continuous cardiac monitoring in [facility] Bed 101 in ED.   ---------------------------------------------------------------------------------------------------------------------   Results from last 7 days   Lab Units 02/23/25  0155   WBC 10*3/mm3 16.27*   HEMOGLOBIN g/dL 14.2   HEMATOCRIT % 43.7   MCV fL 95.2   MCHC g/dL 32.5   PLATELETS 10*3/mm3 258        "  Results from last 7 days   Lab Units 02/23/25  0155   SODIUM mmol/L 136   POTASSIUM mmol/L 3.8   CHLORIDE mmol/L 101   CO2 mmol/L 23.9   BUN mg/dL 14   CREATININE mg/dL 0.81   CALCIUM mg/dL 9.1   GLUCOSE mg/dL 171*   ALBUMIN g/dL 4.1   BILIRUBIN mg/dL 0.4   ALK PHOS U/L 100   AST (SGOT) U/L 16   ALT (SGPT) U/L 12   Estimated Creatinine Clearance: 49 mL/min (by C-G formula based on SCr of 0.81 mg/dL).  No results found for: \"AMMONIA\"          No results found for: \"HGBA1C\"  Lab Results   Component Value Date    TSH 2.420 04/04/2024     No results found for: \"PREGTESTUR\", \"PREGSERUM\", \"HCG\", \"HCGQUANT\"  Pain Management Panel           No data to display                  ---------------------------------------------------------------------------------------------------------------------  Imaging Results (Last 7 Days)       Procedure Component Value Units Date/Time    XR Chest 1 View [[id number]] Resulted: 02/23/25 0204     Updated: 02/23/25 0223    XR Tibia Fibula 2 View Left [[id number]] Collected: 02/23/25 0019     Updated: 02/23/25 0022    Narrative:      PROCEDURE: X-ray examination of the left tibia and fibula performed on  February 22, 2025. 3 views.     HISTORY: Fall. Leg pain.     COMPARISON: None.     FINDINGS:     Intact left tibia and fibula with no acute fracture or dislocation.  No lytic or blastic lesion. No foreign body.  No tibiotalar joint effusion.  No soft tissue swelling.       Impression:         Intact left tibia and fibula with no acute fracture or dislocation.  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  Mild osteoarthritis at the left tibiotalar joint.  No foreign body.           This report was finalized on 2/23/2025 12:20 AM by Eamon Omalley MD.       XR Femur 2 View Left [[id number]] Collected: 02/23/25 0018     Updated: 02/23/25 0021    Narrative:      PROCEDURE: X-ray examination of the left femur performed on February 22, 2025. 4 films.     HISTORY: Left leg pain. Hip pain. Fall.   "   COMPARISON: None.     FINDINGS:     Acute left femoral neck fracture.  No acute dislocation of the left femoral head.  Mild osteoarthritis at the left hip joint  Mild osteoarthritis at the left knee.  No acute foreign body.       Impression:         Acute left femoral neck fracture.  No acute dislocation.  Osteoarthritis.     This report was finalized on 2/23/2025 12:19 AM by Eamon Omalley MD.       XR Hip With or Without Pelvis 2 - 3 View Left [[id number]] Collected: 02/23/25 0016     Updated: 02/23/25 0020    Narrative:      PROCEDURE: X-ray examination of the pelvis and left hip performed on  February 22, 2025. 3 views.     HISTORY: Fall. Pain.     COMPARISON: None.     FINDINGS:     Intact pelvis  Intact SI joints and intact pubic symphysis.  Mild osteoarthritis at the right and left hip joint.  Degenerative disc disease in the lower lumbar spine.  Acute left femoral neck fracture.   No acute dislocation of the left femoral head.       Impression:      Impression:     Acute left femoral neck fracture.   No acute dislocation of the left femoral head.  Mild osteoarthritis at the right and left hip joint.  Intact pelvis  No acute foreign body.     This report was finalized on 2/23/2025 12:18 AM by Eamon Omalley MD.       CT Cervical Spine Without Contrast [[id number]] Collected: 02/23/25 0015     Updated: 02/23/25 0018    Narrative:      PROCEDURE: CT of the cervical spine performed without IV contrast on  February 22, 2025. The examination was performed with 2 mm axial imaging  and 2 mm sagittal coronal reconstruction images. Examination was  performed according to as low as reasonably achievable dose protocol.     HISTORY: Fall. Head injury. Neck injury.     COMPARISON: None.     FINDINGS:     Anatomic alignment of the cervical vertebral bodies with no acute  fracture or dislocation.  Normal thickness of the prevertebral soft tissues  No lytic or blastic lesion.       Impression:         No acute fracture or  dislocation.   No apical pneumothorax.     This report was finalized on 2/23/2025 12:16 AM by Eamon Omalley MD.       CT Head Without Contrast [[id number]] Collected: 02/23/25 0012     Updated: 02/23/25 0017    Narrative:      PROCEDURE: CT brain performed without IV contrast on February 22, 2025.  The examination was performed with 3 mm axial imaging and 3 mm sagittal  and coronal reconstruction images. The examination was performed  according to as low as reasonably achievable dose protocol.     HISTORY: Fall. Head injury. Neck injury.     COMPARISON: None.     FINDINGS:     Moderate generalized brain volume loss  Mild to moderate chronic small vessel ischemic type changes in the white  matter.  Deep white matter parenchymal volume loss with associated ex vacuo  dilatation of the lateral ventricles and basal cisterns.  No acute intracranial hemorrhage, mass, infarct, or edema.  Mild mucosal thickening in the ethmoid air cells.  High jugular bulb noted on the right side.  Debris noted within the right and left external auditory canal, right  greater than left.  Left side mastoiditis  Minimal mucosal thickening in the ethmoid air cells  No fracture.  No scalp hematoma.       Impression:      Impression:     Moderate generalized brain volume loss with mild to moderate chronic  small vessel ischemic type changes in the white matter.  Ex-vacuo dilatation of the ventricular system due to underlying deep  white matter parenchymal volume loss  No acute intracranial hemorrhage, mass, infarct, or edema.  Minimal mucosal thickening in the ethmoid air cells  Partial opacification of the left mastoid air cells.  No fracture or dislocation.  No scalp hematoma.     This report was finalized on 2/23/2025 12:15 AM by Eamon Omalley MD.               Last echocardiogram:  No previous echo on file.     I have personally reviewed the above radiology images and read the final radiology report on  02/23/25  ---------------------------------------------------------------------------------------------------------------------  Assessment / Plan     Active Hospital Problems    Diagnosis  POA    **Hip fracture [S72.009A]  Yes       ASSESSMENT/PLAN:  #Acute non-displaced left femoral neck fracture s/p mechanical fall at home prior to arrival   #Leukocytosis (POA), likely reactive   #Severe Alzheimer's dementia  #Hypercholesterolemia  #Hypertension  #GERD  #Osteoarthritis  #Depression and anxiety  #Advanced age  -Radiological images reviewed.   -Orthopedic surgery consulted, input/assistance is much appreciated.   -PRN IV/PO pain medication available with holding parameters to prevent hypotension, oversedation, and/or respiratory depression.   -Continuous pulse oximetry ordered.   -Neurovascular checks Q 4 hours.   -NPO pending surgical evaluation/intervention.  -Strict Bedrest.   -Fall precautions.   -May utilize external urinary catheter.   -No home medications to review as patient reportedly has been off all medications for the past 2 years. Spouse states that he does occasionally give her a THC gummy.   -Provide reorientation/redirection as necessary.   -Utilize bed alarm.  -PT/OT consults for postoperative evaluation.   -Case management consulted for assistance with discharge plans.   -VS currently stable; continue to closely monitor VS per hospital policy.   -Repeat labs in the AM (CBC and CMP). Obtain PT-INR.   -Preop EKG ordered with no concern for acute ischemia; revealed NSR/.  -Preop COVID-19 and Flu swab ordered, pending.      ----------  -DVT prophylaxis: SCD (Right leg ONLY).   -Activity: Bedrest.   -Expected length of stay:   INPATIENT status due to the need for care which can only be reasonably provided in an hospital setting such as aggressive/expedited ancillary services and/or consultation services, the necessity for IV medications, close physician monitoring and/or the possible need for  procedures.  In such, I feel patient's risk for adverse outcomes and need for care warrant INPATIENT evaluation and predict the patient's care encounter to likely last beyond 2 midnights.   -Disposition pending clinical course.     High risk secondary to acute non-displaced left femoral neck fracture status post mechanical fall at home prior to arrival, severe Alzheimer's dementia, and advanced age.     CODE STATUS: DNR/DNI, discussed with spouse at bedside in ED (H101).       HUBERT Andrea   02/23/25  03:27 EST  Pager #[phone]  ---------------------------------------------------------------------------------------------------------------------        Electronically signed by Heriberto Arteaga MD at 02/23/25 0500       Current Facility-Administered Medications   Medication Dose Route Frequency Provider Last Rate Last Admin    acetaminophen (TYLENOL) tablet 650 mg  650 mg Oral Q4H PRN Perry Cobb MD        Or    acetaminophen (TYLENOL) 160 MG/5ML oral solution 650 mg  650 mg Oral Q4H PRN Perry Cobb MD   650 mg at 02/25/25 0351    Or    acetaminophen (TYLENOL) suppository 650 mg  650 mg Rectal Q4H PRN Perry Cobb MD   650 mg at 02/23/25 1338    azithromycin (ZITHROMAX) tablet 250 mg  250 mg Oral Daily Deandre Hernandez MD   250 mg at 02/27/25 0844    sennosides-docusate (PERICOLACE) 8.6-50 MG per tablet 2 tablet  2 tablet Oral BID PRN Perry Cobb MD        And    polyethylene glycol (MIRALAX) packet 17 g  17 g Oral Daily PRN Perry Cobb MD   17 g at 02/27/25 0844    And    bisacodyl (DULCOLAX) EC tablet 5 mg  5 mg Oral Daily PRN Perry Cobb MD        And    bisacodyl (DULCOLAX) suppository 10 mg  10 mg Rectal Daily PRN Perry Cobb MD        Calcium Replacement - Follow Nurse / BPA Driven Protocol   Not Applicable PRN Deandre Hernandez MD        cefdinir (OMNICEF) capsule 300 mg  300 mg Oral Q12H Deandre Hernandez MD   300 mg at 02/27/25 0844    Enoxaparin Sodium (LOVENOX) syringe 40 mg  40 mg  Subcutaneous Q24H Perry Cobb MD   40 mg at 02/27/25 0844    HYDROcodone-acetaminophen (NORCO) 5-325 MG per tablet 1 tablet  1 tablet Oral Q6H PRN Deandre Hernandez MD        Magnesium Standard Dose Replacement - Follow Nurse / BPA Driven Protocol   Not Applicable Deandre Szymanski MD        Phosphorus Replacement - Follow Nurse / BPA Driven Protocol   Not Applicable PRN Deandre Hernandez MD        Potassium Replacement - Follow Nurse / BPA Driven Protocol   Not Applicable PRDeandre Clancy MD        ropivacaine 0.5 % 250 mg, Morphine 10 mg, cloNIDine 80 mcg, ketorolac 30 mg, EPINEPHrine 1 mg/mL 0.5 mg in sodium chloride 0.9 % 50 mL solution   Intra-articular Once Perry Cobb MD        sodium chloride 0.9 % flush 10 mL  10 mL Intravenous PRN Perry Cobb MD        sodium chloride 0.9 % flush 10 mL  10 mL Intravenous Q12H Perry Cobb MD   10 mL at 02/27/25 0844    sodium chloride 0.9 % flush 10 mL  10 mL Intravenous PRN Perry Cobb MD        sodium chloride 0.9 % infusion 40 mL  40 mL Intravenous PRN Perry Cobb MD            Physician Progress Notes (most recent note)        Ivette Gandhi APRN at 02/26/25 1036       Attestation signed by Perry Cobb MD at 02/26/25 1922    I have reviewed this documentation and agree.                       LOS: 3 days     Chief Complaint: Left hip fracture    Subjective     Interval History:   Postoperative day 3 left hip hemiarthroplasty.  Patient asleep in bed, eyes closed.  NAD.  Spouse at bedside.  Providing information.  No acute events reported overnight.  Patient was able to sit on edge of bed yesterday and stand with physical therapy assistance.  Patient currently on antibiotics secondary to UTI.      Objective     Vital Signs  Temp:  [98.5 °F (36.9 °C)-98.9 °F (37.2 °C)] 98.5 °F (36.9 °C)  Heart Rate:  [] 93  Resp:  [18] 18  BP: (117-131)/(54-64) 131/64    Labs & Rads  Lab Results (last 72 hours)       Procedure Component Value Units Date/Time    Blood  Culture - Blood, Arm, Left [[id number]]  (Normal) Collected: 02/24/25 1441    Specimen: Blood from Arm, Left Updated: 02/25/25 1501     Blood Culture No growth at 24 hours    Blood Culture - Blood, Arm, Left [[id number]]  (Normal) Collected: 02/24/25 1403    Specimen: Blood from Arm, Left Updated: 02/25/25 1415     Blood Culture No growth at 24 hours    Urine Culture - Urine, Urine, Clean Catch [[id number]]  (Abnormal)  (Susceptibility) Collected: 02/23/25 0537    Specimen: Urine, Clean Catch Updated: 02/25/25 1035     Urine Culture >100,000 CFU/mL Escherichia coli    Narrative:      Colonization of the urinary tract without infection is common. Treatment is discouraged unless the patient is symptomatic, pregnant, or undergoing an invasive urologic procedure.    Susceptibility        Escherichia coli      JANICE      Amoxicillin + Clavulanate Susceptible      Ampicillin Susceptible      Ampicillin + Sulbactam Susceptible      Cefazolin (Urine) Susceptible      Cefepime Susceptible      Ceftazidime Susceptible      Ceftriaxone Susceptible      Gentamicin Susceptible      Levofloxacin Susceptible      Nitrofurantoin Susceptible      Piperacillin + Tazobactam Susceptible      Trimethoprim + Sulfamethoxazole Susceptible                           Calcium, Ionized [[id number]]  (Abnormal) Collected: 02/25/25 0752    Specimen: Blood Updated: 02/25/25 0813     Ionized Calcium 1.08 mmol/L     Basic Metabolic Panel [[id number]]  (Abnormal) Collected: 02/25/25 0239    Specimen: Blood Updated: 02/25/25 0348     Glucose 133 mg/dL      BUN 12 mg/dL      Creatinine 0.78 mg/dL      Sodium 136 mmol/L      Potassium 3.7 mmol/L      Chloride 105 mmol/L      CO2 23.2 mmol/L      Calcium 8.0 mg/dL      BUN/Creatinine Ratio 15.4     Anion Gap 7.8 mmol/L      eGFR 75.5 mL/min/1.73     Narrative:      GFR Categories in Chronic Kidney Disease (CKD)      GFR Category          GFR (mL/min/1.73)    Interpretation  G1                     90 or greater          Normal or high (1)  G2                      60-89                Mild decrease (1)  G3a                   45-59                Mild to moderate decrease  G3b                   30-44                Moderate to severe decrease  G4                    15-29                Severe decrease  G5                    14 or less           Kidney failure          (1)In the absence of evidence of kidney disease, neither GFR category G1 or G2 fulfill the criteria for CKD.    eGFR calculation 2021 CKD-EPI creatinine equation, which does not include race as a factor    CBC & Differential [[id number]]  (Abnormal) Collected: 02/25/25 0239    Specimen: Blood Updated: 02/25/25 0318    Narrative:      The following orders were created for panel order CBC & Differential.  Procedure                               Abnormality         Status                     ---------                               -----------         ------                     CBC Auto Differential[[id number]]        Abnormal            Final result                 Please view results for these tests on the individual orders.    CBC Auto Differential [[id number]]  (Abnormal) Collected: 02/25/25 0239    Specimen: Blood Updated: 02/25/25 0318     WBC 12.69 10*3/mm3      RBC 3.41 10*6/mm3      Hemoglobin 10.7 g/dL      Hematocrit 33.4 %      MCV 97.9 fL      MCH 31.4 pg      MCHC 32.0 g/dL      RDW 12.9 %      RDW-SD 46.0 fl      MPV 9.0 fL      Platelets 203 10*3/mm3      Neutrophil % 81.8 %      Lymphocyte % 10.4 %      Monocyte % 5.8 %      Eosinophil % 1.1 %      Basophil % 0.4 %      Immature Grans % 0.5 %      Neutrophils, Absolute 10.38 10*3/mm3      Lymphocytes, Absolute 1.32 10*3/mm3      Monocytes, Absolute 0.74 10*3/mm3      Eosinophils, Absolute 0.14 10*3/mm3      Basophils, Absolute 0.05 10*3/mm3      Immature Grans, Absolute 0.06 10*3/mm3      nRBC 0.0 /100 WBC     Procalcitonin [[id number]]  (Abnormal) Collected: 02/24/25 1004    Specimen: Blood Updated:  "02/24/25 1412     Procalcitonin 1.31 ng/mL     Narrative:      As a Marker for Sepsis (Non-Neonates):    1. <0.5 ng/mL represents a low risk of severe sepsis and/or septic shock.  2. >2 ng/mL represents a high risk of severe sepsis and/or septic shock.    As a Marker for Lower Respiratory Tract Infections that require antibiotic therapy:    PCT on Admission    Antibiotic Therapy       6-12 Hrs later    >0.5                Strongly Recommended  >0.25 - <0.5        Recommended   0.1 - 0.25          Discouraged              Remeasure/reassess PCT  <0.1                Strongly Discouraged     Remeasure/reassess PCT    As 28 day mortality risk marker: \"Change in Procalcitonin Result\" (>80% or <=80%) if Day 0 (or Day 1) and Day 4 values are available. Refer to [email or web address]    Change in PCT <=80%  A decrease of PCT levels below or equal to 80% defines a positive change in PCT test result representing a higher risk for 28-day all-cause mortality of patients diagnosed with severe sepsis for septic shock.    Change in PCT >80%  A decrease of PCT levels of more than 80% defines a negative change in PCT result representing a lower risk for 28-day all-cause mortality of patients diagnosed with severe sepsis or septic shock.       Manual Differential [[id number]]  (Abnormal) Collected: 02/23/25 1536    Specimen: Blood Updated: 02/23/25 1800     Neutrophil % 86.0 %      Lymphocyte % 7.0 %      Monocyte % 5.0 %      Basophil % 1.0 %      Myelocyte % 1.0 %      Neutrophils Absolute 14.51 10*3/mm3      Lymphocytes Absolute 1.18 10*3/mm3      Monocytes Absolute 0.84 10*3/mm3      Basophils Absolute 0.17 10*3/mm3      RBC Morphology Normal     Platelet Estimate Adequate    Respiratory Panel PCR w/COVID-19(SARS-CoV-2) HUNG/LANDEN/CHEMA/PAD/COR/DINO In-House, NP Swab in UTM/VTM, 2 HR TAT - Swab, Nasopharynx [[id number]]  (Normal) Collected: 02/23/25 1644    Specimen: Swab from Nasopharynx Updated: 02/23/25 1750     " ADENOVIRUS, PCR Not Detected     Coronavirus 229E Not Detected     Coronavirus HKU1 Not Detected     Coronavirus NL63 Not Detected     Coronavirus OC43 Not Detected     COVID19 Not Detected     Human Metapneumovirus Not Detected     Human Rhinovirus/Enterovirus Not Detected     Influenza A PCR Not Detected     Influenza B PCR Not Detected     Parainfluenza Virus 1 Not Detected     Parainfluenza Virus 2 Not Detected     Parainfluenza Virus 3 Not Detected     Parainfluenza Virus 4 Not Detected     RSV, PCR Not Detected     Bordetella pertussis pcr Not Detected     Bordetella parapertussis PCR Not Detected     Chlamydophila pneumoniae PCR Not Detected     Mycoplasma pneumo by PCR Not Detected    Narrative:      In the setting of a positive respiratory panel with a viral infection PLUS a negative procalcitonin without other underlying concern for bacterial infection, consider observing off antibiotics or discontinuation of antibiotics and continue supportive care. If the respiratory panel is positive for atypical bacterial infection (Bordetella pertussis, Chlamydophila pneumoniae, or Mycoplasma pneumoniae), consider antibiotic de-escalation to target atypical bacterial infection.    Comprehensive Metabolic Panel [[id number]]  (Abnormal) Collected: 02/23/25 1536    Specimen: Blood Updated: 02/23/25 1714     Glucose 118 mg/dL      BUN 12 mg/dL      Creatinine 0.81 mg/dL      Sodium 137 mmol/L      Potassium 4.3 mmol/L      Comment: Slight hemolysis detected by analyzer. Result may be falsely elevated.        Chloride 103 mmol/L      CO2 22.9 mmol/L      Calcium 7.9 mg/dL      Total Protein 5.0 g/dL      Albumin 3.3 g/dL      ALT (SGPT) 11 U/L      AST (SGOT) 18 U/L      Alkaline Phosphatase 70 U/L      Total Bilirubin 0.4 mg/dL      Globulin 1.7 gm/dL      A/G Ratio 1.9 g/dL      BUN/Creatinine Ratio 14.8     Anion Gap 11.1 mmol/L      eGFR 72.1 mL/min/1.73     Narrative:      GFR Categories in Chronic Kidney Disease  (CKD)      GFR Category          GFR (mL/min/1.73)    Interpretation  G1                     90 or greater         Normal or high (1)  G2                      60-89                Mild decrease (1)  G3a                   45-59                Mild to moderate decrease  G3b                   30-44                Moderate to severe decrease  G4                    15-29                Severe decrease  G5                    14 or less           Kidney failure          (1)In the absence of evidence of kidney disease, neither GFR category G1 or G2 fulfill the criteria for CKD.    eGFR calculation 2021 CKD-EPI creatinine equation, which does not include race as a factor    CBC & Differential [[id number]]  (Abnormal) Collected: 02/23/25 1536    Specimen: Blood Updated: 02/23/25 1659    Narrative:      The following orders were created for panel order CBC & Differential.  Procedure                               Abnormality         Status                     ---------                               -----------         ------                     CBC Auto Differential[[id number]]        Abnormal            Final result               Scan Slide[[id number]]                                                                    Please view results for these tests on the individual orders.    CBC Auto Differential [[id number]]  (Abnormal) Collected: 02/23/25 1536    Specimen: Blood Updated: 02/23/25 1659     WBC 16.87 10*3/mm3      RBC 3.57 10*6/mm3      Hemoglobin 11.4 g/dL      Hematocrit 34.6 %      MCV 96.9 fL      MCH 31.9 pg      MCHC 32.9 g/dL      RDW 13.1 %      RDW-SD 47.0 fl      MPV 9.6 fL      Platelets 190 10*3/mm3     Blood Gas, Arterial With Co-Ox [[id number]]  (Abnormal) Collected: 02/23/25 1602    Specimen: Arterial Blood Updated: 02/23/25 1603     Site Right Radial     Samy's Test Positive     pH, Arterial 7.397 pH units      pCO2, Arterial 40.0 mm Hg      pO2, Arterial 55.8 mm Hg      Comment: 84 Value below reference  range        HCO3, Arterial 24.6 mmol/L      Base Excess, Arterial -0.2 mmol/L      O2 Saturation, Arterial 91.4 %      Comment: 84 Value below reference range        Hemoglobin, Blood Gas 10.7 g/dL      Comment: 84 Value below reference range        Hematocrit, Blood Gas 32.8 %      Comment: 84 Value below reference range        Oxyhemoglobin 89.0 %      Comment: 84 Value below reference range        Methemoglobin 0.50 %      Carboxyhemoglobin 2.1 %      A-a DO2 42.1 mmHg      CO2 Content 25.8 mmol/L      Barometric Pressure for Blood Gas 728 mmHg      Modality Room Air     FIO2 21 %      Ventilator Mode NA     Collected by [id number]     Comment: Meter: [id number]     :  [id number]        pH, Temp Corrected --     pCO2, Temperature Corrected --     pO2, Temperature Corrected --          Imaging Results (Last 72 Hours)       Procedure Component Value Units Date/Time    XR Pelvis 1 or 2 View [[id number]] Collected: 02/24/25 0829     Updated: 02/24/25 0832    Narrative:      EXAM:    XR Pelvis, 1 or 2 Views     EXAM DATE:    2/23/2025 11:03 AM     CLINICAL HISTORY:    s/p left hip lexi, in pacu; S72.002A Fracture of unspecified part of  neck of left femur, initial encounter for closed fracture; S72.002A  Fracture of unspecified part of neck of left femur, initial encounter  for closed fracture     TECHNIQUE:    Frontal view of the pelvis.     COMPARISON:    2/22/2025     FINDINGS:    BONES/JOINTS:  Left total hip arthroplasty.  Components appear well  seated.  No acute fracture.  No dislocation.    SOFT TISSUES:  Unremarkable as visualized.       Impression:        Left total hip arthroplasty. Components appear well seated.     This report was finalized on 2/24/2025 8:30 AM by Dr. Bradley Rausch MD.       XR Chest 1 View [[id number]] Collected: 02/23/25 1938     Updated: 02/1940    Narrative:      PROCEDURE: X-ray examination of the chest performed on February 23, 2025. Single view. Upright position.      HISTORY: Hypoxia.     COMPARISON: None.     FINDINGS:     Mild enlarged heart size  Coarsened bronchovascular pattern to the lungs  Mild atelectasis at the lung bases  Left lower lobe pneumonia.  No pleural effusion or pneumothorax.  Mild dextroconvex curve at the mid thoracic spine.  Azygos lobe variant in the right upper lobe  No lytic or blastic lesion.  No free air in the upper abdomen.       Impression:         Mild enlarged heart size.  Coarsened bronchovascular pattern to the lungs  Possible left lower lobe pneumonia.  Azygos lobe variant in the right upper lobe  No fracture or dislocation.     This report was finalized on 2/23/2025 7:39 PM by Eamon Omalley MD.               Physical Exam:   Physical Exam  Vitals reviewed.   Constitutional:       General: She is not in acute distress.     Comments: Patient currently sleeping, eyes closed.  NAD.  Family member at bedside.   HENT:      Head: Normocephalic.   Cardiovascular:      Pulses: Normal pulses.   Pulmonary:      Effort: Pulmonary effort is normal. No respiratory distress.   Musculoskeletal:      Comments: Dressing CDI left hip.  Thigh supple.  No sign of hematoma.+PP   Skin:     General: Skin is warm and dry.   Neurological:      Mental Status: Mental status is at baseline.   Psychiatric:         Behavior: Behavior normal.          Results Review:     I reviewed the patient's new clinical results.      Assessment & Plan     Principal Problem:    Hip fracture    Postoperative day 3 left hip hemiarthroplasty secondary to femoral neck fracture    PT/OT: WBAT with assistive device, anterior hip precautions, no active abduction.    Wound care: Maintain dressing at this time may reinforce as needed for saturation.    DVT/PPx: Lovenox 40 mg subcu daily x 14 days.    Make follow-up appointment orthopedic office in 2 weeks upon discharge.    Hospitalist attending, providing medical management.      Plan for disposition: TBD hospitalist.     Ivette Gandhi,  HUBERT  02/26/25  10:37 EST          Electronically signed by Perry Cobb MD at 02/26/25 1922          Consult Notes (most recent note)        Perry Cobb MD at 02/23/25 0906        Consult Orders    1. Inpatient Orthopedic Surgery Consult [[id number]] ordered by Aysha Cardoza APRN at 02/23/25 0153                 Consult Note    Reason for Consultation: Left hip fracture    Chief complaint left hip pain x 1 day    History of present illness: History of Present Illness    The patient is an 84-year-old who has left hip pain after a fall from standing height.  She was ambulating in her kitchen last night with her significant other tripped and fell onto her left side.  She has left hip pain.  She does ambulate with a walker and a cane and with assistance by her significant other.  The significant other reports significant pain but pain has improved since IV pain Bacid.  She has no previous history of a surgery or pain to the left hip.    The patient was minimally verbal during this encounter.  Majority of the history was obtained from the nursing staff and the significant other.      Review of Systems  A complete review of systems was completed and was negative except for what is noted in the HPI.    History  History reviewed. No pertinent past medical history., History reviewed. No pertinent surgical history., History reviewed. No pertinent family history.,   Social History     Tobacco Use    Smoking status: Never     Passive exposure: Past    Smokeless tobacco: Never   Vaping Use    Vaping status: Never Used   Substance Use Topics    Alcohol use: Never    Drug use: Never   ,   No medications prior to admission.   , Scheduled Meds:  cefTRIAXone, 1,000 mg, Intravenous, Q24H  lactated ringers, 125 mL/hr, Intravenous, Once  [Transfer Hold] ropivacaine 0.5 % 250 mg, Morphine 10 mg, cloNIDine 80 mcg, ketorolac 30 mg, EPINEPHrine 1 mg/mL 0.5 mg in sodium chloride 0.9 % 50 mL solution, , Intra-articular, Once  sodium  chloride, 10 mL, Intravenous, Q12H  sodium chloride, 10 mL, Intravenous, Q12H    , Continuous Infusions:   , PRN Meds:    acetaminophen **OR** acetaminophen **OR** acetaminophen    senna-docusate sodium **AND** polyethylene glycol **AND** bisacodyl **AND** bisacodyl    HYDROcodone-acetaminophen    midazolam    Morphine **AND** naloxone    [COMPLETED] Insert Peripheral IV **AND** sodium chloride    sodium chloride    sodium chloride    sodium chloride    sodium chloride and Allergies:  Sulfa antibiotics    Objective     Vital Signs   Temp:  [97.8 °F (36.6 °C)-98.6 °F (37 °C)] 97.8 °F (36.6 °C)  Heart Rate:  [] 98  Resp:  [17-18] 17  BP: (115-167)/(54-89) 132/64    Physical Exam:  Constitutional:  Alert. Well developed and well nourished. No acute distress.      HENT:  Head: Normocephalic and atraumatic.  Mouth:  Moist mucous membranes.    Eyes:  Conjunctivae and EOM are normal.  No scleral icterus.  Neck:  Neck supple.  No JVD present.   Cardiovascular:  +2 radial, +2 dorsalis pedis, +2 posterior tibialis bilaterally   Pulmonary/Chest:  No respiratory distress. Adequate volumes noted.   Abdominal:  Soft.  No distension and no tenderness.   Musculoskeletal:     Spine- No c/t/l/s spine tenderness, Cervical ROM without pain   RUE- Painless ROM shoulder/elbow/wrist/fingers, no edema, no ecchymosis, no gross deformity, no open injury   LUE- Painless ROM shoulder/elbow/wrist/fingers, no edema, no ecchymosis, no gross deformity, no open injury   RLE- Painless ROM hip/knee/ankle, no edema, no ecchymosis, no gross deformity, no open injury   LLE-shortened externally rotated, positive logroll and axial load   Pelvis- negative pelvic rock   Neurological: SILT Ax/LABC/m/r/u, able to perform shoulder abduction/elbow flex-ext/wrist flex-ext/finger-thumb flex-ext-abd-add, SILT s/s/dp/sp/t, able to perform ankle DF/PF/EHL  Skin:  Skin is warm and dry.  No rash noted.  No pallor.     Labs:    Lab Results (last 72 hours)        Procedure Component Value Units Date/Time    Urine Culture - Urine, Urine, Clean Catch [[id number]] Collected: 02/23/25 0537    Specimen: Urine, Clean Catch Updated: 02/23/25 0839    Slemp Urine Culture Tube - Urine, Clean Catch [[id number]] Collected: 02/23/25 0537    Specimen: Urine, Clean Catch Updated: 02/23/25 0603     Extra Tube Hold for add-ons.     Comment: Auto resulted.       Urinalysis, Microscopic Only - Urine, Clean Catch [[id number]]  (Abnormal) Collected: 02/23/25 0537    Specimen: Urine, Clean Catch Updated: 02/23/25 0602     RBC, UA None Seen /HPF      WBC, UA 6-10 /HPF      Bacteria, UA 4+ /HPF      Squamous Epithelial Cells, UA 0-2 /HPF      Hyaline Casts, UA None Seen /LPF      Methodology Manual Light Microscopy    Urinalysis With Microscopic If Indicated (No Culture) - Urine, Clean Catch [[id number]]  (Abnormal) Collected: 02/23/25 0537    Specimen: Urine, Clean Catch Updated: 02/23/25 0558     Color, UA Yellow     Appearance, UA Clear     pH, UA 8.0     Specific Gravity, UA 1.018     Glucose, UA Negative     Ketones, UA Negative     Bilirubin, UA Negative     Blood, UA Negative     Protein, UA Negative     Leuk Esterase, UA Small (1+)     Nitrite, UA Positive     Urobilinogen, UA 1.0 E.U./dL    Comprehensive Metabolic Panel [[id number]]  (Abnormal) Collected: 02/23/25 0508    Specimen: Blood Updated: 02/23/25 0534     Glucose 147 mg/dL      BUN 12 mg/dL      Creatinine 0.77 mg/dL      Sodium 137 mmol/L      Potassium 3.9 mmol/L      Chloride 102 mmol/L      CO2 25.5 mmol/L      Calcium 8.7 mg/dL      Total Protein 6.6 g/dL      Albumin 4.1 g/dL      ALT (SGPT) 12 U/L      AST (SGOT) 17 U/L      Alkaline Phosphatase 93 U/L      Total Bilirubin 0.4 mg/dL      Globulin 2.5 gm/dL      A/G Ratio 1.6 g/dL      BUN/Creatinine Ratio 15.6     Anion Gap 9.5 mmol/L      eGFR 76.6 mL/min/1.73     Narrative:      GFR Categories in Chronic Kidney Disease (CKD)      GFR Category          GFR (mL/min/1.73)     Interpretation  G1                     90 or greater         Normal or high (1)  G2                      60-89                Mild decrease (1)  G3a                   45-59                Mild to moderate decrease  G3b                   30-44                Moderate to severe decrease  G4                    15-29                Severe decrease  G5                    14 or less           Kidney failure          (1)In the absence of evidence of kidney disease, neither GFR category G1 or G2 fulfill the criteria for CKD.    eGFR calculation 2021 CKD-EPI creatinine equation, which does not include race as a factor    Protime-INR [[id number]]  (Normal) Collected: 02/23/25 0508    Specimen: Blood Updated: 02/23/25 0522     Protime 13.0 Seconds      INR 0.97    Narrative:      Suggested INR therapeutic range for stable oral anticoagulant therapy:    Low Intensity therapy:   1.5-2.0  Moderate Intensity therapy:   2.0-3.0  High Intensity therapy:   2.5-4.0    CBC Auto Differential [[id number]]  (Abnormal) Collected: 02/23/25 0508    Specimen: Blood Updated: 02/23/25 0513     WBC 14.15 10*3/mm3      RBC 4.41 10*6/mm3      Hemoglobin 13.8 g/dL      Hematocrit 42.5 %      MCV 96.4 fL      MCH 31.3 pg      MCHC 32.5 g/dL      RDW 12.6 %      RDW-SD 45.2 fl      MPV 8.7 fL      Platelets 247 10*3/mm3      Neutrophil % 85.2 %      Lymphocyte % 8.6 %      Monocyte % 5.2 %      Eosinophil % 0.0 %      Basophil % 0.4 %      Immature Grans % 0.6 %      Neutrophils, Absolute 12.06 10*3/mm3      Lymphocytes, Absolute 1.21 10*3/mm3      Monocytes, Absolute 0.74 10*3/mm3      Eosinophils, Absolute 0.00 10*3/mm3      Basophils, Absolute 0.06 10*3/mm3      Immature Grans, Absolute 0.08 10*3/mm3      nRBC 0.0 /100 WBC     TSH [[id number]]  (Normal) Collected: 02/23/25 0155    Specimen: Blood from Arm, Left Updated: 02/23/25 0458     TSH 1.800 uIU/mL     Magnesium [[id number]]  (Normal) Collected: 02/23/25 0155    Specimen: Blood from Arm, Left  Updated: 02/23/25 0445     Magnesium 2.3 mg/dL     Comprehensive Metabolic Panel [[id number]]  (Abnormal) Collected: 02/23/25 0155    Specimen: Blood from Arm, Left Updated: 02/23/25 0222     Glucose 171 mg/dL      BUN 14 mg/dL      Creatinine 0.81 mg/dL      Sodium 136 mmol/L      Potassium 3.8 mmol/L      Chloride 101 mmol/L      CO2 23.9 mmol/L      Calcium 9.1 mg/dL      Total Protein 7.0 g/dL      Albumin 4.1 g/dL      ALT (SGPT) 12 U/L      AST (SGOT) 16 U/L      Alkaline Phosphatase 100 U/L      Total Bilirubin 0.4 mg/dL      Globulin 2.9 gm/dL      A/G Ratio 1.4 g/dL      BUN/Creatinine Ratio 17.3     Anion Gap 11.1 mmol/L      eGFR 72.1 mL/min/1.73     Narrative:      GFR Categories in Chronic Kidney Disease (CKD)      GFR Category          GFR (mL/min/1.73)    Interpretation  G1                     90 or greater         Normal or high (1)  G2                      60-89                Mild decrease (1)  G3a                   45-59                Mild to moderate decrease  G3b                   30-44                Moderate to severe decrease  G4                    15-29                Severe decrease  G5                    14 or less           Kidney failure          (1)In the absence of evidence of kidney disease, neither GFR category G1 or G2 fulfill the criteria for CKD.    eGFR calculation 2021 CKD-EPI creatinine equation, which does not include race as a factor    CBC & Differential [[id number]]  (Abnormal) Collected: 02/23/25 0155    Specimen: Blood from Arm, Left Updated: 02/23/25 0201    Narrative:      The following orders were created for panel order CBC & Differential.  Procedure                               Abnormality         Status                     ---------                               -----------         ------                     CBC Auto Differential[[id number]]        Abnormal            Final result                 Please view results for these tests on the individual orders.    CBC  Auto Differential [[id number]]  (Abnormal) Collected: 02/23/25 0155    Specimen: Blood from Arm, Left Updated: 02/23/25 0201     WBC 16.27 10*3/mm3      RBC 4.59 10*6/mm3      Hemoglobin 14.2 g/dL      Hematocrit 43.7 %      MCV 95.2 fL      MCH 30.9 pg      MCHC 32.5 g/dL      RDW 12.6 %      RDW-SD 44.5 fl      MPV 8.7 fL      Platelets 258 10*3/mm3      Neutrophil % 89.9 %      Lymphocyte % 5.1 %      Monocyte % 4.4 %      Eosinophil % 0.0 %      Basophil % 0.2 %      Immature Grans % 0.4 %      Neutrophils, Absolute 14.62 10*3/mm3      Lymphocytes, Absolute 0.83 10*3/mm3      Monocytes, Absolute 0.71 10*3/mm3      Eosinophils, Absolute 0.00 10*3/mm3      Basophils, Absolute 0.04 10*3/mm3      Immature Grans, Absolute 0.07 10*3/mm3      nRBC 0.0 /100 WBC     [place] Draw [[id number]] Collected: 02/23/25 0155    Specimen: Blood from Arm, Left Updated: 02/23/25 0200    Narrative:      The following orders were created for panel order [place] Draw.  Procedure                               Abnormality         Status                     ---------                               -----------         ------                     Green Top (Gel)[[id number]]                                  Final result               Lavender Top[[id number]]                                     Final result               Gold Top - SST[[id number]]                                   Final result               Light Blue Top[[id number]]                                   Final result                 Please view results for these tests on the individual orders.    Green Top (Gel) [[id number]] Collected: 02/23/25 0155    Specimen: Blood from Arm, Left Updated: 02/23/25 0200     Extra Tube Hold for add-ons.     Comment: Auto resulted.       Lavender Top [[id number]] Collected: 02/23/25 0155    Specimen: Blood from Arm, Left Updated: 02/23/25 0200     Extra Tube hold for add-on     Comment: Auto resulted       Gold Top - SST [[id number]] Collected: 02/23/25  015    Specimen: Blood from Arm, Left Updated: 25 0200     Extra Tube Hold for add-ons.     Comment: Auto resulted.       Light Blue Top [[id number]] Collected: 25    Specimen: Blood from Arm, Left Updated: 25 0200     Extra Tube Hold for add-ons.     Comment: Auto resulted               Images:    Left hip and femur x-rays reviewed.  Femoral neck fracture displaced as noted.  No other fracture.  Mild arthritic changes at the hip joint.       Assessment  Left femoral neck fracture, displaced    Plan    Due to the patient's acute nature of her injury and ambulatory status I would recommend a left hip hemiarthroplasty.    This was discussed significantly with the patient's significant other.    Risk benefits alternatives and complications was discussed with the patient prior to surgery as well as the patient's significant other.    Maintain n.p.o. status for now.  Anticipate surgery 2025.      Perry Cobb MD  25  09:07 EST      Electronically signed by Perry Cobb MD at 25 0916          Physical Therapy Notes (most recent note)        Barbara De La Cruz, PT at 25 1525  Version 3 of 3         Acute Care - Physical Therapy Treatment Note  ESTELLA Brock     Patient Name: Crista Blunt  : 1940  MRN: [id number]  Today's Date: 2025   Onset of Illness/Injury or Date of Surgery: 25  Visit Dx:     ICD-10-CM ICD-9-CM   1. Closed fracture of neck of left femur, initial encounter  S72.002A 820.8   2. Closed fracture of left hip, initial encounter  S72.002A 820.8     Patient Active Problem List   Diagnosis    Hip fracture     History reviewed. No pertinent past medical history.  History reviewed. No pertinent surgical history.  PT Assessment (Last 12 Hours)       PT Evaluation and Treatment       Row Name 25 1500          Physical Therapy Time and Intention    Document Type therapy note (daily note)  -     Mode of Treatment physical therapy  -     Patient  Effort fair  -     Comment Pt seen for treatment this PM, pleasant and cooperative with therapy.  Pt assisted to sit EOB, attempt 3-4 STS with grossly max/depA, some ankle pumps and encouraged other TE  -       Row Name 02/26/25 1500          Bed Mobility    Bed Mobility supine-sit;sit-supine  -     Supine-Sit [place] (Bed Mobility) dependent (less than 25% patient effort)  -     Sit-Supine [place] (Bed Mobility) dependent (less than 25% patient effort)  -     Comment, (Bed Mobility) able to sit EOB however pt had posterior lean  -       Row Name 02/26/25 1500          Transfers    Transfers sit-stand transfer;stand-sit transfer  -       Row Name 02/26/25 1500          Sit-Stand Transfer    Sit-Stand [place] (Transfers) dependent (less than 25% patient effort);2 person assist  -       Row Name 02/26/25 1500          Stand-Sit Transfer    Stand-Sit [place] (Transfers) dependent (less than 25% patient effort);2 person assist  -       Row Name 02/26/25 1500          Gait/Stairs (Locomotion)    Patient was able to Ambulate no, other medical factors prevent ambulation  -     Reason Patient was unable to Ambulate Other (Comment);Cognitive Deficit/Severe Dementia  unsafe  -       Row Name 02/26/25 1500          Motor Skills    Therapeutic Exercise hip;ankle  attempted hip add, ankle DF/PF with multiple repetitions  -       Row Name             Wound 02/23/25 0959 Left anterior thigh    Wound - Properties Group Placement Date: 02/23/25  -JG Placement Time: 0959 -JG Side: Left  -JG Orientation: anterior  -JG Location: thigh  -JG    Retired Wound - Properties Group Placement Date: 02/23/25  -JG Placement Time: 0959  -JG Side: Left  -JG Orientation: anterior  -JG Location: thigh  -JG    Retired Wound - Properties Group Placement Date: 02/23/25  -JG Placement Time: 0959 -JG Side: Left  -JG Orientation: anterior  -JG Location: thigh  -JG    Retired Wound - Properties Group Date first  assessed: 02/23/25  -JG Time first assessed: 0959 -JG Side: Left  -JG Location: thigh  -JG      Row Name 02/26/25 1500          Positioning and Restraints    Pre-Treatment Position in bed  -     Post Treatment Position bed  -KH     In Bed supine;with family/caregiver;exit alarm on  -               User Key  (r) = Recorded By, (t) = Taken By, (c) = Cosigned By      Initials Name Provider Type    Kristin Avilez, RN Registered Nurse    Barbara Galarza, PT Physical Therapist                    Physical Therapy Education        No education to display                  PT Recommendation and Plan     Progress Summary (PT)  Daily Progress Summary (PT): Pt seen for treatment this date, confusion limits pt participation; grossly max/depA at this time, no change to POC. Prognosis guarded, recommend SNF       Time Calculation:    PT Charges       Row Name 02/26/25 1515             Time Calculation    Start Time 1325  -KH      PT Received On 02/26/25  -         Time Calculation- PT    Total Timed Code Minutes- PT 23 minute(s)  -                User Key  (r) = Recorded By, (t) = Taken By, (c) = Cosigned By      Initials Name Provider Type    Barbara Galarza, PT Physical Therapist                  Therapy Charges for Today       Code Description Service Date Service Provider Modifiers Qty    30539142799 HC PT THER PROC EA 15 MIN 2/25/2025 Barbara De La Cruz, PT GP 1    26156056835 HC PT THERAPEUTIC ACT EA 15 MIN 2/25/2025 Barbara De La Cruz, PT GP 1    57338483577 HC PT THER PROC EA 15 MIN 2/26/2025 Barbara De La Cruz, PT GP 1    73660787418 HC PT THERAPEUTIC ACT EA 15 MIN 2/26/2025 Barbara De La Cruz, PT GP 1            PT G-Codes  AM-PAC 6 Clicks Score (PT): 6    Barbara De La Cruz PT  2/26/2025      Electronically signed by Barbara De La Cruz, PT at 02/26/25 1532       Barbara De La Cruz PT at 02/26/25 1525  Version 2 of 3         Acute Care - Physical Therapy Treatment Note  ESTELLA Brock     Patient Name: Crista  Blunt  : 1940  MRN: [id number]  Today's Date: 2025   Onset of Illness/Injury or Date of Surgery: 25  Visit Dx:     ICD-10-CM ICD-9-CM   1. Closed fracture of neck of left femur, initial encounter  S72.002A 820.8   2. Closed fracture of left hip, initial encounter  S72.002A 820.8     Patient Active Problem List   Diagnosis    Hip fracture     History reviewed. No pertinent past medical history.  History reviewed. No pertinent surgical history.  PT Assessment (Last 12 Hours)       PT Evaluation and Treatment       Row Name 25 1500          Physical Therapy Time and Intention    Document Type therapy note (daily note)  -     Mode of Treatment physical therapy  -     Patient Effort fair  -     Comment Pt seen for treatment this PM, pleasant and cooperative with therapy.  Pt assisted to sit EOB, attempt 3-4 STS with grossly max/depA, some ankle pumps and encouraged other TE  -       Row Name 25 1500          Bed Mobility    Bed Mobility supine-sit;sit-supine  -     Supine-Sit [place] (Bed Mobility) dependent (less than 25% patient effort)  -     Sit-Supine [place] (Bed Mobility) dependent (less than 25% patient effort)  -     Comment, (Bed Mobility) able to sit EOB however pt had posterior lean  -       Row Name 25 1500          Transfers    Transfers sit-stand transfer;stand-sit transfer  -       Row Name 25 1500          Sit-Stand Transfer    Sit-Stand [place] (Transfers) dependent (less than 25% patient effort);2 person assist  -       Row Name 25 1500          Stand-Sit Transfer    Stand-Sit [place] (Transfers) dependent (less than 25% patient effort);2 person assist  -       Row Name 25 1500          Gait/Stairs (Locomotion)    Patient was able to Ambulate no, other medical factors prevent ambulation  -     Reason Patient was unable to Ambulate Other (Comment);Cognitive Deficit/Severe Dementia  unsafe  -[facility]  Name 02/26/25 1500          Motor Skills    Therapeutic Exercise hip;ankle  attempted hip add, ankle DF/PF with multiple repetitions  -       Row Name             Wound 02/23/25 0959 Left anterior thigh    Wound - Properties Group Placement Date: 02/23/25  -JG Placement Time: 0959 -JG Side: Left  -JG Orientation: anterior  -JG Location: thigh  -JG    Retired Wound - Properties Group Placement Date: 02/23/25  -JG Placement Time: 0959 -JG Side: Left  -JG Orientation: anterior  -JG Location: thigh  -JG    Retired Wound - Properties Group Placement Date: 02/23/25  -JG Placement Time: 0959 -JG Side: Left  -JG Orientation: anterior  -JG Location: thigh  -JG    Retired Wound - Properties Group Date first assessed: 02/23/25  -JG Time first assessed: 0959 -JG Side: Left  -JG Location: thigh  -JG      Row Name 02/26/25 1500          Positioning and Restraints    Pre-Treatment Position in bed  -     Post Treatment Position bed  -     In Bed supine;with family/caregiver;exit alarm on  -               User Key  (r) = Recorded By, (t) = Taken By, (c) = Cosigned By      Initials Name Provider Type    Kristin Avilez, RN Registered Nurse    Barbara Galarza, HAYLEE Physical Therapist                    Physical Therapy Education        No education to display                  PT Recommendation and Plan     Progress Summary (PT)  Daily Progress Summary (PT): Pt seen for treatment this date, participation was good, confusion limits pt participation; grossly max/depA at this time, no change to POC. Prognosis guarded to fair.       Time Calculation:    PT Charges       Row Name 02/26/25 1515             Time Calculation    Start Time 1325  -KH      PT Received On 02/26/25  -         Time Calculation- PT    Total Timed Code Minutes- PT 23 minute(s)  -                User Key  (r) = Recorded By, (t) = Taken By, (c) = Cosigned By      Initials Name Provider Type    Barbara Galarza, HAYLEE Physical Therapist                   Therapy Charges for Today       Code Description Service Date Service Provider Modifiers Qty    67187809537 HC PT THER PROC EA 15 MIN 2025 Barbara De La Cruz, PT GP 1    77747318656 HC PT THERAPEUTIC ACT EA 15 MIN 2025 Barbara De La Cruz, PT GP 1    91212329511 HC PT THER PROC EA 15 MIN 2025 Barbara De La Cruz, PT GP 1    03185181946 HC PT THERAPEUTIC ACT EA 15 MIN 2025 Barbara De La Cruz, PT GP 1            PT G-Codes  AM-PAC 6 Clicks Score (PT): 6    Barbara De La Cruz PT  2025      Electronically signed by Barbara De La Cruz, PT at 25 1527       Barbara De La Cruz PT at 25 152  Version 1 of 3         Acute Care - Physical Therapy Treatment Note  ESTELLA Brock     Patient Name: Crista Blunt  : 1940  MRN: [id number]  Today's Date: 2025   Onset of Illness/Injury or Date of Surgery: 25  Visit Dx:     ICD-10-CM ICD-9-CM   1. Closed fracture of neck of left femur, initial encounter  S72.002A 820.8   2. Closed fracture of left hip, initial encounter  S72.002A 820.8     Patient Active Problem List   Diagnosis    Hip fracture     History reviewed. No pertinent past medical history.  History reviewed. No pertinent surgical history.  PT Assessment (Last 12 Hours)       PT Evaluation and Treatment       Row Name 25 1500          Physical Therapy Time and Intention    Document Type therapy note (daily note)  -KH     Mode of Treatment physical therapy  -KH     Patient Effort good  -TALI     Comment Pt seen for treatment this PM, pleasant and cooperative with therapy.  Pt assisted to sit EOB, attempt 3-4 STS with grossly max/depA, some ankle pumps and encouraged other TE  -KH       Row Name 25 1500          Bed Mobility    Bed Mobility supine-sit;sit-supine  -KH     Supine-Sit [place] (Bed Mobility) dependent (less than 25% patient effort)  -KH     Sit-Supine [place] (Bed Mobility) dependent (less than 25% patient effort)  -TALI     Comment, (Bed Mobility) able  to sit EOB however pt had posterior lean  -       Row Name 02/26/25 1500          Transfers    Transfers sit-stand transfer;stand-sit transfer  -       Row Name 02/26/25 1500          Sit-Stand Transfer    Sit-Stand [place] (Transfers) dependent (less than 25% patient effort);2 person assist  -       Row Name 02/26/25 1500          Stand-Sit Transfer    Stand-Sit [place] (Transfers) dependent (less than 25% patient effort);2 person assist  -       Row Name 02/26/25 1500          Gait/Stairs (Locomotion)    Patient was able to Ambulate no, other medical factors prevent ambulation  -     Reason Patient was unable to Ambulate Other (Comment);Cognitive Deficit/Severe Dementia  unsafe  -       Row Name 02/26/25 1500          Motor Skills    Therapeutic Exercise hip;ankle  attempted hip add, ankle DF/PF with multiple repetitions  -       Row Name             Wound 02/23/25 0959 Left anterior thigh    Wound - Properties Group Placement Date: 02/23/25  -JG Placement Time: 0959 -JG Side: Left  -JG Orientation: anterior  -JG Location: thigh  -JG    Retired Wound - Properties Group Placement Date: 02/23/25  -JG Placement Time: 0959  -JG Side: Left  -JG Orientation: anterior  -JG Location: thigh  -JG    Retired Wound - Properties Group Placement Date: 02/23/25  -JG Placement Time: 0959  -JG Side: Left  -JG Orientation: anterior  -JG Location: thigh  -JG    Retired Wound - Properties Group Date first assessed: 02/23/25  -JG Time first assessed: 0959 -JG Side: Left  -JG Location: thigh  -JG      Row Name 02/26/25 1500          Positioning and Restraints    Pre-Treatment Position in bed  -     Post Treatment Position bed  -     In Bed supine;with family/caregiver;exit alarm on  -               User Key  (r) = Recorded By, (t) = Taken By, (c) = Cosigned By      Initials Name Provider Type    Kristin Avilez, RN Registered Nurse    Barbara Galarza, PT Physical Therapist                     Physical Therapy Education        No education to display                  PT Recommendation and Plan     Progress Summary (PT)  Daily Progress Summary (PT): Pt seen for treatment this date, participation was good, confusion limits pt participation; grossly max/depA at this time, no change to POC. Prognosis guarded to fair.       Time Calculation:    PT Charges       Row Name 25 1515             Time Calculation    Start Time 1325  -      PT Received On 25  -         Time Calculation- PT    Total Timed Code Minutes- PT 23 minute(s)  -                User Key  (r) = Recorded By, (t) = Taken By, (c) = Cosigned By      Initials Name Provider Type    Barbara Galarza, PT Physical Therapist                  Therapy Charges for Today       Code Description Service Date Service Provider Modifiers Qty    64923382491 HC PT THER PROC EA 15 MIN 2025 Barbara De La Cruz, PT GP 1    12883651376 HC PT THERAPEUTIC ACT EA 15 MIN 2025 Barbara De La Cruz, PT GP 1    64611782970 HC PT THER PROC EA 15 MIN 2025 Barbara De La Cruz, PT GP 1    40541113575 HC PT THERAPEUTIC ACT EA 15 MIN 2025 Barbara De La Cruz, PT GP 1            PT G-Codes  AM-PAC 6 Clicks Score (PT): 6    Barbara De La Cruz PT  2025      Electronically signed by Barbara De La Cruz PT at 25 1525          Occupational Therapy Notes (most recent note)        Radha Pablo, OT at 25 1050          Acute Care - Occupational Therapy Treatment Note  ESTELLA Brock     Patient Name: Crista Blunt  : 1940  MRN: [id number]  Today's Date: 2025  Onset of Illness/Injury or Date of Surgery: 25     Referring Physician: Jordyn    Admit Date: 2025       ICD-10-CM ICD-9-CM   1. Closed fracture of neck of left femur, initial encounter  S72.002A 820.8   2. Closed fracture of left hip, initial encounter  S72.002A 820.8     Patient Active Problem List   Diagnosis    Hip fracture     History reviewed. No pertinent past  medical history.  History reviewed. No pertinent surgical history.      OT ASSESSMENT FLOWSHEET (Last 12 Hours)       OT Evaluation and Treatment       Row Name 02/27/25 1044                   OT Time and Intention    Subjective Information no complaints  -LA        Document Type therapy note (daily note)  -LA        Mode of Treatment occupational therapy  -LA        Patient Effort fair  -LA           General Information    Patient Profile Reviewed yes  -LA        General Observations of Patient Patient sleeping in bed upon arrival. Patient spouse present. Spouse reported that patient slept well prevoius night and try to get patient awake for therapy. OT completed PROM of BUE however patient remainded lethargic. BUE ROM WFL. Patient unable to follow any command and dependent for bed mobility. Patient spouse reports that patient was dependent with ADLs at baseline.  -LA           Cognition    Affect/Mental Status (Cognition) low arousal/lethargic  -LA        Orientation Status (Cognition) unable/difficult to assess  -LA        Follows Commands (Cognition) does not follow one-step commands  -LA           Motor Skills    Therapeutic Exercise shoulder;elbow/forearm;wrist;hand  -LA           Wound 02/23/25 0959 Left anterior thigh    Wound - Properties Group Placement Date: 02/23/25  -JG Placement Time: 0959 -JG Side: Left  -JG Orientation: anterior  -JG Location: thigh  -JG    Retired Wound - Properties Group Placement Date: 02/23/25  -JG Placement Time: 0959  -JG Side: Left  -JG Orientation: anterior  -JG Location: thigh  -JG    Retired Wound - Properties Group Placement Date: 02/23/25  -JG Placement Time: 0959  -JG Side: Left  -JG Orientation: anterior  -JG Location: thigh  -JG    Retired Wound - Properties Group Date first assessed: 02/23/25  -JG Time first assessed: 0959  -JG Side: Left  -JG Location: thigh  -JG       Plan of Care Review    Plan of Care Reviewed With patient  -LA           Positioning and  Restraints    Pre-Treatment Position in bed  -LA        Post Treatment Position bed  -LA        In Bed supine;call light within reach;encouraged to call for assist;exit alarm on;with family/caregiver;with other staff  -LA                  User Key  (r) = Recorded By, (t) = Taken By, (c) = Cosigned By      Initials Name Effective Dates    Kristin Avilez RN 23 -     Radha Sue OT 22 -                      Occupational Therapy Education        No education to display                      OT Recommendation and Plan     Plan of Care Review  Plan of Care Reviewed With: patient  Plan of Care Reviewed With: patient        Time Calculation:     Therapy Charges for Today       Code Description Service Date Service Provider Modifiers Qty    42883198704 HC OT THER PROC EA 15 MIN 2025 Radha Pablo OT GO 1    19408994342 HC OT THERAPEUTIC ACT EA 15 MIN 2025 Radha Pablo OT GO 1                 Radha Pablo OT  2025    Electronically signed by Radha Pablo OT at 25 1050       Speech Language Pathology Notes (most recent note)    No notes exist for this encounter.     Jenniffer Palm, PT   Physical Therapist  Specialty: Physical Therapy     Therapy Evaluation      Signed     Date of Service: 25 143  Creation Time: 25     Signed       Expand All [facility] All    Acute Care - Physical Therapy Initial Evaluation   Vinod     Patient Name: Crista Blunt              : 1940                      MRN: [id number]  Today's Date: 2025                               Onset of Illness/Injury or Date of Surgery: 25  Visit Dx:   Visit Diagnosis       ICD-10-CM ICD-9-CM   1. Closed fracture of neck of left femur, initial encounter  S72.002A 820.8   2. Closed fracture of left hip, initial encounter  S72.002A 820.8         Problem List       Patient Active Problem List   Diagnosis    Hip fracture         Medical History   History reviewed. No pertinent past  medical history.     Surgical History   History reviewed. No pertinent surgical history.     PT Assessment (Last 12 Hours)    []Expand by Default        PT Evaluation and Treatment         Row Name 02/24/25 8843                 Physical Therapy Time and Intention     Document Type evaluation  -       Mode of Treatment physical therapy  -       Patient Effort adequate  -          Row Name 02/24/25 2669                 General Information     Patient Profile Reviewed yes  -       Onset of Illness/Injury or Date of Surgery 02/22/25  -       Referring Physician Jordyn  -       Patient Observations alert;cooperative;agree to therapy  -       Patient/Family/Caregiver Comments/Observations Family present in room  -       Prior Level of Function min assist:;all household mobility;gait;transfer;bed mobility  -       Existing Precautions/Restrictions fall;hip, anterior;other (see comments)  no active hip abduction, wedge was being worn in bed upon therapists arrival  -       Limitations/Impairments safety/cognitive  -

## 2025-02-28 PROCEDURE — 25010000002 ENOXAPARIN PER 10 MG: Performed by: GENERAL PRACTICE

## 2025-02-28 PROCEDURE — 99232 SBSQ HOSP IP/OBS MODERATE 35: CPT | Performed by: FAMILY MEDICINE

## 2025-02-28 PROCEDURE — 97530 THERAPEUTIC ACTIVITIES: CPT

## 2025-02-28 PROCEDURE — 97110 THERAPEUTIC EXERCISES: CPT

## 2025-02-28 RX ADMIN — Medication 10 ML: at 09:55

## 2025-02-28 RX ADMIN — CEFDINIR 300 MG: 300 CAPSULE ORAL at 09:48

## 2025-02-28 RX ADMIN — HYDROCODONE BITARTRATE AND ACETAMINOPHEN 1 TABLET: 5; 325 TABLET ORAL at 13:43

## 2025-02-28 RX ADMIN — AZITHROMYCIN DIHYDRATE 250 MG: 250 TABLET ORAL at 09:47

## 2025-02-28 RX ADMIN — CEFDINIR 300 MG: 300 CAPSULE ORAL at 20:42

## 2025-02-28 RX ADMIN — Medication 10 ML: at 20:43

## 2025-02-28 RX ADMIN — ENOXAPARIN SODIUM 40 MG: 100 INJECTION SUBCUTANEOUS at 09:48

## 2025-02-28 NOTE — PLAN OF CARE
Goal Outcome Evaluation:           Progress: no change  Outcome Evaluation: Patient resting in bed at this time. Alert to self only. Spouse at bedside. PRN pain medication given for pain. VSS on RA. No acute changes noted at this time. Will continue with POC.

## 2025-02-28 NOTE — THERAPY TREATMENT NOTE
Acute Care - Physical Therapy Treatment Note   Vinod     Patient Name: Crista Blunt  : 1940  MRN: [id number]  Today's Date: 2025   Onset of Illness/Injury or Date of Surgery: 25  Visit Dx:     ICD-10-CM ICD-9-CM   1. Closed fracture of neck of left femur, initial encounter  S72.002A 820.8   2. Closed fracture of left hip, initial encounter  S72.002A 820.8     Patient Active Problem List   Diagnosis    Hip fracture     History reviewed. No pertinent past medical history.  Past Surgical History:   Procedure Laterality Date    HIP BIPOLAR REPLACEMENT Left 2025    Procedure: Left hip lexi arthroplasty;  Surgeon: Perry Cobb MD;  Location: [facility];  Service: Orthopedics;  Laterality: Left;     PT Assessment (Last 12 Hours)       PT Evaluation and Treatment       Row Name 25 1006          Physical Therapy Time and Intention    Document Type therapy note (daily note)  -     Mode of Treatment physical therapy  -     Patient Effort poor  -     Comment Pt limited with effort this date  cognition. Pt grossly assisted depA to sit EOB and to stand x2 with posterior lean while sitting EOB, LE's too far anterior, assisted to sit back down. Poor participation as pt is confused and disengaged.  -       Row Name 25 1006          Bed Mobility    Bed Mobility supine-sit;sit-supine  -     Supine-Sit [place] (Bed Mobility) dependent (less than 25% patient effort);2 person assist  -     Sit-Supine [place] (Bed Mobility) dependent (less than 25% patient effort);2 person assist  -       Row Name 25 1006          Transfers    Transfers sit-stand transfer;stand-sit transfer  -       Row Name 25 1006          Sit-Stand Transfer    Sit-Stand [place] (Transfers) 2 person assist;dependent (less than 25% patient effort)  -     Assistive Device (Sit-Stand Transfers) walker, front-wheeled  -       Row Name 25 1006          Stand-Sit Transfer     Stand-Sit [place] (Transfers) 2 person assist;dependent (less than 25% patient effort)  -     Assistive Device (Stand-Sit Transfers) walker, front-wheeled  -       Row Name 02/28/25 1006          Gait/Stairs (Locomotion)    Patient was able to Ambulate no, other medical factors prevent ambulation  -     Comment, (Gait/Stairs) encouraged to take steps however pt was unable  -       Row Name             Wound 02/23/25 0959 Left anterior thigh    Wound - Properties Group Placement Date: 02/23/25  -JG Placement Time: 0959 -JG Side: Left  -JG Orientation: anterior  -JG Location: thigh  -JG    Retired Wound - Properties Group Placement Date: 02/23/25  -JG Placement Time: 0959  -JG Side: Left  -JG Orientation: anterior  -JG Location: thigh  -JG    Retired Wound - Properties Group Placement Date: 02/23/25  -JG Placement Time: 0959  -JG Side: Left  -JG Orientation: anterior  -JG Location: thigh  -JG    Retired Wound - Properties Group Date first assessed: 02/23/25  -JG Time first assessed: 0959  -JG Side: Left  -JG Location: thigh  -JG      Row Name 02/28/25 1006          Plan of Care Review    Progress no change  -       Row Name 02/28/25 1006          Positioning and Restraints    Pre-Treatment Position in bed  -     Post Treatment Position bed  -KH     In Bed supine;exit alarm on;with family/caregiver  -       Row Name 02/28/25 1006          Progress Summary (PT)    Daily Progress Summary (PT) Prognosis guarded/poor, pt more disengaged this date, pt may benefit from PT if cognition/confusion improves. No change to POC.  -               User Key  (r) = Recorded By, (t) = Taken By, (c) = Cosigned By      Initials Name Provider Type    Kristin Avilez, RN Registered Nurse    Barbara Galarza, PT Physical Therapist                    Physical Therapy Education        No education to display                  PT Recommendation and Plan     Progress Summary (PT)  Daily Progress Summary (PT):  Prognosis guarded/poor, pt more disengaged this date, pt may benefit from PT if cognition/confusion improves. No change to POC.  Progress: no change       Time Calculation:    PT Charges       Row Name 02/28/25 1029             Time Calculation    Start Time 1006  -KH      PT Received On 02/28/25  -KH                User Key  (r) = Recorded By, (t) = Taken By, (c) = Cosigned By      Initials Name Provider Type     Barbara De La Cruz, PT Physical Therapist                  Therapy Charges for Today       Code Description Service Date Service Provider Modifiers Qty    24191568531 HC PT THERAPEUTIC ACT EA 15 MIN 2/27/2025 Barbara De La Cruz, PT GP 1            PT G-Codes  AM-PAC 6 Clicks Score (PT): 12    Barbara De La Cruz PT  2/28/2025

## 2025-02-28 NOTE — CASE MANAGEMENT/SOCIAL WORK
Discharge Planning Assessment  [facility]     Patient Name: Crista Blunt  MRN: [id number]  Today's Date: 2/28/2025    Admit Date: 2/22/2025            Discharge Plan       Row Name 02/28/25 1027       Plan    Plan SS confirmed with [facility] per Aura that Pt's bed remains available if pre-auth is approved. SS notified  pre-auth team, and pre-auth is pending at this time. SS to follow.    16:00pm: SS updated Pt's spouse at bedside and made him aware that [facility] has accepting pending pre-auth. Pre-auth remains pending. SS to follow up on 03/03/25.                  Continued Care and Services - Admitted Since 2/22/2025       Destination Coordination complete.      Service Provider Request Status Services Address Phone Fax Patient Preferred    [facility]  Selected Skilled Nursing [address] [facility] [address] [phone] --    [facility] Declined  Bed not available -- [address] [address] [phone] --                    YULIYA Pickering

## 2025-02-28 NOTE — PLAN OF CARE
Goal Outcome Evaluation:  Plan of Care Reviewed With: patient        Progress: no change  Outcome Evaluation: Pt resting in bed throughout shift. VSS on RA. Alert to self only. Spouse at bedside. No acute changes. No concerns or requests at this time. Will continue plan of care.

## 2025-02-28 NOTE — THERAPY TREATMENT NOTE
Acute Care - Occupational Therapy Treatment Note   [facility]     Patient Name: Crista Blunt  : 1940  MRN: [id number]  Today's Date: 2025  Onset of Illness/Injury or Date of Surgery: 25     Referring Physician: Jordyn    Admit Date: 2025       ICD-10-CM ICD-9-CM   1. Closed fracture of neck of left femur, initial encounter  S72.002A 820.8   2. Closed fracture of left hip, initial encounter  S72.002A 820.8     Patient Active Problem List   Diagnosis    Hip fracture     History reviewed. No pertinent past medical history.  Past Surgical History:   Procedure Laterality Date    HIP BIPOLAR REPLACEMENT Left 2025    Procedure: Left hip lexi arthroplasty;  Surgeon: Perry Cobb MD;  Location: [facility];  Service: Orthopedics;  Laterality: Left;         OT ASSESSMENT FLOWSHEET (Last 12 Hours)       OT Evaluation and Treatment       Row Name 25 1042                   OT Time and Intention    Subjective Information no complaints  -LA        Document Type therapy note (daily note)  -LA        Mode of Treatment occupational therapy  -LA        Patient Effort poor  -LA           General Information    Patient Profile Reviewed yes  -LA        General Observations of Patient Patient agreeable to therapy this date. Patient alert but not able to follow commands. Patient spouse present throughout treatment. Dependent with bed mobility and sit to stands this date. Patient with noted posterior lean with sitting and standing.  -LA        Existing Precautions/Restrictions fall;hip, anterior;left  -LA           Cognition    Affect/Mental Status (Cognition) confused  -LA        Orientation Status (Cognition) disoriented to;person;place;situation  -LA        Follows Commands (Cognition) does not follow one-step commands  -LA           Bed Mobility    Supine-Sit [place] (Bed Mobility) dependent (less than 25% patient effort)  -LA        Sit-Supine [place] (Bed Mobility) dependent (less than 25%  patient effort)  -LA           Sit-Stand Transfer    Sit-Stand [place] (Transfers) dependent (less than 25% patient effort);2 person assist  -LA           Stand-Sit Transfer    Stand-Sit [place] (Transfers) dependent (less than 25% patient effort);2 person assist  -LA           Motor Skills    Motor Skills coordination;functional endurance  -LA        Functional Endurance poor  -LA           Balance    Static Sitting Balance maximum assist;dependent  -LA           Wound 02/23/25 0959 Left anterior thigh    Wound - Properties Group Placement Date: 02/23/25  -JG Placement Time: 0959 -JG Side: Left  -JG Orientation: anterior  -JG Location: thigh  -JG    Retired Wound - Properties Group Placement Date: 02/23/25  -JG Placement Time: 0959 -JG Side: Left  -JG Orientation: anterior  -JG Location: thigh  -JG    Retired Wound - Properties Group Placement Date: 02/23/25  -JG Placement Time: 0959 -JG Side: Left  -JG Orientation: anterior  -JG Location: thigh  -JG    Retired Wound - Properties Group Date first assessed: 02/23/25  -JG Time first assessed: 0959 -JG Side: Left  -JG Location: thigh  -JG       Plan of Care Review    Plan of Care Reviewed With patient;spouse  -LA        Progress no change  -LA           Positioning and Restraints    Pre-Treatment Position in bed  -LA        Post Treatment Position bed  -LA        In Bed supine;call light within reach;encouraged to call for assist;exit alarm on  -LA                  User Key  (r) = Recorded By, (t) = Taken By, (c) = Cosigned By      Initials Name Effective Dates    Kristin Avilez RN 08/29/23 -     Radha Sue OT 02/14/22 -                      Occupational Therapy Education        No education to display                      OT Recommendation and Plan     Plan of Care Review  Plan of Care Reviewed With: patient, spouse  Progress: no change  Plan of Care Reviewed With: patient, spouse        Time Calculation:     Therapy Charges for Today        Code Description Service Date Service Provider Modifiers Qty    08929629691 HC OT THER PROC EA 15 MIN 2/27/2025 Radha Pablo OT GO 1    83710505477 HC OT THERAPEUTIC ACT EA 15 MIN 2/27/2025 Radha Pablo OT GO 1    96051253835 HC OT THER PROC EA 15 MIN 2/28/2025 Radha Pablo OT GO 1    74774063489 HC OT THERAPEUTIC ACT EA 15 MIN 2/28/2025 Radha Pablo OT GO 1                 Radha Pablo OT  2/28/2025

## 2025-02-28 NOTE — PROGRESS NOTES
[facility]  PROGRESS NOTE     Patient Identification:  Name:  Crista Blunt  Age:  84 y.o.  Sex:  female  :  1940  MRN:  [id number]  Visit Number:  [id number]  ROOM: [address] [place] [address]     Primary Care Provider:  Abby Mckenzie DO    Length of stay in inpatient status:  5    Subjective     Chief Compliant: Status post left hip fracture with repair  Chief Complaint   Patient presents with    Fall       History of Presenting Illness: 84-year-old female with advanced dementia who is status post hip fracture with recent repair.  Patient has been treated for UTI as well during the admission.  Patient is confused from her dementia has does not seem to have any new complaints.   is at bedside and states that she is not able to do much therapy at this time secondary to pain issues with weightbearing.    Objective     Current Hospital Meds:cefdinir, 300 mg, Oral, Q12H  enoxaparin sodium, 40 mg, Subcutaneous, Q24H  ropivacaine 0.5 % 250 mg, Morphine 10 mg, cloNIDine 80 mcg, ketorolac 30 mg, EPINEPHrine 1 mg/mL 0.5 mg in sodium chloride 0.9 % 50 mL solution, , Intra-articular, Once  sodium chloride, 10 mL, Intravenous, Q12H       ----------------------------------------------------------------------------------------------------------------------  Vital Signs:  Temp:  [97.6 °F (36.4 °C)-98.6 °F (37 °C)] 98.5 °F (36.9 °C)  Heart Rate:  [] 100  Resp:  [16-20] 20  BP: (111-143)/(56-73) 117/62  SpO2:  [95 %-97 %] 96 %  on   ;   Device (Oxygen Therapy): room air  Body mass index is 22.82 kg/m².    Wt Readings from Last 3 Encounters:   25 62.2 kg (137 lb 2 oz)   24 59 kg (130 lb)     Intake & Output (last 3 days)          07 07    P.O. 480 1120 680 960    I.V. (mL/kg) 0 (0) 0 (0)      Total Intake(mL/kg) 480 (7.7) 1120 (18) 680 (10.9) 960 (15.4)    Urine (mL/kg/hr) 1200 (0.8) 1250 (0.8) 1000  (0.7)     Stool  0 0     Total Output 1200 1250 1000     Net -720 -130 -320 +960            Urine Unmeasured Occurrence  1 x 3 x     Stool Unmeasured Occurrence  0 x 0 x           Diet: Regular/House; Fluid Consistency: Thin (IDDSI 0)  ----------------------------------------------------------------------------------------------------------------------  Physical exam:  Constitutional: No acute distress  HEENT: Normocephalic atraumatic  Neck: Supple  Cardiovascular: Regular rate and rhythm  Pulmonary/Chest: Clear to auscultation  Abdominal: Positive bowel sounds soft.   Musculoskeletal: Status post hip repair  Neurological: Advanced dementia  Skin: No rash  Peripheral vascular: No edema  Genitourinary:  ----------------------------------------------------------------------------------------------------------------------    Last echocardiogram:    ----------------------------------------------------------------------------------------------------------------------  Results from last 7 days   Lab Units 02/27/25  1109 02/25/25  0239 02/23/25  1536 02/23/25  0508   WBC 10*3/mm3 9.84 12.69* 16.87* 14.15*   HEMOGLOBIN g/dL 10.8* 10.7* 11.4* 13.8   HEMATOCRIT % 35.1 33.4* 34.6 42.5   MCV fL 99.7* 97.9* 96.9 96.4   MCHC g/dL 30.8* 32.0 32.9 32.5   PLATELETS 10*3/mm3 284 203 190 247   INR   --   --   --  0.97     Results from last 7 days   Lab Units 02/23/25  1602   PH, ARTERIAL pH units 7.397   PO2 ART mm Hg 55.8*   PCO2, ARTERIAL mm Hg 40.0   HCO3 ART mmol/L 24.6     Results from last 7 days   Lab Units 02/27/25  1109 02/25/25  0752 02/25/25  0239 02/23/25  1536 02/23/25  0508 02/23/25  0155   SODIUM mmol/L 138  --  136 137 137 136   POTASSIUM mmol/L 3.8  --  3.7 4.3 3.9 3.8   MAGNESIUM mg/dL  --   --   --   --   --  2.3   CHLORIDE mmol/L 105  --  105 103 102 101   CO2 mmol/L 23.7  --  23.2 22.9 25.5 23.9   BUN mg/dL 12  --  12 12 12 14   CREATININE mg/dL 0.66  --  0.78 0.81 0.77 0.81   CALCIUM mg/dL 8.3*  --  8.0* 7.9* 8.7  "9.1   IONIZED CALCIUM mmol/L  --  1.08*  --   --   --   --    GLUCOSE mg/dL 105*  --  133* 118* 147* 171*   ALBUMIN g/dL  --   --   --  3.3* 4.1 4.1   BILIRUBIN mg/dL  --   --   --  0.4 0.4 0.4   ALK PHOS U/L  --   --   --  70 93 100   AST (SGOT) U/L  --   --   --  18 17 16   ALT (SGPT) U/L  --   --   --  11 12 12   Estimated Creatinine Clearance: 63.4 mL/min (by C-G formula based on SCr of 0.66 mg/dL).  No results found for: \"AMMONIA\"              No results found for: \"HGBA1C\", \"POCGLU\"  Lab Results   Component Value Date    TSH 1.800 02/23/2025     No results found for: \"PREGTESTUR\", \"PREGSERUM\", \"HCG\", \"HCGQUANT\"  Pain Management Panel           No data to display              Brief Urine Lab Results  (Last result in the past 365 days)        Color   Clarity   Blood   Leuk Est   Nitrite   Protein   CREAT   Urine HCG        02/23/25 0537 Yellow   Clear   Negative   Small (1+)   Positive   Negative                 Blood Culture   Date Value Ref Range Status   02/24/2025 No growth at 4 days  Preliminary   02/24/2025 No growth at 4 days  Preliminary     Results from last 7 days   Lab Units 02/23/25  0537   NITRITE UA  Positive*   WBC UA /HPF 6-10*   BACTERIA UA /HPF 4+*   SQUAM EPITHEL UA /HPF 0-2   URINECX  >100,000 CFU/mL Escherichia coli*     Urine Culture   Date Value Ref Range Status   02/23/2025 >100,000 CFU/mL Escherichia coli (A)  Final     No results found for: \"WOUNDCX\"  No results found for: \"STOOLCX\"  Results from last 7 days   Lab Units 02/24/25  1004   PROCALCITONIN ng/mL 1.31*       I have personally looked at the labs and they are summarized above.  ----------------------------------------------------------------------------------------------------------------------  Detailed radiology reports for the last 24 hours:    Imaging Results (Last 24 Hours)       ** No results found for the last 24 hours. **          Final impressions for the last 30 days of radiology reports:    XR Pelvis 1 or 2 " View    Result Date: 2/24/2025    Left total hip arthroplasty. Components appear well seated.  This report was finalized on 2/24/2025 8:30 AM by Dr. Bradley Rausch MD.      XR Chest 1 View    Result Date: 2/23/2025   Mild enlarged heart size. Coarsened bronchovascular pattern to the lungs Possible left lower lobe pneumonia. Azygos lobe variant in the right upper lobe No fracture or dislocation.  This report was finalized on 2/23/2025 7:39 PM by Eamon Omalley MD.      XR Chest 1 View    Result Date: 2/23/2025   Normal heart size Hypoinflated lungs Coarsened bronchovascular pattern to the lungs. Left lower lobe atelectasis versus pneumonia. No pleural effusion or pneumothorax No fracture or foreign body.  This report was finalized on 2/23/2025 5:27 AM by Eamon Omalley MD.      XR Tibia Fibula 2 View Left    Result Date: 2/23/2025   Intact left tibia and fibula with no acute fracture or dislocation. 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 Mild osteoarthritis at the left tibiotalar joint. No foreign body.    This report was finalized on 2/23/2025 12:20 AM by Eamon Omalley MD.      XR Femur 2 View Left    Result Date: 2/23/2025   Acute left femoral neck fracture. No acute dislocation. Osteoarthritis.  This report was finalized on 2/23/2025 12:19 AM by Eamon Omalley MD.      XR Hip With or Without Pelvis 2 - 3 View Left    Result Date: 2/23/2025  Impression:  Acute left femoral neck fracture. No acute dislocation of the left femoral head. Mild osteoarthritis at the right and left hip joint. Intact pelvis No acute foreign body.  This report was finalized on 2/23/2025 12:18 AM by Eamon Omalley MD.      CT Cervical Spine Without Contrast    Result Date: 2/23/2025   No acute fracture or dislocation. No apical pneumothorax.  This report was finalized on 2/23/2025 12:16 AM by Eamon Omalley MD.      CT Head Without Contrast    Result Date: 2/23/2025  Impression:  Moderate generalized brain volume loss with mild to moderate  chronic small vessel ischemic type changes in the white matter. Ex-vacuo dilatation of the ventricular system due to underlying deep white matter parenchymal volume loss No acute intracranial hemorrhage, mass, infarct, or edema. Minimal mucosal thickening in the ethmoid air cells Partial opacification of the left mastoid air cells. No fracture or dislocation. No scalp hematoma.  This report was finalized on 2/23/2025 12:15 AM by Eamon Omalley MD.     I have personally looked at the radiology images and read the final radiology report.    Assessment & Plan    Status post left hip fracture requiring ORIF--being evaluated for SNF for subacute rehab at this time.    UTI secondary to E. coli continue Omnicef.    Alzheimer's type dementia supportive care    Questionable left lower lobe pneumonia patient on examination is clear.  Is completing Zithromax course in addition to Omnicef    Hypertension controlled    GERD continue PPI    VTE Prophylaxis:   Lovenox        Deander Hernandez MD  [facility]  02/28/25  15:25 EST

## 2025-03-01 LAB
BACTERIA SPEC AEROBE CULT: NORMAL
BACTERIA SPEC AEROBE CULT: NORMAL

## 2025-03-01 PROCEDURE — 99232 SBSQ HOSP IP/OBS MODERATE 35: CPT | Performed by: INTERNAL MEDICINE

## 2025-03-01 PROCEDURE — 25010000002 ENOXAPARIN PER 10 MG: Performed by: GENERAL PRACTICE

## 2025-03-01 RX ADMIN — Medication 10 ML: at 21:14

## 2025-03-01 RX ADMIN — CEFDINIR 300 MG: 300 CAPSULE ORAL at 08:34

## 2025-03-01 RX ADMIN — CEFDINIR 300 MG: 300 CAPSULE ORAL at 21:10

## 2025-03-01 RX ADMIN — HYDROCODONE BITARTRATE AND ACETAMINOPHEN 1 TABLET: 5; 325 TABLET ORAL at 15:41

## 2025-03-01 RX ADMIN — ENOXAPARIN SODIUM 40 MG: 100 INJECTION SUBCUTANEOUS at 08:34

## 2025-03-01 RX ADMIN — Medication 10 ML: at 08:35

## 2025-03-01 NOTE — PLAN OF CARE
Goal Outcome Evaluation:           Progress: no change  Outcome Evaluation: Patient has rested well today.  Family at bedside.  PRN medications given per order.  VSS.  Continue plan of care

## 2025-03-01 NOTE — PLAN OF CARE
Goal Outcome Evaluation:  Plan of Care Reviewed With: patient        Progress: no change  Outcome Evaluation: Pt resting in bed during assessment, VSS. Pt has Alert to self only. Family at bedside. Pt shows no S/S of distress at this time. Will cont POC.

## 2025-03-02 LAB
ANION GAP SERPL CALCULATED.3IONS-SCNC: 7.4 MMOL/L (ref 5–15)
ATMOSPHERIC PRESS: 730 MMHG
BASE EXCESS BLDV CALC-SCNC: 3.7 MMOL/L (ref 0–2)
BASOPHILS # BLD AUTO: 0.09 10*3/MM3 (ref 0–0.2)
BASOPHILS NFR BLD AUTO: 0.9 % (ref 0–1.5)
BDY SITE: ABNORMAL
BUN SERPL-MCNC: 20 MG/DL (ref 8–23)
BUN/CREAT SERPL: 31.3 (ref 7–25)
CALCIUM SPEC-SCNC: 8.5 MG/DL (ref 8.6–10.5)
CHLORIDE SERPL-SCNC: 105 MMOL/L (ref 98–107)
CO2 BLDA-SCNC: 29.9 MMOL/L (ref 22–33)
CO2 SERPL-SCNC: 26.6 MMOL/L (ref 22–29)
COHGB MFR BLD: 1.7 % (ref 0–5)
CREAT SERPL-MCNC: 0.64 MG/DL (ref 0.57–1)
DEPRECATED RDW RBC AUTO: 45.7 FL (ref 37–54)
EGFRCR SERPLBLD CKD-EPI 2021: 87.8 ML/MIN/1.73
EOSINOPHIL # BLD AUTO: 0.5 10*3/MM3 (ref 0–0.4)
EOSINOPHIL NFR BLD AUTO: 4.9 % (ref 0.3–6.2)
ERYTHROCYTE [DISTWIDTH] IN BLOOD BY AUTOMATED COUNT: 12.8 % (ref 12.3–15.4)
GLUCOSE SERPL-MCNC: 108 MG/DL (ref 65–99)
HCO3 BLDV-SCNC: 28.5 MMOL/L (ref 22–28)
HCT VFR BLD AUTO: 33.2 % (ref 34–46.6)
HGB BLD-MCNC: 10.5 G/DL (ref 12–15.9)
HGB BLDA-MCNC: 10.7 G/DL (ref 13.5–17.5)
IMM GRANULOCYTES # BLD AUTO: 0.12 10*3/MM3 (ref 0–0.05)
IMM GRANULOCYTES NFR BLD AUTO: 1.2 % (ref 0–0.5)
INHALED O2 CONCENTRATION: 21 %
LYMPHOCYTES # BLD AUTO: 2.48 10*3/MM3 (ref 0.7–3.1)
LYMPHOCYTES NFR BLD AUTO: 24.4 % (ref 19.6–45.3)
Lab: ABNORMAL
MCH RBC QN AUTO: 31 PG (ref 26.6–33)
MCHC RBC AUTO-ENTMCNC: 31.6 G/DL (ref 31.5–35.7)
MCV RBC AUTO: 97.9 FL (ref 79–97)
METHGB BLD QL: 0.5 % (ref 0–3)
MODALITY: ABNORMAL
MONOCYTES # BLD AUTO: 0.83 10*3/MM3 (ref 0.1–0.9)
MONOCYTES NFR BLD AUTO: 8.2 % (ref 5–12)
NEUTROPHILS NFR BLD AUTO: 6.13 10*3/MM3 (ref 1.7–7)
NEUTROPHILS NFR BLD AUTO: 60.4 % (ref 42.7–76)
NRBC BLD AUTO-RTO: 0 /100 WBC (ref 0–0.2)
OXYHGB MFR BLDV: 71.4 % (ref 45–75)
PCO2 BLDV: 43.4 MM HG (ref 41–51)
PH BLDV: 7.43 PH UNITS (ref 7.32–7.42)
PLATELET # BLD AUTO: 378 10*3/MM3 (ref 140–450)
PMV BLD AUTO: 8.9 FL (ref 6–12)
PO2 BLDV: 36.2 MM HG (ref 27–53)
POTASSIUM SERPL-SCNC: 4.1 MMOL/L (ref 3.5–5.2)
RBC # BLD AUTO: 3.39 10*6/MM3 (ref 3.77–5.28)
SAO2 % BLDCOV: 73 % (ref 45–75)
SODIUM SERPL-SCNC: 139 MMOL/L (ref 136–145)
VENTILATOR MODE: ABNORMAL
WBC NRBC COR # BLD AUTO: 10.15 10*3/MM3 (ref 3.4–10.8)

## 2025-03-02 PROCEDURE — 25010000002 ENOXAPARIN PER 10 MG: Performed by: GENERAL PRACTICE

## 2025-03-02 PROCEDURE — 80048 BASIC METABOLIC PNL TOTAL CA: CPT | Performed by: INTERNAL MEDICINE

## 2025-03-02 PROCEDURE — 85025 COMPLETE CBC W/AUTO DIFF WBC: CPT | Performed by: INTERNAL MEDICINE

## 2025-03-02 PROCEDURE — 82820 HEMOGLOBIN-OXYGEN AFFINITY: CPT

## 2025-03-02 PROCEDURE — 99232 SBSQ HOSP IP/OBS MODERATE 35: CPT | Performed by: INTERNAL MEDICINE

## 2025-03-02 PROCEDURE — 82805 BLOOD GASES W/O2 SATURATION: CPT

## 2025-03-02 RX ORDER — LACTULOSE 10 G/15ML
30 SOLUTION ORAL EVERY 12 HOURS PRN
Status: DISCONTINUED | OUTPATIENT
Start: 2025-03-02 | End: 2025-03-07 | Stop reason: HOSPADM

## 2025-03-02 RX ORDER — DOCUSATE SODIUM 100 MG/1
100 CAPSULE, LIQUID FILLED ORAL 2 TIMES DAILY
Status: DISCONTINUED | OUTPATIENT
Start: 2025-03-02 | End: 2025-03-07 | Stop reason: HOSPADM

## 2025-03-02 RX ADMIN — ENOXAPARIN SODIUM 40 MG: 100 INJECTION SUBCUTANEOUS at 08:23

## 2025-03-02 RX ADMIN — DOCUSATE SODIUM 100 MG: 100 CAPSULE, LIQUID FILLED ORAL at 22:54

## 2025-03-02 RX ADMIN — Medication 10 ML: at 20:14

## 2025-03-02 RX ADMIN — CEFDINIR 300 MG: 300 CAPSULE ORAL at 08:23

## 2025-03-02 RX ADMIN — CEFDINIR 300 MG: 300 CAPSULE ORAL at 20:14

## 2025-03-02 RX ADMIN — SENNOSIDES, DOCUSATE SODIUM 2 TABLET: 50; 8.6 TABLET, FILM COATED ORAL at 10:57

## 2025-03-02 RX ADMIN — HYDROCODONE BITARTRATE AND ACETAMINOPHEN 1 TABLET: 5; 325 TABLET ORAL at 06:22

## 2025-03-02 RX ADMIN — Medication 10 ML: at 08:23

## 2025-03-02 NOTE — PLAN OF CARE
Goal Outcome Evaluation:  Plan of Care Reviewed With: patient        Progress: no change  Outcome Evaluation: Pt resting in bed at this time. Pt unable to ambulate. Pt turned every 2 hours. Z-gaurd applied to bottom.  at bedside. No acute changes or concerns noted a this time. Plan of care ongoing.

## 2025-03-02 NOTE — PLAN OF CARE
Goal Outcome Evaluation:  Plan of Care Reviewed With: patient        Progress: no change  Outcome Evaluation: Patient resting in bed. VSS. Patient alert to self only. Patient voices no concerns at this time, will continue with POC.

## 2025-03-02 NOTE — PROGRESS NOTES
[facility]  PROGRESS NOTE     Patient Identification:  Name:  Crista Blunt  Age:  84 y.o.  Sex:  female  :  1940  MRN:  [id number]  Visit Number:  [id number]  ROOM: [address] [place] [address]     Primary Care Provider:  Abby Mckenzie DO    Length of stay in inpatient status:  6    Subjective     Chief Compliant: Status post left hip fracture with repair  Chief Complaint   Patient presents with    Fall       History of Presenting Illness: 84-year-old female with advanced dementia who is status post hip fracture with recent repair.  Patient has been treated for UTI as well during the admission.  Patient is confused from her dementia has does not seem to have any new complaints.   is at bedside and states that she is not able to do much therapy at this time secondary to pain issues with weightbearing.    Objective     Current Hospital Meds:cefdinir, 300 mg, Oral, Q12H  enoxaparin sodium, 40 mg, Subcutaneous, Q24H  ropivacaine 0.5 % 250 mg, Morphine 10 mg, cloNIDine 80 mcg, ketorolac 30 mg, EPINEPHrine 1 mg/mL 0.5 mg in sodium chloride 0.9 % 50 mL solution, , Intra-articular, Once  sodium chloride, 10 mL, Intravenous, Q12H       ----------------------------------------------------------------------------------------------------------------------  Vital Signs:  Temp:  [97.6 °F (36.4 °C)-99 °F (37.2 °C)] 98.9 °F (37.2 °C)  Heart Rate:  [] 100  Resp:  [16-20] 18  BP: (104-127)/(58-66) 111/63     on   ;   Device (Oxygen Therapy): room air  Body mass index is 22.82 kg/m².    Wt Readings from Last 3 Encounters:   25 62.2 kg (137 lb 2 oz)   24 59 kg (130 lb)     Intake & Output (last 3 days)          07 07    P.O. 480 1120 680 960    I.V. (mL/kg) 0 (0) 0 (0)      Total Intake(mL/kg) 480 (7.7) 1120 (18) 680 (10.9) 960 (15.4)    Urine (mL/kg/hr) 1200 (0.8) 1250 (0.8) 1000 (0.7)     Stool  0 0      Total Output 1200 1250 1000     Net -720 -130 -320 +960            Urine Unmeasured Occurrence  1 x 3 x     Stool Unmeasured Occurrence  0 x 0 x           Diet: Regular/House; Fluid Consistency: Thin (IDDSI 0)  ----------------------------------------------------------------------------------------------------------------------  Physical exam:  Constitutional: No acute distress  HEENT: Normocephalic atraumatic  Neck: Supple  Cardiovascular: Regular rate and rhythm  Pulmonary/Chest: Clear to auscultation  Abdominal: Positive bowel sounds soft.   Musculoskeletal: Status post hip repair  Neurological: Advanced dementia  Skin: No rash  Peripheral vascular: No edema  Genitourinary:  ----------------------------------------------------------------------------------------------------------------------    Last echocardiogram:    ----------------------------------------------------------------------------------------------------------------------  Results from last 7 days   Lab Units 02/27/25  1109 02/25/25  0239 02/23/25  1536 02/23/25  0508   WBC 10*3/mm3 9.84 12.69* 16.87* 14.15*   HEMOGLOBIN g/dL 10.8* 10.7* 11.4* 13.8   HEMATOCRIT % 35.1 33.4* 34.6 42.5   MCV fL 99.7* 97.9* 96.9 96.4   MCHC g/dL 30.8* 32.0 32.9 32.5   PLATELETS 10*3/mm3 284 203 190 247   INR   --   --   --  0.97     Results from last 7 days   Lab Units 02/23/25  1602   PH, ARTERIAL pH units 7.397   PO2 ART mm Hg 55.8*   PCO2, ARTERIAL mm Hg 40.0   HCO3 ART mmol/L 24.6     Results from last 7 days   Lab Units 02/27/25  1109 02/25/25  0752 02/25/25  0239 02/23/25  1536 02/23/25  0508 02/23/25  0155   SODIUM mmol/L 138  --  136 137 137 136   POTASSIUM mmol/L 3.8  --  3.7 4.3 3.9 3.8   MAGNESIUM mg/dL  --   --   --   --   --  2.3   CHLORIDE mmol/L 105  --  105 103 102 101   CO2 mmol/L 23.7  --  23.2 22.9 25.5 23.9   BUN mg/dL 12  --  12 12 12 14   CREATININE mg/dL 0.66  --  0.78 0.81 0.77 0.81   CALCIUM mg/dL 8.3*  --  8.0* 7.9* 8.7 9.1   IONIZED CALCIUM  "mmol/L  --  1.08*  --   --   --   --    GLUCOSE mg/dL 105*  --  133* 118* 147* 171*   ALBUMIN g/dL  --   --   --  3.3* 4.1 4.1   BILIRUBIN mg/dL  --   --   --  0.4 0.4 0.4   ALK PHOS U/L  --   --   --  70 93 100   AST (SGOT) U/L  --   --   --  18 17 16   ALT (SGPT) U/L  --   --   --  11 12 12   Estimated Creatinine Clearance: 63.4 mL/min (by C-G formula based on SCr of 0.66 mg/dL).  No results found for: \"AMMONIA\"              No results found for: \"HGBA1C\", \"POCGLU\"  Lab Results   Component Value Date    TSH 1.800 02/23/2025     No results found for: \"PREGTESTUR\", \"PREGSERUM\", \"HCG\", \"HCGQUANT\"  Pain Management Panel           No data to display              Brief Urine Lab Results  (Last result in the past 365 days)        Color   Clarity   Blood   Leuk Est   Nitrite   Protein   CREAT   Urine HCG        02/23/25 0537 Yellow   Clear   Negative   Small (1+)   Positive   Negative                 Blood Culture   Date Value Ref Range Status   02/24/2025 No growth at 4 days  Preliminary   02/24/2025 No growth at 4 days  Preliminary     Results from last 7 days   Lab Units 02/23/25  0537   NITRITE UA  Positive*   WBC UA /HPF 6-10*   BACTERIA UA /HPF 4+*   SQUAM EPITHEL UA /HPF 0-2   URINECX  >100,000 CFU/mL Escherichia coli*     Urine Culture   Date Value Ref Range Status   02/23/2025 >100,000 CFU/mL Escherichia coli (A)  Final     No results found for: \"WOUNDCX\"  No results found for: \"STOOLCX\"  Results from last 7 days   Lab Units 02/24/25  1004   PROCALCITONIN ng/mL 1.31*       I have personally looked at the labs and they are summarized above.  ----------------------------------------------------------------------------------------------------------------------  Detailed radiology reports for the last 24 hours:    Imaging Results (Last 24 Hours)       ** No results found for the last 24 hours. **          Final impressions for the last 30 days of radiology reports:    XR Pelvis 1 or 2 View    Result Date: 2/24/2025   "  Left total hip arthroplasty. Components appear well seated.  This report was finalized on 2/24/2025 8:30 AM by Dr. Bradley Rausch MD.      XR Chest 1 View    Result Date: 2/23/2025   Mild enlarged heart size. Coarsened bronchovascular pattern to the lungs Possible left lower lobe pneumonia. Azygos lobe variant in the right upper lobe No fracture or dislocation.  This report was finalized on 2/23/2025 7:39 PM by Eamon Omalley MD.      XR Chest 1 View    Result Date: 2/23/2025   Normal heart size Hypoinflated lungs Coarsened bronchovascular pattern to the lungs. Left lower lobe atelectasis versus pneumonia. No pleural effusion or pneumothorax No fracture or foreign body.  This report was finalized on 2/23/2025 5:27 AM by Eamon Omalley MD.      XR Tibia Fibula 2 View Left    Result Date: 2/23/2025   Intact left tibia and fibula with no acute fracture or dislocation. 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 Mild osteoarthritis at the left tibiotalar joint. No foreign body.    This report was finalized on 2/23/2025 12:20 AM by Eamon Omalley MD.      XR Femur 2 View Left    Result Date: 2/23/2025   Acute left femoral neck fracture. No acute dislocation. Osteoarthritis.  This report was finalized on 2/23/2025 12:19 AM by Eamon Omalley MD.      XR Hip With or Without Pelvis 2 - 3 View Left    Result Date: 2/23/2025  Impression:  Acute left femoral neck fracture. No acute dislocation of the left femoral head. Mild osteoarthritis at the right and left hip joint. Intact pelvis No acute foreign body.  This report was finalized on 2/23/2025 12:18 AM by Eamon Omalley MD.      CT Cervical Spine Without Contrast    Result Date: 2/23/2025   No acute fracture or dislocation. No apical pneumothorax.  This report was finalized on 2/23/2025 12:16 AM by Eamon Omalley MD.      CT Head Without Contrast    Result Date: 2/23/2025  Impression:  Moderate generalized brain volume loss with mild to moderate chronic small vessel ischemic type  changes in the white matter. Ex-vacuo dilatation of the ventricular system due to underlying deep white matter parenchymal volume loss No acute intracranial hemorrhage, mass, infarct, or edema. Minimal mucosal thickening in the ethmoid air cells Partial opacification of the left mastoid air cells. No fracture or dislocation. No scalp hematoma.  This report was finalized on 2/23/2025 12:15 AM by Eamon Omlaley MD.     I have personally looked at the radiology images and read the final radiology report.    Assessment & Plan    Status post left hip fracture requiring ORIF--being evaluated for SNF for subacute rehab at this time.    UTI secondary to E. coli continue Omnicef.    Alzheimer's type dementia supportive care    Questionable left lower lobe pneumonia patient on examination is clear.  Is completing Zithromax course in addition to Omnicef    Hypertension controlled    GERD continue PPI    VTE Prophylaxis:   Lovenox    3/1/2025: Patient was seen and examined.  Has no complaint at this time.  Had long discussion with her  at bedside.  Labs reviewed.         Eleazar Mcnulty MD  [facility]  03/01/25  20:52 EST

## 2025-03-03 LAB
ANION GAP SERPL CALCULATED.3IONS-SCNC: 6.9 MMOL/L (ref 5–15)
BASOPHILS # BLD AUTO: 0.12 10*3/MM3 (ref 0–0.2)
BASOPHILS NFR BLD AUTO: 1 % (ref 0–1.5)
BUN SERPL-MCNC: 18 MG/DL (ref 8–23)
BUN/CREAT SERPL: 24.3 (ref 7–25)
CALCIUM SPEC-SCNC: 9 MG/DL (ref 8.6–10.5)
CHLORIDE SERPL-SCNC: 101 MMOL/L (ref 98–107)
CO2 SERPL-SCNC: 28.1 MMOL/L (ref 22–29)
CREAT SERPL-MCNC: 0.74 MG/DL (ref 0.57–1)
DEPRECATED RDW RBC AUTO: 46.1 FL (ref 37–54)
EGFRCR SERPLBLD CKD-EPI 2021: 80.4 ML/MIN/1.73
EOSINOPHIL # BLD AUTO: 0.48 10*3/MM3 (ref 0–0.4)
EOSINOPHIL NFR BLD AUTO: 4.1 % (ref 0.3–6.2)
ERYTHROCYTE [DISTWIDTH] IN BLOOD BY AUTOMATED COUNT: 12.9 % (ref 12.3–15.4)
GLUCOSE SERPL-MCNC: 110 MG/DL (ref 65–99)
HCT VFR BLD AUTO: 35.5 % (ref 34–46.6)
HGB BLD-MCNC: 11.1 G/DL (ref 12–15.9)
IMM GRANULOCYTES # BLD AUTO: 0.15 10*3/MM3 (ref 0–0.05)
IMM GRANULOCYTES NFR BLD AUTO: 1.3 % (ref 0–0.5)
LYMPHOCYTES # BLD AUTO: 2.62 10*3/MM3 (ref 0.7–3.1)
LYMPHOCYTES NFR BLD AUTO: 22.1 % (ref 19.6–45.3)
MCH RBC QN AUTO: 30.7 PG (ref 26.6–33)
MCHC RBC AUTO-ENTMCNC: 31.3 G/DL (ref 31.5–35.7)
MCV RBC AUTO: 98.3 FL (ref 79–97)
MONOCYTES # BLD AUTO: 0.9 10*3/MM3 (ref 0.1–0.9)
MONOCYTES NFR BLD AUTO: 7.6 % (ref 5–12)
NEUTROPHILS NFR BLD AUTO: 63.9 % (ref 42.7–76)
NEUTROPHILS NFR BLD AUTO: 7.58 10*3/MM3 (ref 1.7–7)
NRBC BLD AUTO-RTO: 0 /100 WBC (ref 0–0.2)
PLATELET # BLD AUTO: 472 10*3/MM3 (ref 140–450)
PMV BLD AUTO: 8.6 FL (ref 6–12)
POTASSIUM SERPL-SCNC: 4.3 MMOL/L (ref 3.5–5.2)
RBC # BLD AUTO: 3.61 10*6/MM3 (ref 3.77–5.28)
SODIUM SERPL-SCNC: 136 MMOL/L (ref 136–145)
WBC NRBC COR # BLD AUTO: 11.85 10*3/MM3 (ref 3.4–10.8)

## 2025-03-03 PROCEDURE — 80048 BASIC METABOLIC PNL TOTAL CA: CPT | Performed by: INTERNAL MEDICINE

## 2025-03-03 PROCEDURE — 85025 COMPLETE CBC W/AUTO DIFF WBC: CPT | Performed by: INTERNAL MEDICINE

## 2025-03-03 PROCEDURE — 25010000002 ENOXAPARIN PER 10 MG: Performed by: GENERAL PRACTICE

## 2025-03-03 PROCEDURE — 97530 THERAPEUTIC ACTIVITIES: CPT

## 2025-03-03 PROCEDURE — 97535 SELF CARE MNGMENT TRAINING: CPT

## 2025-03-03 RX ADMIN — Medication 10 ML: at 08:28

## 2025-03-03 RX ADMIN — LACTULOSE 30 G: 20 SOLUTION ORAL at 12:13

## 2025-03-03 RX ADMIN — Medication 10 ML: at 21:45

## 2025-03-03 RX ADMIN — CEFDINIR 300 MG: 300 CAPSULE ORAL at 21:45

## 2025-03-03 RX ADMIN — Medication 5 MG: at 21:50

## 2025-03-03 RX ADMIN — CEFDINIR 300 MG: 300 CAPSULE ORAL at 08:27

## 2025-03-03 RX ADMIN — ENOXAPARIN SODIUM 40 MG: 100 INJECTION SUBCUTANEOUS at 08:27

## 2025-03-03 NOTE — THERAPY TREATMENT NOTE
Acute Care - Physical Therapy Treatment Note   [place]     Patient Name: Crista Blunt  : 1940  MRN: [id number]  Today's Date: 3/3/2025   Onset of Illness/Injury or Date of Surgery: 25  Visit Dx:     ICD-10-CM ICD-9-CM   1. Closed fracture of neck of left femur, initial encounter  S72.002A 820.8   2. Closed fracture of left hip, initial encounter  S72.002A 820.8     Patient Active Problem List   Diagnosis    Hip fracture     History reviewed. No pertinent past medical history.  Past Surgical History:   Procedure Laterality Date    HIP BIPOLAR REPLACEMENT Left 2025    Procedure: Left hip lexi arthroplasty;  Surgeon: Perry Cobb MD;  Location: [facility];  Service: Orthopedics;  Laterality: Left;     PT Assessment (Last 12 Hours)       PT Evaluation and Treatment       Row Name 25 1015          Physical Therapy Time and Intention    Document Type therapy note (daily note)  -     Mode of Treatment physical therapy  -     Patient Effort poor  limited 2/2 cognition  -     Comment Pt seen for therapy treatment this AM,  not at bedside. Pt more alert seemingly this date, disoriented. Time taken to assess pt and allow pt chance to participate after cues.  -       Row Name 25 1015          Cognition    Personal Safety Interventions nonskid shoes/slippers when out of bed;supervised activity  -       Row Name 25 1015          Bed Mobility    Bed Mobility supine-sit;sit-supine;scooting/bridging  -     Scooting/Bridging [place] (Bed Mobility) dependent (less than 25% patient effort);set up  -     Supine-Sit [place] (Bed Mobility) dependent (less than 25% patient effort)  -     Sit-Supine [place] (Bed Mobility) dependent (less than 25% patient effort)  -     Comment, (Bed Mobility) Pt demonstrates slight effort intermittently, difficulty with appropriate response time 2/2 confusion; sitting EOB pt able to initially participate with SARAH'abdi morales  grossly max/depA  -       Row Name 03/03/25 1015          Transfers    Comment, (Transfers) deferred this date 2/2 dependency  -       Row Name 03/03/25 1015          Motor Skills    Therapeutic Exercise --  attempted AROM of knee flex, hip add/abd  -       Row Name             Wound 02/23/25 0959 Left anterior thigh    Wound - Properties Group Placement Date: 02/23/25  -JG Placement Time: 0959  -JG Side: Left  -JG Orientation: anterior  -JG Location: thigh  -JG    Retired Wound - Properties Group Placement Date: 02/23/25  -JG Placement Time: 0959  -JG Side: Left  -JG Orientation: anterior  -JG Location: thigh  -JG    Retired Wound - Properties Group Placement Date: 02/23/25  -JG Placement Time: 0959  -JG Side: Left  -JG Orientation: anterior  -JG Location: thigh  -JG    Retired Wound - Properties Group Date first assessed: 02/23/25  -JG Time first assessed: 0959  -JG Side: Left  -JG Location: thigh  -JG      Row Name 03/03/25 1015          Positioning and Restraints    Pre-Treatment Position in bed  -     Post Treatment Position bed  -     In Bed supine;exit alarm on;with other staff;side rails up x3  -       Row Name 03/03/25 1015          Therapy Assessment/Plan (PT)    Therapy Frequency (PT) other (see comments)  1-5x/week, PRN  -       Row Name 03/03/25 1015          Progress Summary (PT)    Daily Progress Summary (PT) Pt participation poor to fair, limited 2/2 cognition. Pt has shown minimal to no progress with therapy, as confusion limits motor planning/coordination/following commands. [place] [facility] [place]               User Key  (r) = Recorded By, (t) = Taken By, (c) = Cosigned By      Initials Name Provider Type    Kristin Avilez, RN Registered Nurse    Barbara Galarza, PT Physical Therapist                    Physical Therapy Education        No education to display                  PT Recommendation and Plan  Therapy Frequency (PT): other (see comments) (1-5x/week,  PRN)  Progress Summary (PT)  Daily Progress Summary (PT): Pt participation poor to fair, limited 2/2 cognition. Pt has shown minimal to no progress with therapy, as confusion limits motor planning/coordination/following commands. Recommend extended care facility  Progress: no change       Time Calculation:    PT Charges       Row Name 03/03/25 1047             Time Calculation    Start Time 1015  -KH      PT Received On 03/03/25  -         Time Calculation- PT    Total Timed Code Minutes- PT 15 minute(s)  -                User Key  (r) = Recorded By, (t) = Taken By, (c) = Cosigned By      Initials Name Provider Type     Barbara De La Cruz, PT Physical Therapist                  Therapy Charges for Today       Code Description Service Date Service Provider Modifiers Qty    [id number]  PT THERAPEUTIC ACT EA 15 MIN 3/3/2025 Barbara De La Cruz, PT GP 1            PT G-Codes  AM-PAC 6 Clicks Score (PT): 6    Barbara De La Cruz PT  3/3/2025

## 2025-03-03 NOTE — CASE MANAGEMENT/SOCIAL WORK
Discharge Planning Assessment   [place]     Patient Name: Crista Blunt  MRN: [id number]  Today's Date: 3/3/2025    Admit Date: 2/22/2025            Discharge Plan       Row Name 03/03/25 1001       Plan    Plan SS notified by  pre-auth team that Pt has been denied for SNF placement at [facility] by her insurance. Option given for peer to peer to be completed by calling [phone] option 9, deadline is on this date at 11:00am. SS notiifed Physician Advisor with peer to peer information. SS to follow.    13:28pm: SS notified by Physician Advisor that peer to peer will not be completed due to unfortunately she is not able to participate in therapy at the SNF level. dependent for most activity and unable to get OOB. Long term care or home with 24/7 care was recommended by PA. SS notified Provider. SS to follow up with Pt's spouse.     16:03pm: SS and SS mgr spoke with Pt's spouse at bedside about SNF denial, long term care nursing home placement vs hiring 24.7 care at home. Pt's spouse states Pt's PLOF she was able to ambulate at her home, he has declined long term care nursing home placement and has requested to do a fast appeal with insurance for SNF placement. Pt's spouse declined long term care placement. Pt's spouse plans to do fast appeal on this date. SS to follow.                     Nu Arzate, KATHYW

## 2025-03-03 NOTE — PROGRESS NOTES
Adult Nutrition  Assessment/PES    Patient Name:  Crista Blunt  YOB: 1940  MRN: [id number]  Admit Date:  2/22/2025    Assessment Date:  3/3/2025    Comments:  LOS x 9    Po 58% ave of meals    Encourage po and voice food prefs    Will add Boost BID with meals to supplement po    Will cont to follow and monitor.      Reason for Assessment       Row Name 03/03/25 1342          Reason for Assessment    Reason For Assessment per organizational policy  LOS; remote [place]     Diagnosis trauma  s/p ORIF hip, UTI, Alzheimer's dementia, Pnemonia, HTN                    Nutrition/Diet History       Row Name 03/03/25 1343          Nutrition/Diet History    Typical Intake (Food/Fluid/EN/PN) Pt noted to be disoriented x 3 per documentation                    Labs/Tests/Procedures/Meds       Row Name 03/03/25 1343          Labs/Procedures/Meds    Lab Results Reviewed reviewed     Lab Results Comments glu 110        Diagnostic Tests/Procedures    Diagnostic Test/Procedure Reviewed reviewed        Medications    Pertinent Medications Reviewed reviewed                      Estimated/Assessed Needs - Anthropometrics       Row Name 03/03/25 1343          Anthropometrics    Calculation Weight 62.2 kg (137 lb 2 oz)        Estimated/Assessed Needs    Additional Documentation Estimated Calorie Needs (Group);Fluid Requirements (Group);Protein Requirements (Group)        Estimated Calorie Needs    Estimated Calorie Require (kcal/day) 1555 kcal ( 25 kcal/kg )     Estimated Calorie Need Method kcal/kg        Protein Requirements    Est Protein Requirement Amount (gms/kg) 1.2 gm protein  75 gm pro        Fluid Requirements    Estimated Fluid Requirement Method RDA Method  1555 ml                    Nutrition Prescription Ordered       Row Name 03/03/25 1349          Nutrition Prescription PO    Current PO Diet Regular                    Evaluation of Received Nutrient/Fluid Intake       Row Name 03/03/25 0908           Fluid Intake Evaluation    Oral Fluid (mL) 1068  ave x 5        PO Evaluation    Number of Meals 9     % PO Intake 58                       Problem/Interventions:   Problem 1       Row Name 03/03/25 1344          Nutrition Diagnoses Problem 1    Problem 1 Other (comment)  Inadequate energy intake related to s/p hip repair, dementia as evidenced by po                          Intervention Goal       Row Name 03/03/25 1345          Intervention Goal    General Meet nutritional needs for age/condition     PO Increase intake;PO intake (%)     PO Intake % 75 %                    Nutrition Intervention       Row Name 03/03/25 1345          Nutrition Intervention    RD/Tech Action Encourage intake;Recommend/ordered;Follow Tx progress     Recommended/Ordered Supplement                    Nutrition Prescription       Row Name 03/03/25 1345          Nutrition Prescription PO    PO Prescription Begin/change supplement     Supplement Boost     Supplement Frequency 2 times a day                    Education/Evaluation       Row Name 03/03/25 1343          Education    Education Education not appropriate at this time     Please explain Patient confusion        Monitor/Evaluation    Monitor Per protocol;I&O;PO intake;Supplement intake;Pertinent labs;Weight                     Electronically signed by:  Daily Beasley RD  03/03/25 13:45 EST

## 2025-03-03 NOTE — PLAN OF CARE
Goal Outcome Evaluation:  Plan of Care Reviewed With: patient        Progress: improving  Outcome Evaluation: Patient resting in bed at this time. Patient still unable to ambulate. Attempted to educate patient. No signs of acute distress noted at this time. VSS on RA. Patient voices no concerns at this time. Will continue with plan of care.

## 2025-03-03 NOTE — PAYOR COMM NOTE
"  Crista Connors (84 y.o. Female)       Date of Birth   1940    Social Security Number       Address   [address] [place] [id number]    Home Phone   [phone]    MRN   [id number]       [facility]    Marital Status                               Admission Date   25    Admission Type   Emergency    Admitting Provider   Heriberto Arteaga MD    Attending Provider   Nelson Rodriguez DO    Department, Room/Bed   [address], 3339/       Discharge Date       Discharge Disposition       Discharge Destination                                 Attending Provider: Nelson Rodriguez DO    Allergies: Sulfa Antibiotics    Isolation: None   Infection: None   Code Status: CPR    Ht: 165.1 cm (65\")   Wt: 62.2 kg (137 lb 2 oz)    Admission Cmt: None   Principal Problem: Hip fracture [S72.009A]                   Active Insurance as of 2025       Primary Coverage       Payor Plan Insurance Group Employer/Plan Group    HUMANA MEDICARE REPLACEMENT [employer] MEDICARE ADVANTAGE GROUP [id number]       Payor Plan Address Payor Plan Phone Number Payor Plan Fax Number Effective Dates    [address] [address] [phone]  2019 - None Entered    Formerly [facility] [id number]         Subscriber Name Subscriber Birth Date Member ID       CRISTA CONNORS 1940 [id number]                     Emergency Contacts        (Rel.) Home Phone Work Phone Mobile Phone    Donavon Connors (Spouse) [phone] -- --                 Physician Progress Notes (most recent note)        Eleazar Mcnulty MD at 25 2213              [facility]  PROGRESS NOTE     Patient Identification:  Name:  Crista Connors  Age:  84 y.o.  Sex:  female  :  1940  MRN:  [id number]  Visit Number:  [id number]  ROOM: [address] [place] [address]     Primary Care Provider:  Abby Mckenzie DO    Length of stay in inpatient status:  7    Subjective     Chief Compliant: Status post left hip fracture with " repair  Chief Complaint   Patient presents with    Fall       History of Presenting Illness: 84-year-old female with advanced dementia who is status post hip fracture with recent repair.  Patient has been treated for UTI as well during the admission.  Patient is confused from her dementia has does not seem to have any new complaints.   is at bedside and states that she is not able to do much therapy at this time secondary to pain issues with weightbearing.    Objective     Current Hospital Meds:cefdinir, 300 mg, Oral, Q12H  docusate sodium, 100 mg, Oral, BID  enoxaparin sodium, 40 mg, Subcutaneous, Q24H  ropivacaine 0.5 % 250 mg, Morphine 10 mg, cloNIDine 80 mcg, ketorolac 30 mg, EPINEPHrine 1 mg/mL 0.5 mg in sodium chloride 0.9 % 50 mL solution, , Intra-articular, Once  sodium chloride, 10 mL, Intravenous, Q12H       ----------------------------------------------------------------------------------------------------------------------  Vital Signs:  Temp:  [98 °F (36.7 °C)-98.7 °F (37.1 °C)] 98.3 °F (36.8 °C)  Resp:  [16-20] 18  BP: (101-133)/(50-65) 111/50     on   ;   Device (Oxygen Therapy): room air  Body mass index is 22.82 kg/m².    Wt Readings from Last 3 Encounters:   02/23/25 62.2 kg (137 lb 2 oz)   04/04/24 59 kg (130 lb)     Intake & Output (last 3 days)         02/25 0701 02/26 0700 02/26 0701 02/27 0700 02/27 0701 02/28 0700 02/28 0701 03/01 0700    P.O. 480 1120 680 960    I.V. (mL/kg) 0 (0) 0 (0)      Total Intake(mL/kg) 480 (7.7) 1120 (18) 680 (10.9) 960 (15.4)    Urine (mL/kg/hr) 1200 (0.8) 1250 (0.8) 1000 (0.7)     Stool  0 0     Total Output 1200 1250 1000     Net -720 -130 -320 +960            Urine Unmeasured Occurrence  1 x 3 x     Stool Unmeasured Occurrence  0 x 0 x           Diet: Regular/House; Fluid Consistency: Thin (IDDSI 0)  ----------------------------------------------------------------------------------------------------------------------  Physical exam:  Constitutional:  "No acute distress  HEENT: Normocephalic atraumatic  Neck: Supple  Cardiovascular: Regular rate and rhythm  Pulmonary/Chest: Clear to auscultation  Abdominal: Positive bowel sounds soft.   Musculoskeletal: Status post hip repair  Neurological: Advanced dementia  Skin: No rash  Peripheral vascular: No edema  Genitourinary:  ----------------------------------------------------------------------------------------------------------------------    Last echocardiogram:    ----------------------------------------------------------------------------------------------------------------------  Results from last 7 days   Lab Units 03/02/25 0139 02/27/25 1109 02/25/25  0239   WBC 10*3/mm3 10.15 9.84 12.69*   HEMOGLOBIN g/dL 10.5* 10.8* 10.7*   HEMATOCRIT % 33.2* 35.1 33.4*   MCV fL 97.9* 99.7* 97.9*   MCHC g/dL 31.6 30.8* 32.0   PLATELETS 10*3/mm3 378 284 203           Results from last 7 days   Lab Units 03/02/25 0139 02/27/25  1109 02/25/25  0752 02/25/25  0239   SODIUM mmol/L 139 138  --  136   POTASSIUM mmol/L 4.1 3.8  --  3.7   CHLORIDE mmol/L 105 105  --  105   CO2 mmol/L 26.6 23.7  --  23.2   BUN mg/dL 20 12  --  12   CREATININE mg/dL 0.64 0.66  --  0.78   CALCIUM mg/dL 8.5* 8.3*  --  8.0*   IONIZED CALCIUM mmol/L  --   --  1.08*  --    GLUCOSE mg/dL 108* 105*  --  133*   Estimated Creatinine Clearance: 65.4 mL/min (by C-G formula based on SCr of 0.64 mg/dL).  No results found for: \"AMMONIA\"              No results found for: \"HGBA1C\", \"POCGLU\"  Lab Results   Component Value Date    TSH 1.800 02/23/2025     No results found for: \"PREGTESTUR\", \"PREGSERUM\", \"HCG\", \"HCGQUANT\"  Pain Management Panel           No data to display              Brief Urine Lab Results  (Last result in the past 365 days)        Color   Clarity   Blood   Leuk Est   Nitrite   Protein   CREAT   Urine HCG        02/23/25 0537 Yellow   Clear   Negative   Small (1+)   Positive   Negative                 Blood Culture   Date Value Ref Range Status " "  02/24/2025 No growth at 4 days  Preliminary   02/24/2025 No growth at 4 days  Preliminary           Urine Culture   Date Value Ref Range Status   02/23/2025 >100,000 CFU/mL Escherichia coli (A)  Final     No results found for: \"WOUNDCX\"  No results found for: \"STOOLCX\"  Results from last 7 days   Lab Units 02/24/25  1004   PROCALCITONIN ng/mL 1.31*       I have personally looked at the labs and they are summarized above.  ----------------------------------------------------------------------------------------------------------------------  Detailed radiology reports for the last 24 hours:    Imaging Results (Last 24 Hours)       ** No results found for the last 24 hours. **          Final impressions for the last 30 days of radiology reports:    XR Pelvis 1 or 2 View    Result Date: 2/24/2025    Left total hip arthroplasty. Components appear well seated.  This report was finalized on 2/24/2025 8:30 AM by Dr. Bradley Rausch MD.      XR Chest 1 View    Result Date: 2/23/2025   Mild enlarged heart size. Coarsened bronchovascular pattern to the lungs Possible left lower lobe pneumonia. Azygos lobe variant in the right upper lobe No fracture or dislocation.  This report was finalized on 2/23/2025 7:39 PM by Eamon Omalley MD.      XR Chest 1 View    Result Date: 2/23/2025   Normal heart size Hypoinflated lungs Coarsened bronchovascular pattern to the lungs. Left lower lobe atelectasis versus pneumonia. No pleural effusion or pneumothorax No fracture or foreign body.  This report was finalized on 2/23/2025 5:27 AM by Eamon Omalley MD.      XR Tibia Fibula 2 View Left    Result Date: 2/23/2025   Intact left tibia and fibula with no acute fracture or dislocation. 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 Mild osteoarthritis at the left tibiotalar joint. No foreign body.    This report was finalized on 2/23/2025 12:20 AM by Eamon Omalley MD.      XR Femur 2 View Left    Result Date: 2/23/2025   Acute left femoral neck " fracture. No acute dislocation. Osteoarthritis.  This report was finalized on 2/23/2025 12:19 AM by Eamon Omalley MD.      XR Hip With or Without Pelvis 2 - 3 View Left    Result Date: 2/23/2025  Impression:  Acute left femoral neck fracture. No acute dislocation of the left femoral head. Mild osteoarthritis at the right and left hip joint. Intact pelvis No acute foreign body.  This report was finalized on 2/23/2025 12:18 AM by Eamon Omalley MD.      CT Cervical Spine Without Contrast    Result Date: 2/23/2025   No acute fracture or dislocation. No apical pneumothorax.  This report was finalized on 2/23/2025 12:16 AM by Eamon Omalley MD.      CT Head Without Contrast    Result Date: 2/23/2025  Impression:  Moderate generalized brain volume loss with mild to moderate chronic small vessel ischemic type changes in the white matter. Ex-vacuo dilatation of the ventricular system due to underlying deep white matter parenchymal volume loss No acute intracranial hemorrhage, mass, infarct, or edema. Minimal mucosal thickening in the ethmoid air cells Partial opacification of the left mastoid air cells. No fracture or dislocation. No scalp hematoma.  This report was finalized on 2/23/2025 12:15 AM by Eamon Omalley MD.     I have personally looked at the radiology images and read the final radiology report.    Assessment & Plan    Status post left hip fracture requiring ORIF--being evaluated for SNF for subacute rehab at this time.    UTI secondary to E. coli continue Omnicef.    Alzheimer's type dementia supportive care    Questionable left lower lobe pneumonia patient on examination is clear.  Is completing Zithromax course in addition to Omnicef    Hypertension controlled    GERD continue PPI    VTE Prophylaxis:   Lovenox    3/1/2025: Patient was seen and examined.  Has no complaint at this time.  Had long discussion with her  at bedside.  Labs reviewed.     3/2/2025: Patient was seen and examined.  Discussed  case with .  Started on bowel regiment for constipation.  Continue ceftriaxone while inpatient for UTI.   Pending discharge to SNF will be Monday    Disposition: SNF once approved    Eleazar Mcnulty MD  [facility]  03/02/25  22:13 EST    Electronically signed by Eleazar Mcnulty MD at 03/02/25 8095          Consult Notes (most recent note)        Perry Cobb MD at 02/23/25 0906        Consult Orders    1. Inpatient Orthopedic Surgery Consult [[id number]] ordered by Aysha Cardoza APRN at 02/23/25 0153                 Consult Note    Reason for Consultation: Left hip fracture    Chief complaint left hip pain x 1 day    History of present illness: History of Present Illness    The patient is an 84-year-old who has left hip pain after a fall from standing height.  She was ambulating in her kitchen last night with her significant other tripped and fell onto her left side.  She has left hip pain.  She does ambulate with a walker and a cane and with assistance by her significant other.  The significant other reports significant pain but pain has improved since IV pain Bacid.  She has no previous history of a surgery or pain to the left hip.    The patient was minimally verbal during this encounter.  Majority of the history was obtained from the nursing staff and the significant other.      Review of Systems  A complete review of systems was completed and was negative except for what is noted in the HPI.    History  History reviewed. No pertinent past medical history., History reviewed. No pertinent surgical history., History reviewed. No pertinent family history.,   Social History     Tobacco Use    Smoking status: Never     Passive exposure: Past    Smokeless tobacco: Never   Vaping Use    Vaping status: Never Used   Substance Use Topics    Alcohol use: Never    Drug use: Never   ,   No medications prior to admission.   , Scheduled Meds:  cefTRIAXone, 1,000 mg,  Intravenous, Q24H  lactated ringers, 125 mL/hr, Intravenous, Once  [Transfer Hold] ropivacaine 0.5 % 250 mg, Morphine 10 mg, cloNIDine 80 mcg, ketorolac 30 mg, EPINEPHrine 1 mg/mL 0.5 mg in sodium chloride 0.9 % 50 mL solution, , Intra-articular, Once  sodium chloride, 10 mL, Intravenous, Q12H  sodium chloride, 10 mL, Intravenous, Q12H    , Continuous Infusions:   , PRN Meds:    acetaminophen **OR** acetaminophen **OR** acetaminophen    senna-docusate sodium **AND** polyethylene glycol **AND** bisacodyl **AND** bisacodyl    HYDROcodone-acetaminophen    midazolam    Morphine **AND** naloxone    [COMPLETED] Insert Peripheral IV **AND** sodium chloride    sodium chloride    sodium chloride    sodium chloride    sodium chloride and Allergies:  Sulfa antibiotics    Objective     Vital Signs   Temp:  [97.8 °F (36.6 °C)-98.6 °F (37 °C)] 97.8 °F (36.6 °C)  Heart Rate:  [] 98  Resp:  [17-18] 17  BP: (115-167)/(54-89) 132/64    Physical Exam:  Constitutional:  Alert. Well developed and well nourished. No acute distress.      HENT:  Head: Normocephalic and atraumatic.  Mouth:  Moist mucous membranes.    Eyes:  Conjunctivae and EOM are normal.  No scleral icterus.  Neck:  Neck supple.  No JVD present.   Cardiovascular:  +2 radial, +2 dorsalis pedis, +2 posterior tibialis bilaterally   Pulmonary/Chest:  No respiratory distress. Adequate volumes noted.   Abdominal:  Soft.  No distension and no tenderness.   Musculoskeletal:     Spine- No c/t/l/s spine tenderness, Cervical ROM without pain   RUE- Painless ROM shoulder/elbow/wrist/fingers, no edema, no ecchymosis, no gross deformity, no open injury   LUE- Painless ROM shoulder/elbow/wrist/fingers, no edema, no ecchymosis, no gross deformity, no open injury   RLE- Painless ROM hip/knee/ankle, no edema, no ecchymosis, no gross deformity, no open injury   LLE-shortened externally rotated, positive logroll and axial load   Pelvis- negative pelvic rock   Neurological: SILT  Ax/LABC/m/r/u, able to perform shoulder abduction/elbow flex-ext/wrist flex-ext/finger-thumb flex-ext-abd-add, SILT s/s/dp/sp/t, able to perform ankle DF/PF/EHL  Skin:  Skin is warm and dry.  No rash noted.  No pallor.     Labs:    Lab Results (last 72 hours)       Procedure Component Value Units Date/Time    Urine Culture - Urine, Urine, Clean Catch [[id number]] Collected: 02/23/25 0537    Specimen: Urine, Clean Catch Updated: 02/23/25 0839    [place] Urine Culture Tube - Urine, Clean Catch [[id number]] Collected: 02/23/25 0537    Specimen: Urine, Clean Catch Updated: 02/23/25 0603     Extra Tube Hold for add-ons.     Comment: Auto resulted.       Urinalysis, Microscopic Only - Urine, Clean Catch [[id number]]  (Abnormal) Collected: 02/23/25 0537    Specimen: Urine, Clean Catch Updated: 02/23/25 0602     RBC, UA None Seen /HPF      WBC, UA 6-10 /HPF      Bacteria, UA 4+ /HPF      Squamous Epithelial Cells, UA 0-2 /HPF      Hyaline Casts, UA None Seen /LPF      Methodology Manual Light Microscopy    Urinalysis With Microscopic If Indicated (No Culture) - Urine, Clean Catch [[id number]]  (Abnormal) Collected: 02/23/25 0537    Specimen: Urine, Clean Catch Updated: 02/23/25 0558     Color, UA Yellow     Appearance, UA Clear     pH, UA 8.0     Specific Gravity, UA 1.018     Glucose, UA Negative     Ketones, UA Negative     Bilirubin, UA Negative     Blood, UA Negative     Protein, UA Negative     Leuk Esterase, UA Small (1+)     Nitrite, UA Positive     Urobilinogen, UA 1.0 E.U./dL    Comprehensive Metabolic Panel [[id number]]  (Abnormal) Collected: 02/23/25 0508    Specimen: Blood Updated: 02/23/25 0534     Glucose 147 mg/dL      BUN 12 mg/dL      Creatinine 0.77 mg/dL      Sodium 137 mmol/L      Potassium 3.9 mmol/L      Chloride 102 mmol/L      CO2 25.5 mmol/L      Calcium 8.7 mg/dL      Total Protein 6.6 g/dL      Albumin 4.1 g/dL      ALT (SGPT) 12 U/L      AST (SGOT) 17 U/L      Alkaline Phosphatase 93 U/L      Total  Bilirubin 0.4 mg/dL      Globulin 2.5 gm/dL      A/G Ratio 1.6 g/dL      BUN/Creatinine Ratio 15.6     Anion Gap 9.5 mmol/L      eGFR 76.6 mL/min/1.73     Narrative:      GFR Categories in Chronic Kidney Disease (CKD)      GFR Category          GFR (mL/min/1.73)    Interpretation  G1                     90 or greater         Normal or high (1)  G2                      60-89                Mild decrease (1)  G3a                   45-59                Mild to moderate decrease  G3b                   30-44                Moderate to severe decrease  G4                    15-29                Severe decrease  G5                    14 or less           Kidney failure          (1)In the absence of evidence of kidney disease, neither GFR category G1 or G2 fulfill the criteria for CKD.    eGFR calculation 2021 CKD-EPI creatinine equation, which does not include race as a factor    Protime-INR [[id number]]  (Normal) Collected: 02/23/25 0508    Specimen: Blood Updated: 02/23/25 0522     Protime 13.0 Seconds      INR 0.97    Narrative:      Suggested INR therapeutic range for stable oral anticoagulant therapy:    Low Intensity therapy:   1.5-2.0  Moderate Intensity therapy:   2.0-3.0  High Intensity therapy:   2.5-4.0    CBC Auto Differential [[id number]]  (Abnormal) Collected: 02/23/25 0508    Specimen: Blood Updated: 02/23/25 0513     WBC 14.15 10*3/mm3      RBC 4.41 10*6/mm3      Hemoglobin 13.8 g/dL      Hematocrit 42.5 %      MCV 96.4 fL      MCH 31.3 pg      MCHC 32.5 g/dL      RDW 12.6 %      RDW-SD 45.2 fl      MPV 8.7 fL      Platelets 247 10*3/mm3      Neutrophil % 85.2 %      Lymphocyte % 8.6 %      Monocyte % 5.2 %      Eosinophil % 0.0 %      Basophil % 0.4 %      Immature Grans % 0.6 %      Neutrophils, Absolute 12.06 10*3/mm3      Lymphocytes, Absolute 1.21 10*3/mm3      Monocytes, Absolute 0.74 10*3/mm3      Eosinophils, Absolute 0.00 10*3/mm3      Basophils, Absolute 0.06 10*3/mm3      Immature Grans, Absolute  0.08 10*3/mm3      nRBC 0.0 /100 WBC     TSH [[id number]]  (Normal) Collected: 02/23/25 0155    Specimen: Blood from Arm, Left Updated: 02/23/25 0458     TSH 1.800 uIU/mL     Magnesium [[id number]]  (Normal) Collected: 02/23/25 0155    Specimen: Blood from Arm, Left Updated: 02/23/25 0445     Magnesium 2.3 mg/dL     Comprehensive Metabolic Panel [[id number]]  (Abnormal) Collected: 02/23/25 0155    Specimen: Blood from Arm, Left Updated: 02/23/25 0222     Glucose 171 mg/dL      BUN 14 mg/dL      Creatinine 0.81 mg/dL      Sodium 136 mmol/L      Potassium 3.8 mmol/L      Chloride 101 mmol/L      CO2 23.9 mmol/L      Calcium 9.1 mg/dL      Total Protein 7.0 g/dL      Albumin 4.1 g/dL      ALT (SGPT) 12 U/L      AST (SGOT) 16 U/L      Alkaline Phosphatase 100 U/L      Total Bilirubin 0.4 mg/dL      Globulin 2.9 gm/dL      A/G Ratio 1.4 g/dL      BUN/Creatinine Ratio 17.3     Anion Gap 11.1 mmol/L      eGFR 72.1 mL/min/1.73     Narrative:      GFR Categories in Chronic Kidney Disease (CKD)      GFR Category          GFR (mL/min/1.73)    Interpretation  G1                     90 or greater         Normal or high (1)  G2                      60-89                Mild decrease (1)  G3a                   45-59                Mild to moderate decrease  G3b                   30-44                Moderate to severe decrease  G4                    15-29                Severe decrease  G5                    14 or less           Kidney failure          (1)In the absence of evidence of kidney disease, neither GFR category G1 or G2 fulfill the criteria for CKD.    eGFR calculation 2021 CKD-EPI creatinine equation, which does not include race as a factor    CBC & Differential [[id number]]  (Abnormal) Collected: 02/23/25 0155    Specimen: Blood from Arm, Left Updated: 02/23/25 0201    Narrative:      The following orders were created for panel order CBC & Differential.  Procedure                               Abnormality         Status                      ---------                               -----------         ------                     CBC Auto Differential[[id number]]        Abnormal            Final result                 Please view results for these tests on the individual orders.    CBC Auto Differential [[id number]]  (Abnormal) Collected: 02/23/25 0155    Specimen: Blood from Arm, Left Updated: 02/23/25 0201     WBC 16.27 10*3/mm3      RBC 4.59 10*6/mm3      Hemoglobin 14.2 g/dL      Hematocrit 43.7 %      MCV 95.2 fL      MCH 30.9 pg      MCHC 32.5 g/dL      RDW 12.6 %      RDW-SD 44.5 fl      MPV 8.7 fL      Platelets 258 10*3/mm3      Neutrophil % 89.9 %      Lymphocyte % 5.1 %      Monocyte % 4.4 %      Eosinophil % 0.0 %      Basophil % 0.2 %      Immature Grans % 0.4 %      Neutrophils, Absolute 14.62 10*3/mm3      Lymphocytes, Absolute 0.83 10*3/mm3      Monocytes, Absolute 0.71 10*3/mm3      Eosinophils, Absolute 0.00 10*3/mm3      Basophils, Absolute 0.04 10*3/mm3      Immature Grans, Absolute 0.07 10*3/mm3      nRBC 0.0 /100 WBC     [place] Draw [[id number]] Collected: 02/23/25 0155    Specimen: Blood from Arm, Left Updated: 02/23/25 0200    Narrative:      The following orders were created for panel order [place] Draw.  Procedure                               Abnormality         Status                     ---------                               -----------         ------                     Green Top (Gel)[[id number]]                                  Final result               Lavender Top[[id number]]                                     Final result               Gold Top - SST[[id number]]                                   Final result               Light Blue Top[[id number]]                                   Final result                 Please view results for these tests on the individual orders.    Green Top (Gel) [[id number]] Collected: 02/23/25 0155    Specimen: Blood from Arm, Left Updated: 02/23/25 0200     Extra Tube Hold for  add-ons.     Comment: Auto resulted.       Lavender Top [[id number]] Collected: 25    Specimen: Blood from Arm, Left Updated: 25 0200     Extra Tube hold for add-on     Comment: Auto resulted       Gold Top - SST [[id number]] Collected: 25    Specimen: Blood from Arm, Left Updated: 25 0200     Extra Tube Hold for add-ons.     Comment: Auto resulted.       Light Blue Top [[id number]] Collected: 25    Specimen: Blood from Arm, Left Updated: 25 0200     Extra Tube Hold for add-ons.     Comment: Auto resulted               Images:    Left hip and femur x-rays reviewed.  Femoral neck fracture displaced as noted.  No other fracture.  Mild arthritic changes at the hip joint.       Assessment  Left femoral neck fracture, displaced    Plan    Due to the patient's acute nature of her injury and ambulatory status I would recommend a left hip hemiarthroplasty.    This was discussed significantly with the patient's significant other.    Risk benefits alternatives and complications was discussed with the patient prior to surgery as well as the patient's significant other.    Maintain n.p.o. status for now.  Anticipate surgery 2025.      Perry Cobb MD  25  09:07 EST      Electronically signed by Perry Cobb MD at 25 0916       Nutrition Notes (most recent note)    No notes exist for this encounter.          Physical Therapy Notes (most recent note)        Barbara De La Cruz, PT at 25 1049  Version 1 of 1         Acute Care - Physical Therapy Treatment Note  ESTELLA Brock     Patient Name: Crista Blunt  : 1940  MRN: [id number]  Today's Date: 3/3/2025   Onset of Illness/Injury or Date of Surgery: 25  Visit Dx:     ICD-10-CM ICD-9-CM   1. Closed fracture of neck of left femur, initial encounter  S72.002A 820.8   2. Closed fracture of left hip, initial encounter  S72.002A 820.8     Patient Active Problem List   Diagnosis    Hip fracture     History  reviewed. No pertinent past medical history.  Past Surgical History:   Procedure Laterality Date    HIP BIPOLAR REPLACEMENT Left 2/23/2025    Procedure: Left hip lexi arthroplasty;  Surgeon: Perry Cobb MD;  Location: [facility];  Service: Orthopedics;  Laterality: Left;     PT Assessment (Last 12 Hours)       PT Evaluation and Treatment       Row Name 03/03/25 1015          Physical Therapy Time and Intention    Document Type therapy note (daily note)  -     Mode of Treatment physical therapy  -     Patient Effort poor  limited 2/2 cognition  -     Comment Pt seen for therapy treatment this AM,  not at bedside. Pt more alert seemingly this date, disoriented. Time taken to assess pt and allow pt chance to participate after cues.  -       Row Name 03/03/25 1015          Cognition    Personal Safety Interventions nonskid shoes/slippers when out of bed;supervised activity  -       Row Name 03/03/25 1015          Bed Mobility    Bed Mobility supine-sit;sit-supine;scooting/bridging  -     Scooting/Bridging [place] (Bed Mobility) dependent (less than 25% patient effort);set up  -     Supine-Sit [place] (Bed Mobility) dependent (less than 25% patient effort)  -     Sit-Supine [place] (Bed Mobility) dependent (less than 25% patient effort)  -     Comment, (Bed Mobility) Pt demonstrates slight effort intermittently, difficulty with appropriate response time 2/2 confusion; sitting EOB pt able to initially participate with UE's, hwoever grossly max/depA  -       Row Name 03/03/25 1015          Transfers    Comment, (Transfers) deferred this date 2/2 dependency  -       Row Name 03/03/25 1015          Motor Skills    Therapeutic Exercise --  attempted AROM of knee flex, hip add/abd  -       Row Name             Wound 02/23/25 0959 Left anterior thigh    Wound - Properties Group Placement Date: 02/23/25  -JG Placement Time: 0959  -JG Side: Left  -JG Orientation: anterior  -JG  Location: thigh  -JG    Retired Wound - Properties Group Placement Date: 02/23/25  -JG Placement Time: 0959 -JG Side: Left  -JG Orientation: anterior  -JG Location: thigh  -JG    Retired Wound - Properties Group Placement Date: 02/23/25  -JG Placement Time: 0959 -JG Side: Left  -JG Orientation: anterior  -JG Location: thigh  -JG    Retired Wound - Properties Group Date first assessed: 02/23/25  -JG Time first assessed: 0959 -JG Side: Left  -JG Location: thigh  -JG      Row Name 03/03/25 1015          Positioning and Restraints    Pre-Treatment Position in bed  -     Post Treatment Position bed  -     In Bed supine;exit alarm on;with other staff;side rails up x3  -       Row Name 03/03/25 1015          Therapy Assessment/Plan (PT)    Therapy Frequency (PT) other (see comments)  1-5x/week, PRN  -       Row Name 03/03/25 1015          Progress Summary (PT)    Daily Progress Summary (PT) Pt participation poor to fair, limited 2/2 cognition. Pt has shown minimal to no progress with therapy, as confusion limits motor planning/coordination/following commands. Recommend extended care facility  -               User Key  (r) = Recorded By, (t) = Taken By, (c) = Cosigned By      Initials Name Provider Type    Kristin Avilez, RN Registered Nurse    Barbara Galarza, HAYLEE Physical Therapist                    Physical Therapy Education        No education to display                  PT Recommendation and Plan  Therapy Frequency (PT): other (see comments) (1-5x/week, PRN)  Progress Summary (PT)  Daily Progress Summary (PT): Pt participation poor to fair, limited 2/2 cognition. Pt has shown minimal to no progress with therapy, as confusion limits motor planning/coordination/following commands. Recommend extended care facility  Progress: no change       Time Calculation:    PT Charges       Row Name 03/03/25 1047             Time Calculation    Start Time 1015  -KH      PT Received On 03/03/25  -         Time  Calculation- PT    Total Timed Code Minutes- PT 15 minute(s)  -TALI                User Key  (r) = Recorded By, (t) = Taken By, (c) = Cosigned By      Initials Name Provider Type    Barbara Galarza, HAYLEE Physical Therapist                  Therapy Charges for Today       Code Description Service Date Service Provider Modifiers Qty    80523985787  PT THERAPEUTIC ACT EA 15 MIN 3/3/2025 Barbara De La Cruz, PT GP 1            PT G-Codes  AM-PAC 6 Clicks Score (PT): 6    Barbara De La Cruz PT  3/3/2025      Electronically signed by Barbara De La Cruz, PT at 25 1049          Occupational Therapy Notes (most recent note)        Genoveva Alarcon, OT at 25 1104          Acute Care - Occupational Therapy Treatment Note   Vinod     Patient Name: Crista Blunt  : 1940  MRN: [id number]  Today's Date: 3/3/2025  Onset of Illness/Injury or Date of Surgery: 25     Referring Physician: Jordyn    Admit Date: 2025       ICD-10-CM ICD-9-CM   1. Closed fracture of neck of left femur, initial encounter  S72.002A 820.8   2. Closed fracture of left hip, initial encounter  S72.002A 820.8     Patient Active Problem List   Diagnosis    Hip fracture     History reviewed. No pertinent past medical history.  Past Surgical History:   Procedure Laterality Date    HIP BIPOLAR REPLACEMENT Left 2025    Procedure: Left hip lexi arthroplasty;  Surgeon: Perry Cobb MD;  Location: [facility];  Service: Orthopedics;  Laterality: Left;         OT ASSESSMENT FLOWSHEET (Last 12 Hours)       OT Evaluation and Treatment       Row Name 25 1031                   OT Time and Intention    Subjective Information no complaints  -LM        Document Type therapy note (daily note)  -LM        Mode of Treatment occupational therapy  -LM        Patient Effort poor  -LM        Comment Patient seen this am for adl retraining/education and fxl mobility.  patient currently requires total assist with all bathing, dressing, toileting,  grooming, and feeding tasks.  unable to follow commands consistently.  Disoriented x 3.  Recommend 24/7 assist upon discharge.  -LM           General Information    Existing Precautions/Restrictions hip, anterior;fall  -LM        Limitations/Impairments safety/cognitive  -LM           Cognition    Affect/Mental Status (Cognition) confused  -LM        Orientation Status (Cognition) disoriented to;person;place;situation  -LM           Bathing Assessment/Intervention    [place] Level (Bathing) dependent (less than 25% patient effort)  -LM           Upper Body Dressing Assessment/Training    [place] Level (Upper Body Dressing) dependent (less than 25% patient effort)  -LM           Lower Body Dressing Assessment/Training    [place] Level (Lower Body Dressing) dependent (less than 25% patient effort)  -LM           Grooming Assessment/Training    [place] Level (Grooming) dependent (less than 25% patient effort)  -LM           Toileting Assessment/Training    [place] Level (Toileting) dependent (less than 25% patient effort)  -LM           Transfer Assessment/Treatment    Comment, (Transfers) deferred  -LM           Motor Skills    Functional Endurance poor  -LM           Wound 02/23/25 0959 Left anterior thigh    Wound - Properties Group Placement Date: 02/23/25  -JG Placement Time: 0959 -JG Side: Left  -JG Orientation: anterior  -JG Location: thigh  -JG    Retired Wound - Properties Group Placement Date: 02/23/25  -JG Placement Time: 0959 -JG Side: Left  -JG Orientation: anterior  -JG Location: thigh  -JG    Retired Wound - Properties Group Placement Date: 02/23/25  -JG Placement Time: 0959 -JG Side: Left  -JG Orientation: anterior  -JG Location: thigh  -JG    Retired Wound - Properties Group Date first assessed: 02/23/25  -JG Time first assessed: 0959 -JG Side: Left  -JG Location: thigh  -JG       Positioning and Restraints    Post Treatment Position bed  -LM        In Bed call light within  reach;encouraged to call for assist;exit alarm on  -LM           Therapy Plan Review/Discharge Plan (OT)    Anticipated Discharge Disposition (OT) skilled nursing facility;extended care facility  -                  User Key  (r) = Recorded By, (t) = Taken By, (c) = Cosigned By      Initials Name Effective Dates    Kristin Avilez RN 08/29/23 -     LM Genoveva Alarcon OT 06/16/21 -                      Occupational Therapy Education        No education to display                      OT Recommendation and Plan              Time Calculation:     Therapy Charges for Today       Code Description Service Date Service Provider Modifiers Qty    [id number]  OT SELF CARE/MGMT/TRAIN EA 15 MIN 3/3/2025 Genoveva Alarcon OT GO 2                 Genoveva Alarcon OT  3/3/2025    Electronically signed by Genoveva Alarcon OT at 03/03/25 1104       Speech Language Pathology Notes (most recent note)    No notes exist for this encounter.

## 2025-03-03 NOTE — PROGRESS NOTES
[facility]  PROGRESS NOTE     Patient Identification:  Name:  Crista Blunt  Age:  84 y.o.  Sex:  female  :  1940  MRN:  [id number]  Visit Number:  [id number]  ROOM: [address] [place] [address]     Primary Care Provider:  Abby Mckenzie DO    Length of stay in inpatient status:  7    Subjective     Chief Compliant: Status post left hip fracture with repair  Chief Complaint   Patient presents with    Fall       History of Presenting Illness: 84-year-old female with advanced dementia who is status post hip fracture with recent repair.  Patient has been treated for UTI as well during the admission.  Patient is confused from her dementia has does not seem to have any new complaints.   is at bedside and states that she is not able to do much therapy at this time secondary to pain issues with weightbearing.    Objective     Current Hospital Meds:cefdinir, 300 mg, Oral, Q12H  docusate sodium, 100 mg, Oral, BID  enoxaparin sodium, 40 mg, Subcutaneous, Q24H  ropivacaine 0.5 % 250 mg, Morphine 10 mg, cloNIDine 80 mcg, ketorolac 30 mg, EPINEPHrine 1 mg/mL 0.5 mg in sodium chloride 0.9 % 50 mL solution, , Intra-articular, Once  sodium chloride, 10 mL, Intravenous, Q12H       ----------------------------------------------------------------------------------------------------------------------  Vital Signs:  Temp:  [98 °F (36.7 °C)-98.7 °F (37.1 °C)] 98.3 °F (36.8 °C)  Resp:  [16-20] 18  BP: (101-133)/(50-65) 111/50     on   ;   Device (Oxygen Therapy): room air  Body mass index is 22.82 kg/m².    Wt Readings from Last 3 Encounters:   25 62.2 kg (137 lb 2 oz)   24 59 kg (130 lb)     Intake & Output (last 3 days)          07 07    P.O. 480 1120 680 960    I.V. (mL/kg) 0 (0) 0 (0)      Total Intake(mL/kg) 480 (7.7) 1120 (18) 680 (10.9) 960 (15.4)    Urine (mL/kg/hr) 1200 (0.8) 1250 (0.8) 1000 (0.7)     Stool   "0 0     Total Output 1200 1250 1000     Net -720 -130 -320 +960            Urine Unmeasured Occurrence  1 x 3 x     Stool Unmeasured Occurrence  0 x 0 x           Diet: Regular/House; Fluid Consistency: Thin (IDDSI 0)  ----------------------------------------------------------------------------------------------------------------------  Physical exam:  Constitutional: No acute distress  HEENT: Normocephalic atraumatic  Neck: Supple  Cardiovascular: Regular rate and rhythm  Pulmonary/Chest: Clear to auscultation  Abdominal: Positive bowel sounds soft.   Musculoskeletal: Status post hip repair  Neurological: Advanced dementia  Skin: No rash  Peripheral vascular: No edema  Genitourinary:  ----------------------------------------------------------------------------------------------------------------------    Last echocardiogram:    ----------------------------------------------------------------------------------------------------------------------  Results from last 7 days   Lab Units 03/02/25  0139 02/27/25  1109 02/25/25  0239   WBC 10*3/mm3 10.15 9.84 12.69*   HEMOGLOBIN g/dL 10.5* 10.8* 10.7*   HEMATOCRIT % 33.2* 35.1 33.4*   MCV fL 97.9* 99.7* 97.9*   MCHC g/dL 31.6 30.8* 32.0   PLATELETS 10*3/mm3 378 284 203           Results from last 7 days   Lab Units 03/02/25  0139 02/27/25  1109 02/25/25  0752 02/25/25  0239   SODIUM mmol/L 139 138  --  136   POTASSIUM mmol/L 4.1 3.8  --  3.7   CHLORIDE mmol/L 105 105  --  105   CO2 mmol/L 26.6 23.7  --  23.2   BUN mg/dL 20 12  --  12   CREATININE mg/dL 0.64 0.66  --  0.78   CALCIUM mg/dL 8.5* 8.3*  --  8.0*   IONIZED CALCIUM mmol/L  --   --  1.08*  --    GLUCOSE mg/dL 108* 105*  --  133*   Estimated Creatinine Clearance: 65.4 mL/min (by C-G formula based on SCr of 0.64 mg/dL).  No results found for: \"AMMONIA\"              No results found for: \"HGBA1C\", \"POCGLU\"  Lab Results   Component Value Date    TSH 1.800 02/23/2025     No results found for: \"PREGTESTUR\", \"PREGSERUM\", " "\"HCG\", \"HCGQUANT\"  Pain Management Panel           No data to display              Brief Urine Lab Results  (Last result in the past 365 days)        Color   Clarity   Blood   Leuk Est   Nitrite   Protein   CREAT   Urine HCG        02/23/25 0537 Yellow   Clear   Negative   Small (1+)   Positive   Negative                 Blood Culture   Date Value Ref Range Status   02/24/2025 No growth at 4 days  Preliminary   02/24/2025 No growth at 4 days  Preliminary           Urine Culture   Date Value Ref Range Status   02/23/2025 >100,000 CFU/mL Escherichia coli (A)  Final     No results found for: \"WOUNDCX\"  No results found for: \"STOOLCX\"  Results from last 7 days   Lab Units 02/24/25  1004   PROCALCITONIN ng/mL 1.31*       I have personally looked at the labs and they are summarized above.  ----------------------------------------------------------------------------------------------------------------------  Detailed radiology reports for the last 24 hours:    Imaging Results (Last 24 Hours)       ** No results found for the last 24 hours. **          Final impressions for the last 30 days of radiology reports:    XR Pelvis 1 or 2 View    Result Date: 2/24/2025    Left total hip arthroplasty. Components appear well seated.  This report was finalized on 2/24/2025 8:30 AM by Dr. Bradley Rausch MD.      XR Chest 1 View    Result Date: 2/23/2025   Mild enlarged heart size. Coarsened bronchovascular pattern to the lungs Possible left lower lobe pneumonia. Azygos lobe variant in the right upper lobe No fracture or dislocation.  This report was finalized on 2/23/2025 7:39 PM by Eamon Omalley MD.      XR Chest 1 View    Result Date: 2/23/2025   Normal heart size Hypoinflated lungs Coarsened bronchovascular pattern to the lungs. Left lower lobe atelectasis versus pneumonia. No pleural effusion or pneumothorax No fracture or foreign body.  This report was finalized on 2/23/2025 5:27 AM by Eamon Omalley MD.      XR Tibia Fibula 2 " View Left    Result Date: 2/23/2025   Intact left tibia and fibula with no acute fracture or dislocation. 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 Mild osteoarthritis at the left tibiotalar joint. No foreign body.    This report was finalized on 2/23/2025 12:20 AM by Eamon Omalley MD.      XR Femur 2 View Left    Result Date: 2/23/2025   Acute left femoral neck fracture. No acute dislocation. Osteoarthritis.  This report was finalized on 2/23/2025 12:19 AM by Eamon Omalley MD.      XR Hip With or Without Pelvis 2 - 3 View Left    Result Date: 2/23/2025  Impression:  Acute left femoral neck fracture. No acute dislocation of the left femoral head. Mild osteoarthritis at the right and left hip joint. Intact pelvis No acute foreign body.  This report was finalized on 2/23/2025 12:18 AM by Eamon Omalley MD.      CT Cervical Spine Without Contrast    Result Date: 2/23/2025   No acute fracture or dislocation. No apical pneumothorax.  This report was finalized on 2/23/2025 12:16 AM by Eamon Omalley MD.      CT Head Without Contrast    Result Date: 2/23/2025  Impression:  Moderate generalized brain volume loss with mild to moderate chronic small vessel ischemic type changes in the white matter. Ex-vacuo dilatation of the ventricular system due to underlying deep white matter parenchymal volume loss No acute intracranial hemorrhage, mass, infarct, or edema. Minimal mucosal thickening in the ethmoid air cells Partial opacification of the left mastoid air cells. No fracture or dislocation. No scalp hematoma.  This report was finalized on 2/23/2025 12:15 AM by Eamon Omalley MD.     I have personally looked at the radiology images and read the final radiology report.    Assessment & Plan    Status post left hip fracture requiring ORIF--being evaluated for SNF for subacute rehab at this time.    UTI secondary to E. coli continue Omnicef.    Alzheimer's type dementia supportive care    Questionable left lower lobe pneumonia  patient on examination is clear.  Is completing Zithromax course in addition to Omnicef    Hypertension controlled    GERD continue PPI    VTE Prophylaxis:   Lovenox    3/1/2025: Patient was seen and examined.  Has no complaint at this time.  Had long discussion with her  at bedside.  Labs reviewed.     3/2/2025: Patient was seen and examined.  Discussed case with .  Started on bowel regiment for constipation.  Continue ceftriaxone while inpatient for UTI.   Pending discharge to SNF will be Monday    Disposition: SNF once approved    Eleazar Mcnulty MD  [facility] Hospitalist  03/02/25  22:13 EST

## 2025-03-03 NOTE — PLAN OF CARE
Goal Outcome Evaluation:  Plan of Care Reviewed With: patient        Progress: no change  Outcome Evaluation: Pt resting in bed at this time. Pt unable to ambulate. No signs of acute distress. PRN lactulose given this shift. No acute changes at this time. Plan of care ongoing.

## 2025-03-03 NOTE — THERAPY TREATMENT NOTE
Acute Care - Occupational Therapy Treatment Note   Vinod     Patient Name: Crista Blunt  : 1940  MRN: [id number]  Today's Date: 3/3/2025  Onset of Illness/Injury or Date of Surgery: 25     Referring Physician: Jordyn    Admit Date: 2025       ICD-10-CM ICD-9-CM   1. Closed fracture of neck of left femur, initial encounter  S72.002A 820.8   2. Closed fracture of left hip, initial encounter  S72.002A 820.8     Patient Active Problem List   Diagnosis    Hip fracture     History reviewed. No pertinent past medical history.  Past Surgical History:   Procedure Laterality Date    HIP BIPOLAR REPLACEMENT Left 2025    Procedure: Left hip lexi arthroplasty;  Surgeon: Perry Cobb MD;  Location: [facility];  Service: Orthopedics;  Laterality: Left;         OT ASSESSMENT FLOWSHEET (Last 12 Hours)       OT Evaluation and Treatment       Row Name 25 1031                   OT Time and Intention    Subjective Information no complaints  -LM        Document Type therapy note (daily note)  -LM        Mode of Treatment occupational therapy  -LM        Patient Effort poor  -LM        Comment Patient seen this am for adl retraining/education and fxl mobility.  patient currently requires total assist with all bathing, dressing, toileting, grooming, and feeding tasks.  unable to follow commands consistently.  Disoriented x 3.  Recommend  assist upon discharge.  -LM           General Information    Existing Precautions/Restrictions hip, anterior;fall  -LM        Limitations/Impairments safety/cognitive  -LM           Cognition    Affect/Mental Status (Cognition) confused  -LM        Orientation Status (Cognition) disoriented to;person;place;situation  -LM           Bathing Assessment/Intervention    [place] Level (Bathing) dependent (less than 25% patient effort)  -LM           Upper Body Dressing Assessment/Training    [place] Level (Upper Body Dressing) dependent (less than 25% patient  effort)  -LM           Lower Body Dressing Assessment/Training    [place] Level (Lower Body Dressing) dependent (less than 25% patient effort)  -LM           Grooming Assessment/Training    [place] Level (Grooming) dependent (less than 25% patient effort)  -LM           Toileting Assessment/Training    [place] Level (Toileting) dependent (less than 25% patient effort)  -LM           Transfer Assessment/Treatment    Comment, (Transfers) deferred  -LM           Motor Skills    Functional Endurance poor  -LM           Wound 02/23/25 0959 Left anterior thigh    Wound - Properties Group Placement Date: 02/23/25  -JG Placement Time: 0959 -JG Side: Left  -JG Orientation: anterior  -JG Location: thigh  -JG    Retired Wound - Properties Group Placement Date: 02/23/25  -JG Placement Time: 0959 -JG Side: Left  -JG Orientation: anterior  -JG Location: thigh  -JG    Retired Wound - Properties Group Placement Date: 02/23/25  -JG Placement Time: 0959 -JG Side: Left  -JG Orientation: anterior  -JG Location: thigh  -JG    Retired Wound - Properties Group Date first assessed: 02/23/25  -JG Time first assessed: 0959  -JG Side: Left  -JG Location: thigh  -JG       Positioning and Restraints    Post Treatment Position bed  -LM        In Bed call light within reach;encouraged to call for assist;exit alarm on  -LM           Therapy Plan Review/Discharge Plan (OT)    Anticipated Discharge Disposition (OT) skilled nursing facility;extended care facility  -                  User Key  (r) = Recorded By, (t) = Taken By, (c) = Cosigned By      Initials Name Effective Dates    J Kristin Haro RN 08/29/23 -     Genoveva Sanchez, OT 06/16/21 -                      Occupational Therapy Education        No education to display                      OT Recommendation and Plan              Time Calculation:     Therapy Charges for Today       Code Description Service Date Service Provider Modifiers Qty    [id number]  OT  SELF CARE/MGMT/TRAIN EA 15 MIN 3/3/2025 Genoveva Alarcon, OT GO 2                 Genoveva Alarcon, OT  3/3/2025

## 2025-03-04 PROCEDURE — 25010000002 ENOXAPARIN PER 10 MG: Performed by: GENERAL PRACTICE

## 2025-03-04 RX ADMIN — DOCUSATE SODIUM 100 MG: 100 CAPSULE, LIQUID FILLED ORAL at 22:05

## 2025-03-04 RX ADMIN — LACTULOSE 30 G: 20 SOLUTION ORAL at 09:57

## 2025-03-04 RX ADMIN — CEFDINIR 300 MG: 300 CAPSULE ORAL at 09:57

## 2025-03-04 RX ADMIN — Medication 10 ML: at 22:05

## 2025-03-04 RX ADMIN — ENOXAPARIN SODIUM 40 MG: 100 INJECTION SUBCUTANEOUS at 09:57

## 2025-03-04 RX ADMIN — Medication 10 ML: at 09:58

## 2025-03-04 RX ADMIN — CEFDINIR 300 MG: 300 CAPSULE ORAL at 22:05

## 2025-03-04 RX ADMIN — Medication 5 MG: at 22:05

## 2025-03-04 NOTE — CASE MANAGEMENT/SOCIAL WORK
Discharge Planning Assessment  ESTELLA Brock     Patient Name: Crista Blunt  MRN: [id number]  Today's Date: 3/4/2025    Admit Date: 2/22/2025            Discharge Plan       Row Name 03/04/25 1045       Plan    Plan SS contacted Pt's [employer] Medicare replacement [phone] per [facility] appeal # A [id number] remains open and pending at this time. SS to follow.                  YULIYA Pickering

## 2025-03-04 NOTE — PLAN OF CARE
Goal Outcome Evaluation:  Plan of Care Reviewed With: patient        Progress: no change  Outcome Evaluation: Pt resting in bed at this time. Pt unable to ambulate. Pt had BM this shift. VSS on room air and no signs of distress. No acute changes or complaints noted at this time. Plan of care ongoing.

## 2025-03-04 NOTE — PLAN OF CARE
Goal Outcome Evaluation:  Plan of Care Reviewed With: patient        Progress: no change  Outcome Evaluation: Patient resting in bed during shift. VSS on room air. No acute changes noted at this time, will continue with plan of care.

## 2025-03-04 NOTE — PROGRESS NOTES
[facility] HOSPITALIST PROGRESS NOTE     Patient Identification:  Name:  Crista Blunt  Age:  84 y.o.  Sex:  female  :  1940  MRN:  [id number]  Visit Number:  [id number]  ROOM: [address] [place] [address]     Primary Care Provider:  Abby Mckenzie DO    Length of stay in inpatient status:  8    Subjective     Chief Compliant:    Chief Complaint   Patient presents with    Fall       History of Presenting Illness: Patient seen and evaluated in follow-up for left hip fracture status post mechanical fall requiring ORIF.  Patient with significant history of Alzheimer's dementia with reduced interaction here in hospital and minimal involvement in therapy.  Patient with no acute complaints at time of evaluation today.  Patient present at bedside and planning to appeal recent denial for SNF rehab by insurance.    Objective     Current Hospital Meds:  cefdinir, 300 mg, Oral, Q12H  docusate sodium, 100 mg, Oral, BID  enoxaparin sodium, 40 mg, Subcutaneous, Q24H  ropivacaine 0.5 % 250 mg, Morphine 10 mg, cloNIDine 80 mcg, ketorolac 30 mg, EPINEPHrine 1 mg/mL 0.5 mg in sodium chloride 0.9 % 50 mL solution, , Intra-articular, Once  sodium chloride, 10 mL, Intravenous, Q12H         ----------------------------------------------------------------------------------------------------------------------  Vital Signs:  Temp:  [97.8 °F (36.6 °C)-99 °F (37.2 °C)] 97.8 °F (36.6 °C)  Heart Rate:  [98] 98  Resp:  [18] 18  BP: (107-131)/(56-82) 116/56     on   ;   Device (Oxygen Therapy): room air  Body mass index is 22.82 kg/m².      Intake/Output Summary (Last 24 hours) at 3/3/2025 2019  Last data filed at 3/3/2025 1832  Gross per 24 hour   Intake 1200 ml   Output 1100 ml   Net 100 ml      ----------------------------------------------------------------------------------------------------------------------  Physical exam:  Constitutional: Pleasant elderly adult female resting in bed in no distress  HENT:  Head:   "Normocephalic and atraumatic.  Mouth:  Moist mucous membranes.    Eyes:  Conjunctivae and EOM are normal. No scleral icterus.      Cardiovascular:  Normal rate, regular rhythm and normal heart sounds with no murmur.  Pulmonary/Chest:  No respiratory distress, no wheezes, no crackles, with normal breath sounds and good air movement.  Abdominal:  Soft.  Bowel sounds are normal.  No distension and no tenderness.   Musculoskeletal: Tenderness to left hip, postop dressing.  No red or swollen joints anywhere.    Neurological:  Alert and oriented to person, but not place or time.  No cranial nerve deficit.  No tongue deviation.  No facial droop.  No slurred speech. Intact Sensation throughout  Skin:  Skin is warm and dry. No rash or lesion noted. No pallor.   Peripheral vascular:  Pulses in all 4 extremities with no clubbing, no cyanosis, no edema.  Psychiatric: Appropriate mood and affect, pleasant.   ----------------------------------------------------------------------------------------------------------------------  WBC/HGB/HCT/PLT   11.85/11.1/35.5/472 (03/03 0516)  BUN/CREAT/GLUC/ALT/AST/CRISTINO/LIP    18/0.74/110/--/--/--/-- (03/03 0516)  LYTES - Na/K/Cl/CO2: 136/4.3/101/28.1 (03/03 0516)        No results found for: \"URINECX\"  No results found for: \"BLOODCX\"    I have personally looked at the labs and they are summarized above.  ----------------------------------------------------------------------------------------------------------------------  Detailed radiology reports for the last 24 hours:  No radiology results for the last day  Assessment & Plan      Left hip fracture  Status post ORIF    -Patient with minimal pain, discontinue all analgesics    -Status post orthopedic surgery consultation and subsequent surgical fixation    -Continue PT and OT    -Patient denied for subacute rehab at [facility], patient has been appealing at this time.  Follow-up with .    E. coli UTI    -Continue Omnicef to " completion    Alzheimer's dementia without agitation    -Continue supportive care    Suspected LLL pneumonia      -Completed course of azithromycin and currently on Omnicef      Copied text in portions of the note has been reviewed and is accurate as of 03/03/25    VTE Prophylaxis:     Active VTE Prophylaxis  Pharmacologic:        Start     Dose Route Frequency Stop    02/24/25 0900  Enoxaparin Sodium (LOVENOX) syringe 40 mg         40 mg SC Every 24 Hours --                  Mechanical:        Start        02/23/25 0436  Maintain Sequential Compression Device  Continuous        Comments: RIGHT LEG ONLY.                          Disposition pending clinical course and insurance appeal    Nelson Rodriguez DO  [facility] Hospitalist  03/03/25  20:19 EST

## 2025-03-05 LAB
ANION GAP SERPL CALCULATED.3IONS-SCNC: 10.7 MMOL/L (ref 5–15)
BUN SERPL-MCNC: 20 MG/DL (ref 8–23)
BUN/CREAT SERPL: 28.2 (ref 7–25)
CALCIUM SPEC-SCNC: 8.5 MG/DL (ref 8.6–10.5)
CHLORIDE SERPL-SCNC: 101 MMOL/L (ref 98–107)
CO2 SERPL-SCNC: 25.3 MMOL/L (ref 22–29)
CREAT SERPL-MCNC: 0.71 MG/DL (ref 0.57–1)
DEPRECATED RDW RBC AUTO: 46.8 FL (ref 37–54)
EGFRCR SERPLBLD CKD-EPI 2021: 84.5 ML/MIN/1.73
ERYTHROCYTE [DISTWIDTH] IN BLOOD BY AUTOMATED COUNT: 13.2 % (ref 12.3–15.4)
GLUCOSE SERPL-MCNC: 127 MG/DL (ref 65–99)
HCT VFR BLD AUTO: 38 % (ref 34–46.6)
HGB BLD-MCNC: 11.8 G/DL (ref 12–15.9)
MCH RBC QN AUTO: 30.3 PG (ref 26.6–33)
MCHC RBC AUTO-ENTMCNC: 31.1 G/DL (ref 31.5–35.7)
MCV RBC AUTO: 97.7 FL (ref 79–97)
PLATELET # BLD AUTO: 536 10*3/MM3 (ref 140–450)
PMV BLD AUTO: 8.5 FL (ref 6–12)
POTASSIUM SERPL-SCNC: 4.1 MMOL/L (ref 3.5–5.2)
RBC # BLD AUTO: 3.89 10*6/MM3 (ref 3.77–5.28)
SODIUM SERPL-SCNC: 137 MMOL/L (ref 136–145)
WBC NRBC COR # BLD AUTO: 12 10*3/MM3 (ref 3.4–10.8)

## 2025-03-05 PROCEDURE — 80048 BASIC METABOLIC PNL TOTAL CA: CPT | Performed by: STUDENT IN AN ORGANIZED HEALTH CARE EDUCATION/TRAINING PROGRAM

## 2025-03-05 PROCEDURE — 85027 COMPLETE CBC AUTOMATED: CPT | Performed by: STUDENT IN AN ORGANIZED HEALTH CARE EDUCATION/TRAINING PROGRAM

## 2025-03-05 PROCEDURE — 25010000002 ENOXAPARIN PER 10 MG: Performed by: GENERAL PRACTICE

## 2025-03-05 RX ADMIN — ACETAMINOPHEN 650 MG: 325 TABLET ORAL at 10:19

## 2025-03-05 RX ADMIN — ENOXAPARIN SODIUM 40 MG: 100 INJECTION SUBCUTANEOUS at 08:51

## 2025-03-05 RX ADMIN — Medication 10 ML: at 21:50

## 2025-03-05 RX ADMIN — Medication 5 MG: at 21:49

## 2025-03-05 RX ADMIN — Medication 10 ML: at 08:53

## 2025-03-05 NOTE — CASE MANAGEMENT/SOCIAL WORK
Discharge Planning Assessment  ESTELLA Brock     Patient Name: Crista Blunt  MRN: [id number]  Today's Date: 3/5/2025    Admit Date: 2/22/2025            Discharge Plan       Row Name 03/05/25 0927       Plan    Plan SS contacted Pt's [employer] Medicare replacement [phone] per [facility] fast appeal # A [id number] remains open and pending at this time. SS to follow.             YULIYA Pickering

## 2025-03-05 NOTE — PLAN OF CARE
Goal Outcome Evaluation:  Plan of Care Reviewed With: patient        Progress: no change  Outcome Evaluation: Patient rested in bed during shift. VSS on RA. Pt remains confused. Dressing on L hip with dried drainage. No concerns or complaints at this time. Will continue with plan of care.

## 2025-03-05 NOTE — SIGNIFICANT NOTE
03/05/25 1326   OTHER   Discipline physical therapist   Recommendation   PT - Next Appointment   (Pt seen this date,  present and assistive at bedside. Pt unable to participate 2/2 cognition, attempted LE TE however pt unable to initiate movement for effective strengthening. Pt did have pain medication earlier that could be affecting focus.)

## 2025-03-05 NOTE — PLAN OF CARE
Goal Outcome Evaluation:           Progress: no change  Outcome Evaluation: Patient resting in bed at this time. Alert to self only,  at bedside. Patient unable to ambulate due to mental status. PRN pain medication given for pain per MAR. No acute distress noted at this time. Will continue with POC.

## 2025-03-05 NOTE — PROGRESS NOTES
[facility] HOSPITALIST PROGRESS NOTE     Patient Identification:  Name:  Crista Blunt  Age:  84 y.o.  Sex:  female  :  1940  MRN:  [id number]  Visit Number:  [id number]  ROOM: [address] [place] [address]     Primary Care Provider:  Abby Mckenzie DO    Length of stay in inpatient status:  9    Subjective     Chief Compliant:    Chief Complaint   Patient presents with    Fall       History of Presenting Illness: Patient seen and evaluated in follow-up for left hip fracture status post mechanical fall requiring ORIF.  Patient with significant history of Alzheimer's dementia with reduced interaction here in hospital and minimal involvement in therapy.  Patient with no acute complaints at time of evaluation today.  Patient appeal of insurance denial for SNF rehab currently still pending.  Patient  not at bedside today but friend visiting at time of exam.    Objective     Current Hospital Meds:  cefdinir, 300 mg, Oral, Q12H  docusate sodium, 100 mg, Oral, BID  enoxaparin sodium, 40 mg, Subcutaneous, Q24H  melatonin, 5 mg, Oral, Nightly  sodium chloride, 10 mL, Intravenous, Q12H         ----------------------------------------------------------------------------------------------------------------------  Vital Signs:  Temp:  [97.6 °F (36.4 °C)-97.8 °F (36.6 °C)] 97.8 °F (36.6 °C)  Heart Rate:  [] 101  Resp:  [18] 18  BP: (104-126)/(60-63) 126/63     on   ;   Device (Oxygen Therapy): room air  Body mass index is 22.82 kg/m².      Intake/Output Summary (Last 24 hours) at 3/4/2025 2008  Last data filed at 3/4/2025 1700  Gross per 24 hour   Intake 620 ml   Output 810 ml   Net -190 ml      ----------------------------------------------------------------------------------------------------------------------  Physical exam:  Constitutional: Pleasant elderly adult female resting in bed in no distress  HENT:  Head:  Normocephalic and atraumatic.  Mouth:  Moist mucous membranes.    Eyes:   I will decide at office visit, if asymptomatic would not need it.     "Conjunctivae and EOM are normal. No scleral icterus.      Cardiovascular:  Normal rate, regular rhythm and normal heart sounds with no murmur.  Pulmonary/Chest:  No respiratory distress, no wheezes, no crackles, with normal breath sounds and good air movement.  Abdominal:  Soft.  Bowel sounds are normal.  No distension and no tenderness.   Musculoskeletal: Tenderness to left hip, postop dressing.  No red or swollen joints anywhere.    Neurological:  Alert and oriented to person, but not place or time.  No cranial nerve deficit.  No tongue deviation.  No facial droop.  No slurred speech. Intact Sensation throughout  Skin:  Skin is warm and dry. No rash or lesion noted. No pallor.   Peripheral vascular:  Pulses in all 4 extremities with no clubbing, no cyanosis, no edema.  Psychiatric: Appropriate mood and affect, pleasant.   ----------------------------------------------------------------------------------------------------------------------                 No results found for: \"URINECX\"  No results found for: \"BLOODCX\"    I have personally looked at the labs and they are summarized above.  ----------------------------------------------------------------------------------------------------------------------  Detailed radiology reports for the last 24 hours:  No radiology results for the last day  Assessment & Plan      Left hip fracture  Status post ORIF    -Patient with minimal pain, discontinue all analgesics    -Status post orthopedic surgery consultation and subsequent surgical fixation    -Continue PT and OT    -Patient denied for subacute rehab at [facility], patient has been appealing at this time.  Follow-up with .    E. coli UTI    -Continue Omnicef to completion    Alzheimer's dementia without agitation    -Continue supportive care    Suspected LLL pneumonia, gram-negative bacterial    -Completed course of azithromycin and currently on Omnicef      Copied text in portions of the note has been " reviewed and is accurate as of 03/04/25    VTE Prophylaxis:     Active VTE Prophylaxis  Pharmacologic:        Start     Dose Route Frequency Stop    02/24/25 0900  Enoxaparin Sodium (LOVENOX) syringe 40 mg         40 mg SC Every 24 Hours --                  Mechanical:        Start        02/23/25 0436  Maintain Sequential Compression Device  Continuous        Comments: RIGHT LEG ONLY.                          Disposition pending clinical course and insurance appeal    Nelson Rodriguez DO  [facility] Hospitalist  03/04/25  20:08 EST

## 2025-03-06 PROCEDURE — 25010000002 ENOXAPARIN PER 10 MG: Performed by: GENERAL PRACTICE

## 2025-03-06 PROCEDURE — 97530 THERAPEUTIC ACTIVITIES: CPT

## 2025-03-06 RX ADMIN — Medication 10 ML: at 20:14

## 2025-03-06 RX ADMIN — Medication 10 ML: at 09:08

## 2025-03-06 RX ADMIN — ENOXAPARIN SODIUM 40 MG: 100 INJECTION SUBCUTANEOUS at 09:07

## 2025-03-06 RX ADMIN — ACETAMINOPHEN 650 MG: 325 TABLET ORAL at 20:14

## 2025-03-06 RX ADMIN — Medication 5 MG: at 20:14

## 2025-03-06 NOTE — CASE MANAGEMENT/SOCIAL WORK
Discharge Planning Assessment  [facility]     Patient Name: rCista Blunt  MRN: [id number]  Today's Date: 3/6/2025    Admit Date: 2/22/2025    Plan: SS followed up with pt's expedited appeal [phone] per [facility] who states pt's expedited appeal has been approved with auth # [id number]. SS contacted [facility] per [facility] on this date who states  they do not have any beds available. SS contacted [facility] per [facility] who states they have beds available and agreeable to review referral. SS placed referral in Epic in basket. SS spoke with pt's spouse at bedside to provide an update. Pt's spouse requested for SS to check [facility] bed availablitiy aswell. SS contacted [facility] per [facility] to check bed availability. SS notified provider with an update. SS to follow.       Discharge Plan       Row Name 03/06/25 1630       Plan    Plan SS followed up with pt's expedited appeal [phone] per [facility] who states pt's expedited appeal has been approved with auth # [id number]. SS contacted [facility] per [facility] on this date who states  they do not have any beds available. SS contacted [facility] per [facility] who states they have beds available and agreeable to review referral. SS placed referral in Epic in basket. SS spoke with pt's spouse at bedside to provide an update. Pt's spouse requested for SS to check [facility] bed availablitiy aswell. SS contacted [facility] per [facility] to check bed availability. SS notified provider with an update. SS to follow.        Continued Care and Services - Admitted Since 2/22/2025       Destination       Service Provider Request Status Services Address Phone Fax Patient Preferred    [facility] Pending - Request Sent -- [address] [place] [address] [phone] --    [facility] Declined  Bed not available -- [address] [address] [phone] --    [facility]  Declined  Insurance Denial, no long term care beds available. -- [address] [facility] [place] [address] [phone] --        Expected Discharge Date and Time       Expected Discharge Date Expected Discharge Time    Mar 6, 2025               YULIYA Valdivia

## 2025-03-06 NOTE — PLAN OF CARE
Goal Outcome Evaluation:  Plan of Care Reviewed With: patient        Progress: no change  Outcome Evaluation: Patient rested in bed during shift. Pt remains confused, alert to self. VSS on RA. Pt could not ambulate due to altered mental status. No conerns or complaints at this time. Will continue with plan of care.

## 2025-03-06 NOTE — DISCHARGE PLACEMENT REQUEST
"Crista Connors (84 y.o. Female)       Date of Birth   1940    Social Security Number       Address   [facility] [address] [address]    Home Phone   [phone]    MRN   [id number]       [facility]    Marital Status                               Admission Date   2/22/25    Admission Type   Emergency    Admitting Provider   Heriberto Arteaga MD    Attending Provider   Nelson Rodriguez DO    Department, Room/Bed   [address], 3339/1P       Discharge Date       Discharge Disposition       Discharge Destination                                 Attending Provider: Nelson Rordiguez DO    Allergies: Sulfa Antibiotics    Isolation: None   Infection: None   Code Status: CPR    Ht: 165.1 cm (65\")   Wt: 62.2 kg (137 lb 2 oz)    Admission Cmt: None   Principal Problem: Hip fracture [S72.009A]                   Active Insurance as of 2/22/2025       Primary Coverage       Payor Plan Insurance Group Employer/Plan Group    HUMANA MEDICARE REPLACEMENT HU[employer]NA MEDICARE ADVANTAGE GROUP [id number]       Payor Plan Address Payor Plan Phone Number Payor Plan Fax Number Effective Dates    [address] [address] [phone]  1/1/2019 - None Entered    [facility] [id number]         Subscriber Name Subscriber Birth Date Member ID       CRISTA CONNORS 1940 [id number]                     Emergency Contacts        (Rel.) Home Phone Work Phone Mobile Phone    Donavon Connors (Spouse) [phone] -- --              Insurance Information                  [employer] MEDICARE REPLACEMENT/[employer] MEDICARE ADVANTAGE GROUP Phone: [phone]    Subscriber: Crista Connors Subscriber#: [id number]    Group#: [id number] Precert#: --    Authorization#: -- Effective Date: --             History & Physical        Aysha Cardoza APRN at 02/23/25 0306       Attestation signed by Heriberto Arteaga MD at 02/23/25 1437    I have reviewed this documentation and agree.  I have discussed and formulated the assessment and " plan with ZABRINA Olmstead.                      [facility] Medicine Services  History & Physical    Patient Identification:  Name:  Crista Blunt  Age:  84 y.o.  Sex:  female  :  1940  MRN:  [id number]   Visit Number:  [id number]  Admit Date: 2025   Primary Care Physician:  Abby Mckenzie DO    Subjective     Chief complaint: Fall    History of presenting illness:      Crista Blunt is a 84 y.o. female with past medical history significant for severe Alzheimer's dementia, hypercholesterolemia, hypertension, GERD, osteoarthritis, depression, anxiety, and advanced age.  Patient transported to [facility] emergency department for further evaluation of left hip pain after experiencing a mechanical fall at home.  Patient does have severe dementia and was unable to provide details for HPI.  However, spouse was at bedside.  He states that he was helping patient walk to the living room from the kitchen when the patient went into a direction he was not expecting, causing patient to fall and hit her head and the left hip.  He states that he and family member had a very difficult time getting the patient up out of the floor.  He states that patient typically does have a hard time ambulating, although usually she is a x 1 assist. He states that patient has not taken any home medications in approximately 2 years. Spouse states that patient has a poor appetite and usually only eats 1 meal per day.  During my evaluation, patient was awake, calm, cooperative, and appearing in no acute distress.  She denied any pain at the time of my exam.  She was able to state her name.  She was unable to answer any other pertinent questions or orientation questions.  The left lower extremity appears neurovascularly intact.  Left lower extremity appears shortened and externally rotated.  X-ray of the left hip obtained in the ED revealed acute left femoral neck fracture without dislocation. ED  provider discussed with orthopedic surgery who accepted patient in consult.  Patient will be [facility] the [facility] floor for further monitoring, evaluation, and treatment.  Discussed plans with patient's spouse who voices agreement and understanding with no further questions or concerns at this time.    Upon arrival to the ED, vital signs were temperature 98, pulse 90, respirations 18, blood pressure 167/89, SpO2 saturation 92% on room air.  CMP with glucose 171, otherwise unremarkable.  CBC with WBCs 16.27, otherwise unremarkable.  Chest x-ray pending.  X-ray of the left hip noted acute left femoral neck fracture.  No acute dislocation of the left femoral head.  Mild osteoarthritis at the right and left hip joint.  Intact pelvis.  No acute foreign body.  X-ray of the left tib-fib unremarkable.  X-ray of the left femur noted acute left femoral neck fracture.  No acute dislocation.  CT of the head without contrast noted moderate generalized brain volume loss with mild to moderate chronic small vessel ischemic type changes in the white matter.  Ex vacuo dilatation of the ventricular system due to underlying deep white matter parenchymal volume loss.  No acute intracranial hemorrhage, mass, infarct, or edema.  No scalp hematoma.  CT of the cervical spine without contrast unremarkable.  No acute fracture or dislocation.  No apical pneumothorax.    Known Emergency Department medications received prior to my evaluation included N/A (no medications given while in ED). Emergency Department Room location at the time of my evaluation was [facility]. Discussed with admitting physician, Heriberto Thornton MD.      ---------------------------------------------------------------------------------------------------------------------   Review of Systems   Unable to perform ROS: Dementia     ---------------------------------------------------------------------------------------------------------------------   History reviewed. No  pertinent past medical history.  History reviewed. No pertinent surgical history.  History reviewed. No pertinent family history.  Social History     Socioeconomic History    Marital status:      ---------------------------------------------------------------------------------------------------------------------   Allergies:  Sulfa antibiotics  ---------------------------------------------------------------------------------------------------------------------   Home medications:    Medications below are reported home medications pulling from within the system; at this time, these medications have not been reconciled unless otherwise specified and are in the verification process for further verifcation as current home medications.  (Not in a hospital admission)      Hospital Scheduled Meds:  Morphine, 2 mg, Intravenous, Once  ondansetron, 4 mg, Intravenous, Once           Current listed hospital scheduled medications may not yet reflect those currently placed in orders that are signed and held awaiting patient's arrival to floor.   ---------------------------------------------------------------------------------------------------------------------     Objective     Vital Signs:  Temp:  [98 °F (36.7 °C)] 98 °F (36.7 °C)  Heart Rate:  [] 100  Resp:  [18] 18  BP: (140-167)/(87-89) 140/87      02/22/25 2023   Weight: 59 kg (130 lb 1.1 oz)     Body mass index is 21.64 kg/m².  ---------------------------------------------------------------------------------------------------------------------       Physical Exam  Vitals reviewed.   Constitutional:       General: She is awake. She is not in acute distress.     Appearance: She is ill-appearing (chronically). She is not diaphoretic.   HENT:      Head: Normocephalic and atraumatic.      Mouth/Throat:      Mouth: Mucous membranes are dry.   Eyes:      Extraocular Movements: Extraocular movements intact.      Pupils: Pupils are equal, round, and reactive to light.    Cardiovascular:      Rate and Rhythm: Normal rate and regular rhythm.      Pulses: Normal pulses.           Dorsalis pedis pulses are 2+ on the right side and 2+ on the left side.      Heart sounds: Normal heart sounds. No murmur heard.     No friction rub.   Pulmonary:      Effort: Pulmonary effort is normal. No accessory muscle usage, respiratory distress or retractions.      Breath sounds: No wheezing, rhonchi or rales.   Abdominal:      General: Abdomen is flat. Bowel sounds are normal. There is no distension.      Palpations: Abdomen is soft.      Tenderness: There is no abdominal tenderness. There is no guarding.   Musculoskeletal:      Cervical back: Neck supple. No rigidity.      Right lower leg: No edema.      Left lower leg: No edema.      Comments: LLE appears shortened and externally rotated. Appearing neurovascularly intact upon my exam.    Skin:     General: Skin is warm and dry.      Capillary Refill: Capillary refill takes 2 to 3 seconds.      Coloration: Skin is pale.   Neurological:      Mental Status: She is alert. Mental status is at baseline. She is disoriented.      Sensory: Sensation is intact.      Motor: Weakness (generalized) present. No tremor.   Psychiatric:         Attention and Perception: She is inattentive.         Mood and Affect: Mood is not anxious.         Behavior: Behavior is not agitated, aggressive or combative. Behavior is cooperative.         Cognition and Memory: Cognition is impaired. Memory is impaired.       ---------------------------------------------------------------------------------------------------------------------  EKG: Performed in ED during my evaluation. Revealed normal sinus rhythm/sinus tachycardia 100 without evidence of acute ischemia. Image upload pending.     Telemetry:  Patient not on continuous cardiac monitoring in Hallway Bed 101 in ED.  "  ---------------------------------------------------------------------------------------------------------------------   Results from last 7 days   Lab Units 02/23/25  0155   WBC 10*3/mm3 16.27*   HEMOGLOBIN g/dL 14.2   HEMATOCRIT % 43.7   MCV fL 95.2   MCHC g/dL 32.5   PLATELETS 10*3/mm3 258         Results from last 7 days   Lab Units 02/23/25  0155   SODIUM mmol/L 136   POTASSIUM mmol/L 3.8   CHLORIDE mmol/L 101   CO2 mmol/L 23.9   BUN mg/dL 14   CREATININE mg/dL 0.81   CALCIUM mg/dL 9.1   GLUCOSE mg/dL 171*   ALBUMIN g/dL 4.1   BILIRUBIN mg/dL 0.4   ALK PHOS U/L 100   AST (SGOT) U/L 16   ALT (SGPT) U/L 12   Estimated Creatinine Clearance: 49 mL/min (by C-G formula based on SCr of 0.81 mg/dL).  No results found for: \"AMMONIA\"          No results found for: \"HGBA1C\"  Lab Results   Component Value Date    TSH 2.420 04/04/2024     No results found for: \"PREGTESTUR\", \"PREGSERUM\", \"HCG\", \"HCGQUANT\"  Pain Management Panel           No data to display                  ---------------------------------------------------------------------------------------------------------------------  Imaging Results (Last 7 Days)       Procedure Component Value Units Date/Time    XR Chest 1 View [[id number]] Resulted: 02/23/25 0204     Updated: 02/23/25 0223    XR Tibia Fibula 2 View Left [[id number]] Collected: 02/23/25 0019     Updated: 02/23/25 0022    Narrative:      PROCEDURE: X-ray examination of the left tibia and fibula performed on  February 22, 2025. 3 views.     HISTORY: Fall. Leg pain.     COMPARISON: None.     FINDINGS:     Intact left tibia and fibula with no acute fracture or dislocation.  No lytic or blastic lesion. No foreign body.  No tibiotalar joint effusion.  No soft tissue swelling.       Impression:         Intact left tibia and fibula with no acute fracture or dislocation.  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  Mild osteoarthritis at the left tibiotalar joint.  No foreign body.           This report was " finalized on 2/23/2025 12:20 AM by Eamon Omalley MD.       XR Femur 2 View Left [[id number]] Collected: 02/23/25 0018     Updated: 02/23/25 0021    Narrative:      PROCEDURE: X-ray examination of the left femur performed on February 22, 2025. 4 films.     HISTORY: Left leg pain. Hip pain. Fall.     COMPARISON: None.     FINDINGS:     Acute left femoral neck fracture.  No acute dislocation of the left femoral head.  Mild osteoarthritis at the left hip joint  Mild osteoarthritis at the left knee.  No acute foreign body.       Impression:         Acute left femoral neck fracture.  No acute dislocation.  Osteoarthritis.     This report was finalized on 2/23/2025 12:19 AM by Eamon Omalley MD.       XR Hip With or Without Pelvis 2 - 3 View Left [[id number]] Collected: 02/23/25 0016     Updated: 02/23/25 0020    Narrative:      PROCEDURE: X-ray examination of the pelvis and left hip performed on  February 22, 2025. 3 views.     HISTORY: Fall. Pain.     COMPARISON: None.     FINDINGS:     Intact pelvis  Intact SI joints and intact pubic symphysis.  Mild osteoarthritis at the right and left hip joint.  Degenerative disc disease in the lower lumbar spine.  Acute left femoral neck fracture.   No acute dislocation of the left femoral head.       Impression:      Impression:     Acute left femoral neck fracture.   No acute dislocation of the left femoral head.  Mild osteoarthritis at the right and left hip joint.  Intact pelvis  No acute foreign body.     This report was finalized on 2/23/2025 12:18 AM by Eamon Omalley MD.       CT Cervical Spine Without Contrast [[id number]] Collected: 02/23/25 0015     Updated: 02/23/25 0018    Narrative:      PROCEDURE: CT of the cervical spine performed without IV contrast on  February 22, 2025. The examination was performed with 2 mm axial imaging  and 2 mm sagittal coronal reconstruction images. Examination was  performed according to as low as reasonably achievable dose protocol.      HISTORY: Fall. Head injury. Neck injury.     COMPARISON: None.     FINDINGS:     Anatomic alignment of the cervical vertebral bodies with no acute  fracture or dislocation.  Normal thickness of the prevertebral soft tissues  No lytic or blastic lesion.       Impression:         No acute fracture or dislocation.   No apical pneumothorax.     This report was finalized on 2/23/2025 12:16 AM by Eamon Omalley MD.       CT Head Without Contrast [[id number]] Collected: 02/23/25 0012     Updated: 02/23/25 0017    Narrative:      PROCEDURE: CT brain performed without IV contrast on February 22, 2025.  The examination was performed with 3 mm axial imaging and 3 mm sagittal  and coronal reconstruction images. The examination was performed  according to as low as reasonably achievable dose protocol.     HISTORY: Fall. Head injury. Neck injury.     COMPARISON: None.     FINDINGS:     Moderate generalized brain volume loss  Mild to moderate chronic small vessel ischemic type changes in the white  matter.  Deep white matter parenchymal volume loss with associated ex vacuo  dilatation of the lateral ventricles and basal cisterns.  No acute intracranial hemorrhage, mass, infarct, or edema.  Mild mucosal thickening in the ethmoid air cells.  High jugular bulb noted on the right side.  Debris noted within the right and left external auditory canal, right  greater than left.  Left side mastoiditis  Minimal mucosal thickening in the ethmoid air cells  No fracture.  No scalp hematoma.       Impression:      Impression:     Moderate generalized brain volume loss with mild to moderate chronic  small vessel ischemic type changes in the white matter.  Ex-vacuo dilatation of the ventricular system due to underlying deep  white matter parenchymal volume loss  No acute intracranial hemorrhage, mass, infarct, or edema.  Minimal mucosal thickening in the ethmoid air cells  Partial opacification of the left mastoid air cells.  No fracture or  dislocation.  No scalp hematoma.     This report was finalized on 2/23/2025 12:15 AM by Eamon Omalley MD.               Last echocardiogram:  No previous echo on file.     I have personally reviewed the above radiology images and read the final radiology report on 02/23/25  ---------------------------------------------------------------------------------------------------------------------  Assessment / Plan     Active Hospital Problems    Diagnosis  POA    **Hip fracture [S72.009A]  Yes       ASSESSMENT/PLAN:  #Acute non-displaced left femoral neck fracture s/p mechanical fall at home prior to arrival   #Leukocytosis (POA), likely reactive   #Severe Alzheimer's dementia  #Hypercholesterolemia  #Hypertension  #GERD  #Osteoarthritis  #Depression and anxiety  #Advanced age  -Radiological images reviewed.   -Orthopedic surgery consulted, input/assistance is much appreciated.   -PRN IV/PO pain medication available with holding parameters to prevent hypotension, oversedation, and/or respiratory depression.   -Continuous pulse oximetry ordered.   -Neurovascular checks Q 4 hours.   -NPO pending surgical evaluation/intervention.  -Strict Bedrest.   -Fall precautions.   -May utilize external urinary catheter.   -No home medications to review as patient reportedly has been off all medications for the past 2 years. Spouse states that he does occasionally give her a THC gummy.   -Provide reorientation/redirection as necessary.   -Utilize bed alarm.  -PT/OT consults for postoperative evaluation.   -Case management consulted for assistance with discharge plans.   -VS currently stable; continue to closely monitor VS per hospital policy.   -Repeat labs in the AM (CBC and CMP). Obtain PT-INR.   -Preop EKG ordered with no concern for acute ischemia; revealed NSR/.  -Preop COVID-19 and Flu swab ordered, pending.      ----------  -DVT prophylaxis: SCD (Right leg ONLY).   -Activity: Bedrest.   -Expected length of stay:    INPATIENT status due to the need for care which can only be reasonably provided in an hospital setting such as aggressive/expedited ancillary services and/or consultation services, the necessity for IV medications, close physician monitoring and/or the possible need for procedures.  In such, I feel patient's risk for adverse outcomes and need for care warrant INPATIENT evaluation and predict the patient's care encounter to likely last beyond 2 midnights.   -Disposition pending clinical course.     High risk secondary to acute non-displaced left femoral neck fracture status post mechanical fall at home prior to arrival, severe Alzheimer's dementia, and advanced age.     CODE STATUS: DNR/DNI, discussed with spouse at bedside in ED (H101).       HUBERT Andrea   02/23/25  03:27 EST  Pager #[phone]  ---------------------------------------------------------------------------------------------------------------------        Electronically signed by Heriberto Arteaga MD at 02/23/25 0504       Current Facility-Administered Medications   Medication Dose Route Frequency Provider Last Rate Last Admin    acetaminophen (TYLENOL) tablet 650 mg  650 mg Oral Q4H PRN Perry Cobb MD   650 mg at 03/05/25 1019    Or    acetaminophen (TYLENOL) 160 MG/5ML oral solution 650 mg  650 mg Oral Q4H PRN Perry Cobb MD   650 mg at 02/25/25 0351    Or    acetaminophen (TYLENOL) suppository 650 mg  650 mg Rectal Q4H PRN Perry Cobb MD   650 mg at 02/23/25 1338    sennosides-docusate (PERICOLACE) 8.6-50 MG per tablet 2 tablet  2 tablet Oral BID PRN Perry Cobb MD   2 tablet at 03/02/25 1057    And    polyethylene glycol (MIRALAX) packet 17 g  17 g Oral Daily PRN Perry Cobb MD   17 g at 02/27/25 0844    And    bisacodyl (DULCOLAX) EC tablet 5 mg  5 mg Oral Daily PRN Perry Cobb MD        And    bisacodyl (DULCOLAX) suppository 10 mg  10 mg Rectal Daily PRN Perry Cobb MD        Calcium Replacement - Follow Nurse /  BPA Driven Protocol   Not Applicable PRDeandre Clancy MD        docusate sodium (COLACE) capsule 100 mg  100 mg Oral BID Eleazar Mcnulty MD   100 mg at 25 2205    Enoxaparin Sodium (LOVENOX) syringe 40 mg  40 mg Subcutaneous Q24H Perry Cobb MD   40 mg at 25 0907    lactulose (CHRONULAC) 10 GM/15ML solution 30 g  30 g Oral Q12H PRN Eleazar Mcnulty MD   30 g at 25 0957    Magnesium Standard Dose Replacement - Follow Nurse / BPA Driven Protocol   Not Applicable Deandre Szymanski MD        melatonin tablet 5 mg  5 mg Oral Nightly Nelson Rodriguez DO   5 mg at 25 2149    Phosphorus Replacement - Follow Nurse / BPA Driven Protocol   Not Applicable Deandre Szymanski MD        Potassium Replacement - Follow Nurse / BPA Driven Protocol   Not Applicable Deandre Szymanski MD        sodium chloride 0.9 % flush 10 mL  10 mL Intravenous PRN Perry Cobb MD        sodium chloride 0.9 % flush 10 mL  10 mL Intravenous Q12H Perry Cobb MD   10 mL at 25 0908    sodium chloride 0.9 % flush 10 mL  10 mL Intravenous PRN Perry Cobb MD        sodium chloride 0.9 % infusion 40 mL  40 mL Intravenous PRN Perry Cobb MD            Physician Progress Notes (most recent note)        Nelson Rodriguez DO at 25 1925              [facility]     Patient Identification:  Name:  Crista Blunt  Age:  84 y.o.  Sex:  female  :  1940  MRN:  [id number]  Visit Number:  [id number]  ROOM: [address] [place] [address]     Primary Care Provider:  Abby Mckenzie DO    Length of stay in inpatient status:  10    Subjective     Chief Compliant:    Chief Complaint   Patient presents with    Fall       History of Presenting Illness: Patient seen and evaluated in follow-up for left hip fracture status post mechanical fall requiring ORIF.  Patient with significant history of Alzheimer's dementia with reduced interaction here in hospital and involvement in therapy.   Patient with no acute complaints at time of evaluation today.  Patient appeal of insurance denial for SNF rehab currently still pending.  Patient  reports being notified case to be reviewed 3/6/2025.    Objective     Current Hospital Meds:  docusate sodium, 100 mg, Oral, BID  enoxaparin sodium, 40 mg, Subcutaneous, Q24H  melatonin, 5 mg, Oral, Nightly  sodium chloride, 10 mL, Intravenous, Q12H         ----------------------------------------------------------------------------------------------------------------------  Vital Signs:  Temp:  [97.6 °F (36.4 °C)-98.5 °F (36.9 °C)] 98.5 °F (36.9 °C)  Heart Rate:  [89-97] 89  Resp:  [16-18] 16  BP: (112-134)/(52-76) 123/62  SpO2:  [92 %-98 %] 98 %  on   ;   Device (Oxygen Therapy): room air  Body mass index is 22.82 kg/m².      Intake/Output Summary (Last 24 hours) at 3/5/2025 1925  Last data filed at 3/5/2025 1843  Gross per 24 hour   Intake 740 ml   Output 1300 ml   Net -560 ml      ----------------------------------------------------------------------------------------------------------------------  Physical exam:  Constitutional: Pleasant elderly adult female resting in bed in no distress  HENT:  Head:  Normocephalic and atraumatic.  Mouth:  Moist mucous membranes.    Eyes:  Conjunctivae and EOM are normal. No scleral icterus.      Cardiovascular:  Normal rate, regular rhythm and normal heart sounds with no murmur.  Pulmonary/Chest:  No respiratory distress, no wheezes, no crackles, with normal breath sounds and good air movement.  Abdominal:  Soft.  Bowel sounds are normal.  No distension and no tenderness.   Musculoskeletal: Tenderness to left hip, postop dressing.  No red or swollen joints anywhere.    Neurological:  Alert and responds to name but is not oriented to place or time or situation.  No cranial nerve deficit.  No tongue deviation.  No facial droop.  No slurred speech. Intact Sensation throughout  Skin:  Skin is warm and dry. No rash or lesion  "noted. No pallor.   Peripheral vascular:  Pulses in all 4 extremities with no clubbing, no cyanosis, no edema.  Psychiatric: Appropriate mood and affect, pleasant.   ----------------------------------------------------------------------------------------------------------------------  WBC/HGB/HCT/PLT   12.00/11.8/38.0/536 (03/05 0102)  BUN/CREAT/GLUC/ALT/AST/CRISTINO/LIP    20/0.71/127/--/--/--/-- (03/05 0102)  LYTES - Na/K/Cl/CO2: 137/4.1/101/25.3 (03/05 0102)        No results found for: \"URINECX\"  No results found for: \"BLOODCX\"    I have personally looked at the labs and they are summarized above.  ----------------------------------------------------------------------------------------------------------------------  Detailed radiology reports for the last 24 hours:  No radiology results for the last day  Assessment & Plan      Left hip fracture  Status post ORIF    -Patient with minimal pain, discontinue all analgesics    -Status post orthopedic surgery consultation and subsequent surgical fixation    -Continue PT and OT, patient with poor participation with PT today with  present at bedside.    -Patient denied for subacute rehab at [facility], patient has expedited appeal going at this time.  Follow-up with .    E. coli UTI    -Continue Omnicef to completion    Alzheimer's dementia without agitation    -Continue supportive care    Suspected LLL pneumonia, gram-negative bacterial    -Completed course of azithromycin and currently on Omnicef      Copied text in portions of the note has been reviewed and is accurate as of 03/05/25    VTE Prophylaxis:     Active VTE Prophylaxis  Pharmacologic:        Start     Dose Route Frequency Stop    02/24/25 0900  Enoxaparin Sodium (LOVENOX) syringe 40 mg         40 mg SC Every 24 Hours --                  Mechanical:        Start        02/23/25 0436  Maintain Sequential Compression Device  Continuous        Comments: RIGHT LEG ONLY.                      "     Disposition pending clinical course and insurance appeal    Nelson Rodriguez DO  [facility] Hospitalist  25  19:25 EST      Electronically signed by Nelson Rodriguez DO at 25 1928          Physical Therapy Notes (most recent note)        Barbara De La Cruz, PT at 25 1113  Version 1 of 1         Acute Care - Physical Therapy Treatment Note   [facility]     Patient Name: Crista Blunt  : 1940  MRN: [id number]  Today's Date: 3/6/2025   Onset of Illness/Injury or Date of Surgery: 25  Visit Dx:     ICD-10-CM ICD-9-CM   1. Closed fracture of neck of left femur, initial encounter  S72.002A 820.8   2. Closed fracture of left hip, initial encounter  S72.002A 820.8     Patient Active Problem List   Diagnosis    Hip fracture     History reviewed. No pertinent past medical history.  Past Surgical History:   Procedure Laterality Date    HIP BIPOLAR REPLACEMENT Left 2025    Procedure: Left hip lexi arthroplasty;  Surgeon: Perry Cobb MD;  Location: [facility];  Service: Orthopedics;  Laterality: Left;     PT Assessment (Last 12 Hours)       PT Evaluation and Treatment       Row Name 25 0910          Physical Therapy Time and Intention    Document Type therapy note (daily note)  -TALI     Mode of Treatment physical therapy  -TALI     Patient Effort poor  -TALI     Comment Pt seen for treatment this AM, pt seemingly more alert at beginning, however pt was resistive to activity, encouraged to participate therefore depA to sit EOB and to stand x2 to assist with bed change. Pt encouraged and education provided on benefit of mobility and goal to go home. D/t pt cognition, pt limited in mobility and learning at this time. Time taken conversing with  as he expresses he feels his wife is cognitively worse being in house as she is not resting fully.  is aware he could not care for her at home at this time.  -TALI       Row Name 25 0910          General Information     Limitations/Impairments safety/cognitive  -       Row Name 03/06/25 0910          Bed Mobility    Bed Mobility sit-supine;supine-sit  -KH     Scooting/Bridging [place] (Bed Mobility) dependent (less than 25% patient effort);2 person assist  -     Supine-Sit [place] (Bed Mobility) dependent (less than 25% patient effort);2 person assist  -       Row Name 03/06/25 0910          Transfers    Transfers sit-stand transfer;stand-sit transfer  -       Row Name 03/06/25 0910          Sit-Stand Transfer    Sit-Stand [place] (Transfers) dependent (less than 25% patient effort);2 person assist  -       Row Name 03/06/25 0910          Stand-Sit Transfer    Stand-Sit [place] (Transfers) dependent (less than 25% patient effort);2 person assist  -       Row Name 03/06/25 0910          Gait/Stairs (Locomotion)    Patient was able to Ambulate no, other medical factors prevent ambulation  -     Reason Patient was unable to Ambulate Cognitive Deficit/Severe Dementia  -       Row Name 03/06/25 0910          Motor Skills    Therapeutic Exercise other (see comments)  Pt able to perform some ankle PF, R hip flexion appreciated x1 (trace); encouraged hip add with tactile and verbal cues, knee flexion/ext, pt inability to mobilize R LE on all commands.  -       Row Name             Wound 02/23/25 0959 Left anterior thigh    Wound - Properties Group Placement Date: 02/23/25  -JG Placement Time: 0959 -JG Side: Left  -JG Orientation: anterior  -JG Location: thigh  -JG    Retired Wound - Properties Group Placement Date: 02/23/25  -JG Placement Time: 0959 -JG Side: Left  -JG Orientation: anterior  -JG Location: thigh  -JG    Retired Wound - Properties Group Placement Date: 02/23/25  -JG Placement Time: 0959 -JG Side: Left  -JG Orientation: anterior  -JG Location: thigh  -JG    Retired Wound - Properties Group Date first assessed: 02/23/25  -JG Time first assessed: 0959 -JG Side: Left  -JG Location: thigh   -JDODIE      Row Name 03/06/25 0910          Plan of Care Review    Outcome Evaluation Pt seen for treatment this date, poor participation 2/2 cognition/dementia/confusion.  states she was able to partially participate with LE Mobility yesterday afternoon. This AM pt lethargic, confused, poorly participatory with depA for mobility. Prognosis guarded to poor, recommend extended care/SNF.  -TALI       Row Name 03/06/25 0910          Positioning and Restraints    Pre-Treatment Position in bed  -     Post Treatment Position bed  -KH     In Bed supine;with family/caregiver  -               User Key  (r) = Recorded By, (t) = Taken By, (c) = Cosigned By      Initials Name Provider Type    Kristin Avilez, RN Registered Nurse    Barbara Galarza, PT Physical Therapist                    Physical Therapy Education        No education to display                  PT Recommendation and Plan  Therapy Frequency (PT): other (see comments) (1-5x/week, PRN)  Progress Summary (PT)  Daily Progress Summary (PT): Pt participation poor to fair, limited 2/2 cognition. Pt has shown minimal to no progress with therapy, as confusion limits motor planning/coordination/following commands. Recommend extended care facility  Progress: no change  Outcome Evaluation: Pt seen for treatment this date, poor participation 2/2 cognition/dementia/confusion.  states she was able to partially participate with LE Mobility yesterday afternoon. This AM pt lethargic, confused, poorly participatory with depA for mobility. Prognosis guarded to poor, recommend extended care/SNF.       Time Calculation:    PT Charges       Row Name 03/06/25 1113             Time Calculation    Start Time 0910  -      PT Received On 03/06/25  -         Time Calculation- PT    Total Timed Code Minutes- PT 15 minute(s)  -                User Key  (r) = Recorded By, (t) = Taken By, (c) = Cosigned By      Initials Name Provider Type    Barbara Galarza, HAYLEE  Physical Therapist                  Therapy Charges for Today       Code Description Service Date Service Provider Modifiers Qty    59615354050 HC PT THERAPEUTIC ACT EA 15 MIN 3/6/2025 Barbara De La Cruz, PT GP 1            PT G-Codes  AM-PAC 6 Clicks Score (PT): 6    Barbara De La Cruz, PT  3/6/2025      Electronically signed by Barbara De La Cruz, PT at 25 1114          Occupational Therapy Notes (most recent note)        Genoveva lAarcon, OT at 25 1104          Acute Care - Occupational Therapy Treatment Note   [place]     Patient Name: Crista Blunt  : 1940  MRN: [id number]  Today's Date: 3/3/2025  Onset of Illness/Injury or Date of Surgery: 25     Referring Physician: Jordyn    Admit Date: 2025       ICD-10-CM ICD-9-CM   1. Closed fracture of neck of left femur, initial encounter  S72.002A 820.8   2. Closed fracture of left hip, initial encounter  S72.002A 820.8     Patient Active Problem List   Diagnosis    Hip fracture     History reviewed. No pertinent past medical history.  Past Surgical History:   Procedure Laterality Date    HIP BIPOLAR REPLACEMENT Left 2025    Procedure: Left hip lexi arthroplasty;  Surgeon: Perry Cobb MD;  Location: [facility];  Service: Orthopedics;  Laterality: Left;         OT ASSESSMENT FLOWSHEET (Last 12 Hours)       OT Evaluation and Treatment       Row Name 25 1031                   OT Time and Intention    Subjective Information no complaints  -LM        Document Type therapy note (daily note)  -LM        Mode of Treatment occupational therapy  -LM        Patient Effort poor  -LM        Comment Patient seen this am for adl retraining/education and fxl mobility.  patient currently requires total assist with all bathing, dressing, toileting, grooming, and feeding tasks.  unable to follow commands consistently.  Disoriented x 3.  Recommend 24/7 assist upon discharge.  -LM           General Information    Existing Precautions/Restrictions hip,  anterior;fall  -LM        Limitations/Impairments safety/cognitive  -LM           Cognition    Affect/Mental Status (Cognition) confused  -LM        Orientation Status (Cognition) disoriented to;person;place;situation  -LM           Bathing Assessment/Intervention    [place] Level (Bathing) dependent (less than 25% patient effort)  -LM           Upper Body Dressing Assessment/Training    [place] Level (Upper Body Dressing) dependent (less than 25% patient effort)  -LM           Lower Body Dressing Assessment/Training    [place] Level (Lower Body Dressing) dependent (less than 25% patient effort)  -LM           Grooming Assessment/Training    [place] Level (Grooming) dependent (less than 25% patient effort)  -LM           Toileting Assessment/Training    [place] Level (Toileting) dependent (less than 25% patient effort)  -LM           Transfer Assessment/Treatment    Comment, (Transfers) deferred  -LM           Motor Skills    Functional Endurance poor  -LM           Wound 02/23/25 0959 Left anterior thigh    Wound - Properties Group Placement Date: 02/23/25  -JG Placement Time: 0959  -JG Side: Left  -JG Orientation: anterior  -JG Location: thigh  -JG    Retired Wound - Properties Group Placement Date: 02/23/25  -JG Placement Time: 0959  -JG Side: Left  -JG Orientation: anterior  -JG Location: thigh  -JG    Retired Wound - Properties Group Placement Date: 02/23/25  -JG Placement Time: 0959  -JG Side: Left  -JG Orientation: anterior  -JG Location: thigh  -JG    Retired Wound - Properties Group Date first assessed: 02/23/25  -JG Time first assessed: 0959  -JG Side: Left  -JG Location: thigh  -JG       Positioning and Restraints    Post Treatment Position bed  -LM        In Bed call light within reach;encouraged to call for assist;exit alarm on  -LM           Therapy Plan Review/Discharge Plan (OT)    Anticipated Discharge Disposition (OT) skilled nursing facility;extended care facility  -LM                   User Key  (r) = Recorded By, (t) = Taken By, (c) = Cosigned By      Initials Name Effective Dates    Kristin Avilez RN 08/29/23 -     LM Genoveva Alarcon OT 06/16/21 -                      Occupational Therapy Education        No education to display                      OT Recommendation and Plan              Time Calculation:     Therapy Charges for Today       Code Description Service Date Service Provider Modifiers Qty    71184629178 HC OT SELF CARE/MGMT/TRAIN EA 15 MIN 3/3/2025 Genoveva Alarcon OT GO 2                 Genoveva Alarcon OT  3/3/2025    Electronically signed by Genoveva Alarcon OT at 03/03/25 1104       Speech Language Pathology Notes (most recent note)    No notes exist for this encounter.       ADL Documentation (most recent)      Flowsheet Row Most Recent Value   Transferring 3 - assistive equipment and person   Toileting 3 - assistive equipment and person   Bathing 2 - assistive person   Dressing 2 - assistive person   Eating 2 - assistive person   Communication 2 - difficulty speaking (not related to language barrier)   Swallowing 0 - swallows foods/liquids without difficulty   Equipment Currently Used at Home none            Discharge Summary    No notes of this type exist for this encounter.       Discharge Order (From admission, onward)      None

## 2025-03-06 NOTE — PLAN OF CARE
Goal Outcome Evaluation:   Pt resting calmly in bed at this time. Pt alert to self only. Pt seen by PT with RN at bedside, pt unable to ambulate. Incision dressing CDI. Pt has voiced no complaints at this time. No acute changes noted at this time. Will continue to follow plan of care.

## 2025-03-06 NOTE — PROGRESS NOTES
[facility] HOSPITALIST PROGRESS NOTE     Patient Identification:  Name:  Crista Blunt  Age:  84 y.o.  Sex:  female  :  1940  MRN:  [id number]  Visit Number:  [id number]  ROOM: [address] [place] [address]     Primary Care Provider:  Abby Mckenzie DO    Length of stay in inpatient status:  10    Subjective     Chief Compliant:    Chief Complaint   Patient presents with    Fall       History of Presenting Illness: Patient seen and evaluated in follow-up for left hip fracture status post mechanical fall requiring ORIF.  Patient with significant history of Alzheimer's dementia with reduced interaction here in hospital and involvement in therapy.  Patient with no acute complaints at time of evaluation today.  Patient appeal of insurance denial for SNF rehab currently still pending.  Patient  reports being notified case to be reviewed 3/6/2025.    Objective     Current Hospital Meds:  docusate sodium, 100 mg, Oral, BID  enoxaparin sodium, 40 mg, Subcutaneous, Q24H  melatonin, 5 mg, Oral, Nightly  sodium chloride, 10 mL, Intravenous, Q12H         ----------------------------------------------------------------------------------------------------------------------  Vital Signs:  Temp:  [97.6 °F (36.4 °C)-98.5 °F (36.9 °C)] 98.5 °F (36.9 °C)  Heart Rate:  [89-97] 89  Resp:  [16-18] 16  BP: (112-134)/(52-76) 123/62  SpO2:  [92 %-98 %] 98 %  on   ;   Device (Oxygen Therapy): room air  Body mass index is 22.82 kg/m².      Intake/Output Summary (Last 24 hours) at 3/5/2025 1925  Last data filed at 3/5/2025 1843  Gross per 24 hour   Intake 740 ml   Output 1300 ml   Net -560 ml      ----------------------------------------------------------------------------------------------------------------------  Physical exam:  Constitutional: Pleasant elderly adult female resting in bed in no distress  HENT:  Head:  Normocephalic and atraumatic.  Mouth:  Moist mucous membranes.    Eyes:  Conjunctivae and EOM are  "normal. No scleral icterus.      Cardiovascular:  Normal rate, regular rhythm and normal heart sounds with no murmur.  Pulmonary/Chest:  No respiratory distress, no wheezes, no crackles, with normal breath sounds and good air movement.  Abdominal:  Soft.  Bowel sounds are normal.  No distension and no tenderness.   Musculoskeletal: Tenderness to left hip, postop dressing.  No red or swollen joints anywhere.    Neurological:  Alert and responds to name but is not oriented to place or time or situation.  No cranial nerve deficit.  No tongue deviation.  No facial droop.  No slurred speech. Intact Sensation throughout  Skin:  Skin is warm and dry. No rash or lesion noted. No pallor.   Peripheral vascular:  Pulses in all 4 extremities with no clubbing, no cyanosis, no edema.  Psychiatric: Appropriate mood and affect, pleasant.   ----------------------------------------------------------------------------------------------------------------------  WBC/HGB/HCT/PLT   12.00/11.8/38.0/536 (03/05 0102)  BUN/CREAT/GLUC/ALT/AST/CRISTINO/LIP    20/0.71/127/--/--/--/-- (03/05 0102)  LYTES - Na/K/Cl/CO2: 137/4.1/101/25.3 (03/05 0102)        No results found for: \"URINECX\"  No results found for: \"BLOODCX\"    I have personally looked at the labs and they are summarized above.  ----------------------------------------------------------------------------------------------------------------------  Detailed radiology reports for the last 24 hours:  No radiology results for the last day  Assessment & Plan      Left hip fracture  Status post ORIF    -Patient with minimal pain, discontinue all analgesics    -Status post orthopedic surgery consultation and subsequent surgical fixation    -Continue PT and OT, patient with poor participation with PT today with  present at bedside.    -Patient denied for subacute rehab at [facility], patient has expedited appeal going at this time.  Follow-up with .    E. coli UTI    -Continue Omnicef " to completion    Alzheimer's dementia without agitation    -Continue supportive care    Suspected LLL pneumonia, gram-negative bacterial    -Completed course of azithromycin and currently on Omnicef      Copied text in portions of the note has been reviewed and is accurate as of 03/05/25    VTE Prophylaxis:     Active VTE Prophylaxis  Pharmacologic:        Start     Dose Route Frequency Stop    02/24/25 0900  Enoxaparin Sodium (LOVENOX) syringe 40 mg         40 mg SC Every 24 Hours --                  Mechanical:        Start        02/23/25 0436  Maintain Sequential Compression Device  Continuous        Comments: RIGHT LEG ONLY.                          Disposition pending clinical course and insurance appeal    Nelson Rodriguez DO  [facility] Hospitalist  03/05/25  19:25 EST

## 2025-03-06 NOTE — THERAPY TREATMENT NOTE
Acute Care - Physical Therapy Treatment Note   [facility]     Patient Name: Crista Blunt  : 1940  MRN: [id number]  Today's Date: 3/6/2025   Onset of Illness/Injury or Date of Surgery: 25  Visit Dx:     ICD-10-CM ICD-9-CM   1. Closed fracture of neck of left femur, initial encounter  S72.002A 820.8   2. Closed fracture of left hip, initial encounter  S72.002A 820.8     Patient Active Problem List   Diagnosis    Hip fracture     History reviewed. No pertinent past medical history.  Past Surgical History:   Procedure Laterality Date    HIP BIPOLAR REPLACEMENT Left 2025    Procedure: Left hip lexi arthroplasty;  Surgeon: Perry Cobb MD;  Location: [facility];  Service: Orthopedics;  Laterality: Left;     PT Assessment (Last 12 Hours)       PT Evaluation and Treatment       Row Name 25 0910          Physical Therapy Time and Intention    Document Type therapy note (daily note)  -     Mode of Treatment physical therapy  -     Patient Effort poor  -     Comment Pt seen for treatment this AM, pt seemingly more alert at beginning, however pt was resistive to activity, encouraged to participate therefore depA to sit EOB and to stand x2 to assist with bed change. Pt encouraged and education provided on benefit of mobility and goal to go home. D/t pt cognition, pt limited in mobility and learning at this time. Time taken conversing with  as he expresses he feels his wife is cognitively worse being in house as she is not resting fully.  is aware he could not care for her at home at this time.  -       Row Name 25 0910          General Information    Limitations/Impairments safety/cognitive  -       Row Name 25 0910          Bed Mobility    Bed Mobility sit-supine;supine-sit  -     Scooting/Bridging [place] (Bed Mobility) dependent (less than 25% patient effort);2 person assist  -     Supine-Sit [place] (Bed Mobility) dependent (less than 25% patient  effort);2 person assist  -       Row Name 03/06/25 0910          Transfers    Transfers sit-stand transfer;stand-sit transfer  -       Row Name 03/06/25 0910          Sit-Stand Transfer    Sit-Stand [place] (Transfers) dependent (less than 25% patient effort);2 person assist  -       Row Name 03/06/25 0910          Stand-Sit Transfer    Stand-Sit [place] (Transfers) dependent (less than 25% patient effort);2 person assist  -       Row Name 03/06/25 0910          Gait/Stairs (Locomotion)    Patient was able to Ambulate no, other medical factors prevent ambulation  -     Reason Patient was unable to Ambulate Cognitive Deficit/Severe Dementia  -       Row Name 03/06/25 0910          Motor Skills    Therapeutic Exercise other (see comments)  Pt able to perform some ankle PF, R hip flexion appreciated x1 (trace); encouraged hip add with tactile and verbal cues, knee flexion/ext, pt inability to mobilize R LE on all commands.  -       Row Name             Wound 02/23/25 0959 Left anterior thigh    Wound - Properties Group Placement Date: 02/23/25  -JG Placement Time: 0959 -JG Side: Left  -JG Orientation: anterior  -JG Location: thigh  -JG    Retired Wound - Properties Group Placement Date: 02/23/25  -JG Placement Time: 0959  -JG Side: Left  -JG Orientation: anterior  -JG Location: thigh  -JG    Retired Wound - Properties Group Placement Date: 02/23/25  -JG Placement Time: 0959  -JG Side: Left  -JG Orientation: anterior  -JG Location: thigh  -JG    Retired Wound - Properties Group Date first assessed: 02/23/25  -JG Time first assessed: 0959  -JG Side: Left  -JG Location: thigh  -JG      Row Name 03/06/25 0910          Plan of Care Review    Outcome Evaluation Pt seen for treatment this date, poor participation 2/2 cognition/dementia/confusion.  states she was able to partially participate with LE Mobility yesterday afternoon. This AM pt lethargic, confused, poorly participatory with depA for  mobility. Prognosis guarded to poor, recommend extended care/SNF.  -       Row Name 03/06/25 0910          Positioning and Restraints    Pre-Treatment Position in bed  -     Post Treatment Position bed  -     In Bed supine;with family/caregiver  -               User Key  (r) = Recorded By, (t) = Taken By, (c) = Cosigned By      Initials Name Provider Type    Kristin Avilez, RN Registered Nurse    Barbara Galarza, PT Physical Therapist                    Physical Therapy Education        No education to display                  PT Recommendation and Plan  Therapy Frequency (PT): other (see comments) (1-5x/week, PRN)  Progress Summary (PT)  Daily Progress Summary (PT): Pt participation poor to fair, limited 2/2 cognition. Pt has shown minimal to no progress with therapy, as confusion limits motor planning/coordination/following commands. Recommend extended care facility  Progress: no change  Outcome Evaluation: Pt seen for treatment this date, poor participation 2/2 cognition/dementia/confusion.  states she was able to partially participate with LE Mobility yesterday afternoon. This AM pt lethargic, confused, poorly participatory with depA for mobility. Prognosis guarded to poor, recommend extended care/SNF.       Time Calculation:    PT Charges       Row Name 03/06/25 1113             Time Calculation    Start Time 0910  -      PT Received On 03/06/25  -         Time Calculation- PT    Total Timed Code Minutes- PT 15 minute(s)  -                User Key  (r) = Recorded By, (t) = Taken By, (c) = Cosigned By      Initials Name Provider Type    Barbara Galarza, PT Physical Therapist                  Therapy Charges for Today       Code Description Service Date Service Provider Modifiers Qty    38964362223  PT THERAPEUTIC ACT EA 15 MIN 3/6/2025 Barbara De La Cruz, PT GP 1            PT G-Codes  AM-PAC 6 Clicks Score (PT): 6    Barbara De La Cruz PT  3/6/2025

## 2025-03-07 ENCOUNTER — READMISSION MANAGEMENT (OUTPATIENT)
Dept: CALL CENTER | Facility: HOSPITAL | Age: 85
End: 2025-03-07
Payer: MEDICARE

## 2025-03-07 VITALS
BODY MASS INDEX: 22.85 KG/M2 | SYSTOLIC BLOOD PRESSURE: 113 MMHG | HEIGHT: 65 IN | OXYGEN SATURATION: 98 % | WEIGHT: 137.13 LBS | RESPIRATION RATE: 18 BRPM | HEART RATE: 87 BPM | DIASTOLIC BLOOD PRESSURE: 62 MMHG | TEMPERATURE: 98.6 F

## 2025-03-07 PROCEDURE — 25010000002 ENOXAPARIN PER 10 MG: Performed by: GENERAL PRACTICE

## 2025-03-07 RX ORDER — LACTULOSE 10 G/15ML
30 SOLUTION ORAL EVERY 12 HOURS PRN
Qty: 237 ML | Refills: 0 | Status: SHIPPED | OUTPATIENT
Start: 2025-03-07

## 2025-03-07 RX ORDER — ENOXAPARIN SODIUM 100 MG/ML
40 INJECTION SUBCUTANEOUS EVERY 24 HOURS
Qty: 7.2 ML | Refills: 0 | Status: SHIPPED | OUTPATIENT
Start: 2025-03-08 | End: 2025-03-26

## 2025-03-07 RX ADMIN — DOCUSATE SODIUM 100 MG: 100 CAPSULE, LIQUID FILLED ORAL at 09:10

## 2025-03-07 RX ADMIN — Medication 10 ML: at 09:10

## 2025-03-07 RX ADMIN — ENOXAPARIN SODIUM 40 MG: 100 INJECTION SUBCUTANEOUS at 09:10

## 2025-03-07 RX ADMIN — ACETAMINOPHEN 650 MG: 650 SOLUTION ORAL at 09:15

## 2025-03-07 NOTE — PLAN OF CARE
Goal Outcome Evaluation:              Outcome Evaluation: Patient has rested in bed, alert, oriented to self only.  PRN medication given for nonverbal indicators of pain, RA, VSS, Will continue plan of care.

## 2025-03-07 NOTE — PROGRESS NOTES
[facility] HOSPITALIST PROGRESS NOTE     Patient Identification:  Name:  Crista Blunt  Age:  84 y.o.  Sex:  female  :  1940  MRN:  [id number]  Visit Number:  [id number]  ROOM: [address] [place] [address]     Primary Care Provider:  Abby Mckenzie DO    Length of stay in inpatient status:  11    Subjective     Chief Compliant:    Chief Complaint   Patient presents with    Fall       History of Presenting Illness: Patient seen and evaluated in follow-up for left hip fracture status post mechanical fall requiring ORIF.  Patient with significant history of Alzheimer's dementia with reduced interaction here in hospital and involvement in therapy.  Patient with no acute complaints at time of evaluation today.  Patient appeal of insurance denial for SNF rehab approved and currently working on awaiting accepting SNF facility.    Objective     Current Hospital Meds:  docusate sodium, 100 mg, Oral, BID  enoxaparin sodium, 40 mg, Subcutaneous, Q24H  melatonin, 5 mg, Oral, Nightly  sodium chloride, 10 mL, Intravenous, Q12H         ----------------------------------------------------------------------------------------------------------------------  Vital Signs:  Temp:  [97.1 °F (36.2 °C)-98.9 °F (37.2 °C)] 98.1 °F (36.7 °C)  Resp:  [16-18] 18  BP: (103-143)/(57-79) 127/75     on   ;   Device (Oxygen Therapy): room air  Body mass index is 22.82 kg/m².      Intake/Output Summary (Last 24 hours) at 3/6/2025 2102  Last data filed at 3/6/2025 1700  Gross per 24 hour   Intake 460 ml   Output 500 ml   Net -40 ml      ----------------------------------------------------------------------------------------------------------------------  Physical exam:  Constitutional: Pleasant elderly adult female resting in bed in no distress  HENT:  Head:  Normocephalic and atraumatic.  Mouth:  Moist mucous membranes.    Eyes:  Conjunctivae and EOM are normal. No scleral icterus.      Cardiovascular:  Normal rate, regular rhythm  "and normal heart sounds with no murmur.  Pulmonary/Chest:  No respiratory distress, no wheezes, no crackles, with normal breath sounds and good air movement.  Abdominal:  Soft.  Bowel sounds are normal.  No distension and no tenderness.   Musculoskeletal: Tenderness to left hip, postop dressing.  No red or swollen joints anywhere.    Neurological:  Alert and responds to name but is not oriented to place or time or situation.  No cranial nerve deficit.  No tongue deviation.  No facial droop.  No slurred speech. Intact Sensation throughout  Skin:  Skin is warm and dry. No rash or lesion noted. No pallor.   Peripheral vascular:  Pulses in all 4 extremities with no clubbing, no cyanosis, no edema.  Psychiatric: Appropriate mood and affect, pleasant.   ----------------------------------------------------------------------------------------------------------------------                 No results found for: \"URINECX\"  No results found for: \"BLOODCX\"    I have personally looked at the labs and they are summarized above.  ----------------------------------------------------------------------------------------------------------------------  Detailed radiology reports for the last 24 hours:  No radiology results for the last day  Assessment & Plan      Left hip fracture  Status post ORIF    -Patient with minimal pain, discontinue all analgesics    -Status post orthopedic surgery consultation and subsequent surgical fixation    -Continue PT and OT, patient with poor participation with PT today with  present at bedside.    -Patient denied for subacute rehab at [facility], patient has expedited appeal going at this time which was approved today.  Follow-up with  who are currently working on finding accepting facility..    E. coli UTI    -Continue Omnicef to completion    Alzheimer's dementia without agitation    -Continue supportive care    Suspected LLL pneumonia, gram-negative bacterial    -Completed course of " azithromycin and currently on Omnicef      Copied text in portions of the note has been reviewed and is accurate as of 03/06/25    VTE Prophylaxis:     Active VTE Prophylaxis  Pharmacologic:        Start     Dose Route Frequency Stop    02/24/25 0900  Enoxaparin Sodium (LOVENOX) syringe 40 mg         40 mg SC Every 24 Hours --                  Mechanical:        Start        02/23/25 0436  Maintain Sequential Compression Device  Continuous        Comments: RIGHT LEG ONLY.                          Disposition pending clinical course and SNF approval.    Nelson Rodriguez DO  [facility] Hospitalist  03/06/25  21:02 EST

## 2025-03-07 NOTE — DISCHARGE SUMMARY
[facility] HOSPITALISTS DISCHARGE SUMMARY    Patient Identification:  Name:  Crista Blunt  Age:  84 y.o.  Sex:  female  :  1940  MRN:  [id number]  Visit Number:  [id number]    Date of Admission: 2025  Date of Discharge:  3/7/2025     PCP: Abby Mckenzie, DO    DISCHARGE DIAGNOSIS  Left hip fracture  Status post ORIF  E. coli UTI  Alzheimer's dementia without agitation  LLL bacterial pneumonia, gram-negative     CONSULTS   Orthopedic surgery    PROCEDURES PERFORMED  Left femoral neck fracture, displaced    HOSPITAL COURSE  Patient is a 84 y.o. female presented to [facility] complaining of left hip pain after mechanical fall at home.  Please see the admitting history and physical for further details.      Subsequent evaluation in the emergency department revealed left hip fracture and orthopedic surgery consulted and agreeable to see and operated on and patient subsequently admitted to the hospitalist service for further stabilization and treatment.  Patient seen in follow-up consultation with orthopedic surgery and subsequently taken for ORIF of left femoral neck fracture which patient tolerated well.  Patient postoperatively doing well but poor progress with physical therapy and family agreeable to short-term SNF rehab however originally denied by insurance but then through subsequent expedited appeal able to obtain approval and accepted to outside skilled nursing facility.  Patient with Alzheimer's dementia but without agitation or behavioral disturbances.  While in hospital concern for left lower lobe pneumonia and treated as gram-negative bacterial pneumonia as well as patient having evidence of E. coli UTI which was treated and completed antibiotic therapy while in hospital.  On day of discharge patient remained medically stable with accepting outside skilled nursing facility for continued short-term rehab and thus was felt to achieve maximal benefit of  continued inpatient hospitalization subsequently discharged.  She will follow-up with PCP and orthopedic surgery post fall.      VITAL SIGNS:  Temp:  [98.1 °F (36.7 °C)-98.7 °F (37.1 °C)] 98.6 °F (37 °C)  Heart Rate:  [97] 97  Resp:  [16-18] 18  BP: (103-159)/(59-82) 159/82     on   ;   Device (Oxygen Therapy): room air    Body mass index is 22.82 kg/m².  Wt Readings from Last 3 Encounters:   02/23/25 62.2 kg (137 lb 2 oz)   04/04/24 59 kg (130 lb)       PHYSICAL EXAM:  Constitutional: Pleasant elderly adult female resting in bed in no distress  HENT:  Head:  Normocephalic and atraumatic.  Mouth:  Moist mucous membranes.    Eyes:  Conjunctivae and EOM are normal. No scleral icterus.      Cardiovascular:  Normal rate, regular rhythm and normal heart sounds with no murmur.  Pulmonary/Chest:  No respiratory distress, no wheezes, no crackles, with normal breath sounds and good air movement.  Abdominal:  Soft.  Bowel sounds are normal.  No distension and no tenderness.   Musculoskeletal: Tenderness to left hip, postop dressing.  No red or swollen joints anywhere.    Neurological:  Alert and responds to name but is not oriented to place or time or situation.  No cranial nerve deficit.  No tongue deviation.  No facial droop.  No slurred speech. Intact Sensation throughout  Skin:  Skin is warm and dry. No rash or lesion noted. No pallor.   Peripheral vascular:  Pulses in all 4 extremities with no clubbing, no cyanosis, no edema.  Psychiatric: Appropriate mood and affect, pleasant.     DISCHARGE DISPOSITION   Stable    DISCHARGE MEDICATIONS:     Discharge Medications        New Medications        Instructions Start Date   enoxaparin sodium 40 MG/0.4ML solution prefilled syringe syringe  Commonly known as: LOVENOX   40 mg, Subcutaneous, Every 24 Hours   Start Date: March 8, 2025     lactulose 10 GM/15ML solution  Commonly known as: CHRONULAC   30 g, Oral, Every 12 Hours PRN      melatonin 5 MG tablet tablet   5 mg, Oral,  Nightly                 Additional Instructions for the Follow-ups that You Need to Schedule       Discharge Follow-up with PCP   As directed       Currently Documented PCP:    Abby Mckenzie DO    PCP Phone Number:    [phone]     Follow Up Details: 1 week post hospital follow up        Discharge Follow-up with Specialty: Orthopedic Surgery; 1 Week   As directed      Specialty: Orthopedic Surgery   Follow Up: 1 Week   Follow Up Details: 1-2 week follow up with Ortho               Follow-up Information       Abby Mckenzie DO .    Specialty: Family Medicine  Why: 1 week post hospital follow up  Contact information:  [address]  [facility] [address]  [phone]                              TEST  RESULTS PENDING AT DISCHARGE       CODE STATUS  Code Status and Medical Interventions: CPR (Attempt to Resuscitate); Limited Support; No intubation (DNI)   Ordered at: 02/23/25 1602     Medical Intervention Limits:    No intubation (DNI)     Code Status (Patient has no pulse and is not breathing):    CPR (Attempt to Resuscitate)     Medical Interventions (Patient has pulse or is breathing):    Limited Support       Nelson Rodriguez DO  [facility] Hospitalist  03/07/25  11:56 EST    Please note that this discharge summary required more than 30 minutes to complete.

## 2025-03-07 NOTE — CASE MANAGEMENT/SOCIAL WORK
Discharge Planning Assessment  [facility]     Patient Name: Crista Blunt  MRN: [id number]  Today's Date: 3/7/2025    Admit Date: 2/22/2025          Discharge Plan       Row Name 03/07/25 0908       Plan    Plan SS was notified by Stormy Montes per Mera that pt does not have a bed. SS received a call from [facility] admissions who states they had some questions about pt. SS discussed pt with provider. SS provided updated information to [facility].  to follow.    Addendum @1020: SS receive a call from [facility] admissions per Aura who states they have accepted pt and will be able to take pt. [facility] per Aura to visit pt and spouse at bedside to complete admission paperwork.     Addendum @1110: SS flipped [place] pharmacy ([facility], [place], [place]). SS contacted provider on this date to notify that pt can be discharged on this date.          Continued Care and Services - Admitted Since 2/22/2025       Destination       Service Provider Request Status Services Address Phone Fax Patient Preferred    [facility] -- [address] [facility] [address] [phone] --    [facility] Declined  Bed not available -- [address] [address] [phone] --    [facility] Declined  Insurance Denial, no long term care beds available. -- [address] [facility] [address] [phone] --          Expected Discharge Date and Time       Expected Discharge Date Expected Discharge Time    Mar 7, 2025              YULIYA Valdivia

## 2025-03-07 NOTE — CASE MANAGEMENT/SOCIAL WORK
Discharge Planning Assessment  [facility]     Patient Name: Crista Blunt  MRN: [id number]  Today's Date: 3/7/2025    Admit Date: 2/22/2025       Discharge Plan       Row Name 03/07/25 1213       Plan    Final Discharge Disposition Code 03 - skilled nursing facility (SNF)    Final Note Pt is being discharged to [facility] on this date. Pt's spouse aware of discharge. SS contacted pharmacy to notify of discharge. Pharmacy to notify SS when AVS is complete. PCS form filled out. EMS transportation to be arranged. No other needs identified at this time.    Addendum @ 1219: SS was notified by pharmacy that AVS is complete. SS notified [facility] admissions.     Addendum @ 1325: SS provided RN with number to call report.     Addendum @7238: SS contacted [facility] to arrange [facility].           Expected Discharge Date and Time       Expected Discharge Date Expected Discharge Time    Mar 7, 2025        YULIYA Valdivia

## 2025-03-07 NOTE — OUTREACH NOTE
Prep Survey      Flowsheet Row Responses   [facility] discharged from? [facility]   Is LACE score < 7 ? No   Eligibility Not Eligible   What are the reasons patient is not eligible? [facility]   Does the patient have one of the following disease processes/diagnoses(primary or secondary)? Other   Prep survey completed? Yes            Lori JOHN - Registered Nurse

## 2025-03-07 NOTE — DISCHARGE INSTR - ACTIVITY
Activity as tolerated  Weight Bearing as Tolerated LLE    Wound care: Maintain dressing, may reinforce as needed for saturation.

## 2025-03-10 NOTE — DISCHARGE PLACEMENT REQUEST
"Crista Connors (84 y.o. Female)       Date of Birth   1940    Social Security Number       Address   [facility] [address] [facility] [address]    Home Phone   [phone]    MRN   [id number]       [facility]    Marital Status                               Admission Date   2025    Admission Type   Emergency    Admitting Provider   Heriberto Arteaga MD    Attending Provider       Department, Room/Bed   [address], [address]/       Discharge Date   3/7/2025    Discharge Disposition   Home or Self Care    Discharge Destination                                 Attending Provider: (none)   Allergies: Sulfa Antibiotics    Isolation: None   Infection: None   Code Status: Prior    Ht: 165.1 cm (65\")   Wt: 62.2 kg (137 lb 2 oz)    Admission Cmt: None   Principal Problem: Hip fracture [S72.009A]                   Active Insurance as of 2025       Primary Coverage       Payor Plan Insurance Group Employer/Plan Group    HU[employer] MEDICARE REPLACEMENT [employer] MEDICARE ADVANTAGE GROUP [id number]       Payor Plan Address Payor Plan Phone Number Payor Plan Fax Number Effective Dates    [address] [address] [phone]  2019 - None Entered    [facility] [address]         Subscriber Name Subscriber Birth Date Member ID       CRISTA CONNORS 1940 [id number]                     Emergency Contacts        (Rel.) Home Phone Work Phone Mobile Phone    Donavon Connors (Spouse) [phone] -- --                 Discharge Summary        Nelson Rodriguez DO at [address] [place], [place] [address]ISTS DISCHARGE SUMMARY    Patient Identification:  Name:  Crsita Connors  Age:  84 y.o.  Sex:  female  :  1940  MRN:  [id number]  Visit Number:  [id number]    Date of Admission: 2025  Date of Discharge:  3/7/2025     PCP: Abby Mckenzie DO    DISCHARGE DIAGNOSIS  Left hip fracture  Status post ORIF  E. coli UTI  Alzheimer's dementia without agitation  LLL " bacterial pneumonia, gram-negative     CONSULTS   Orthopedic surgery    PROCEDURES PERFORMED  Left femoral neck fracture, displaced    HOSPITAL COURSE  Patient is a 84 y.o. female presented to [facility] complaining of left hip pain after mechanical fall at home.  Please see the admitting history and physical for further details.      Subsequent evaluation in the emergency department revealed left hip fracture and orthopedic surgery consulted and agreeable to see and operated on and patient subsequently admitted to the hospitalist service for further stabilization and treatment.  Patient seen in follow-up consultation with orthopedic surgery and subsequently taken for ORIF of left femoral neck fracture which patient tolerated well.  Patient postoperatively doing well but poor progress with physical therapy and family agreeable to short-term SNF rehab however originally denied by insurance but then through subsequent expedited appeal able to obtain approval and accepted to outside skilled nursing facility.  Patient with Alzheimer's dementia but without agitation or behavioral disturbances.  While in hospital concern for left lower lobe pneumonia and treated as gram-negative bacterial pneumonia as well as patient having evidence of E. coli UTI which was treated and completed antibiotic therapy while in hospital.  On day of discharge patient remained medically stable with accepting outside skilled nursing facility for continued short-term rehab and thus was felt to achieve maximal benefit of continued inpatient hospitalization subsequently discharged.  She will follow-up with PCP and orthopedic surgery post fall.      VITAL SIGNS:  Temp:  [98.1 °F (36.7 °C)-98.7 °F (37.1 °C)] 98.6 °F (37 °C)  Heart Rate:  [97] 97  Resp:  [16-18] 18  BP: (103-159)/(59-82) 159/82     on   ;   Device (Oxygen Therapy): room air    Body mass index is 22.82 kg/m².  Wt Readings from Last 3 Encounters:   02/23/25 62.2 kg (137 lb 2  oz)   04/04/24 59 kg (130 lb)       PHYSICAL EXAM:  Constitutional: Pleasant elderly adult female resting in bed in no distress  HENT:  Head:  Normocephalic and atraumatic.  Mouth:  Moist mucous membranes.    Eyes:  Conjunctivae and EOM are normal. No scleral icterus.      Cardiovascular:  Normal rate, regular rhythm and normal heart sounds with no murmur.  Pulmonary/Chest:  No respiratory distress, no wheezes, no crackles, with normal breath sounds and good air movement.  Abdominal:  Soft.  Bowel sounds are normal.  No distension and no tenderness.   Musculoskeletal: Tenderness to left hip, postop dressing.  No red or swollen joints anywhere.    Neurological:  Alert and responds to name but is not oriented to place or time or situation.  No cranial nerve deficit.  No tongue deviation.  No facial droop.  No slurred speech. Intact Sensation throughout  Skin:  Skin is warm and dry. No rash or lesion noted. No pallor.   Peripheral vascular:  Pulses in all 4 extremities with no clubbing, no cyanosis, no edema.  Psychiatric: Appropriate mood and affect, pleasant.     DISCHARGE DISPOSITION   Stable    DISCHARGE MEDICATIONS:     Discharge Medications        New Medications        Instructions Start Date   enoxaparin sodium 40 MG/0.4ML solution prefilled syringe syringe  Commonly known as: LOVENOX   40 mg, Subcutaneous, Every 24 Hours   Start Date: March 8, 2025     lactulose 10 GM/15ML solution  Commonly known as: CHRONULAC   30 g, Oral, Every 12 Hours PRN      melatonin 5 MG tablet tablet   5 mg, Oral, Nightly                 Additional Instructions for the Follow-ups that You Need to Schedule       Discharge Follow-up with PCP   As directed       Currently Documented PCP:    Abby Mckenzie DO    PCP Phone Number:    [phone]     Follow Up Details: 1 week post hospital follow up        Discharge Follow-up with Specialty: Orthopedic Surgery; 1 Week   As directed      Specialty: Orthopedic Surgery    Follow Up: 1 Week   Follow Up Details: 1-2 week follow up with Ortho               Follow-up Information       Abby Mckenzie DO .    Specialty: Family Medicine  Why: 1 week post hospital follow up  Contact information:  [address] [place] [address] [phone]                              TEST  RESULTS PENDING AT DISCHARGE       CODE STATUS  Code Status and Medical Interventions: CPR (Attempt to Resuscitate); Limited Support; No intubation (DNI)   Ordered at: 02/23/25 1602     Medical Intervention Limits:    No intubation (DNI)     Code Status (Patient has no pulse and is not breathing):    CPR (Attempt to Resuscitate)     Medical Interventions (Patient has pulse or is breathing):    Limited Support       Nelson Rodriguez DO  [facility]  03/07/25  11:56 EST    Please note that this discharge summary required more than 30 minutes to complete.    Electronically signed by Nelson Rodriguez DO at 03/09/25 2003

## 2025-05-19 ENCOUNTER — TRANSCRIBE ORDERS (OUTPATIENT)
Dept: ADMINISTRATIVE | Facility: HOSPITAL | Age: 85
End: 2025-05-19
Payer: MEDICARE

## 2025-05-19 DIAGNOSIS — M25.462 SWELLING OF LEFT KNEE JOINT: Primary | ICD-10-CM

## 2025-05-23 ENCOUNTER — HOSPITAL ENCOUNTER (OUTPATIENT)
Dept: ULTRASOUND IMAGING | Facility: HOSPITAL | Age: 85
Discharge: HOME | End: 2025-05-23
Payer: MEDICARE

## 2025-05-23 DIAGNOSIS — M25.462 SWELLING OF LEFT KNEE JOINT: ICD-10-CM

## 2025-05-23 PROCEDURE — 76882 US LMTD JT/FCL EVL NVASC XTR: CPT

## 2025-07-23 DIAGNOSIS — G30.1 SEVERE LATE ONSET ALZHEIMER'S DEMENTIA, UNSPECIFIED WHETHER BEHAVIORAL, PSYCHOTIC, OR MOOD DISTURBANCE OR ANXIETY: Primary | ICD-10-CM

## 2025-07-23 DIAGNOSIS — F02.C0 SEVERE LATE ONSET ALZHEIMER'S DEMENTIA, UNSPECIFIED WHETHER BEHAVIORAL, PSYCHOTIC, OR MOOD DISTURBANCE OR ANXIETY: Primary | ICD-10-CM

## 2025-07-23 RX ORDER — LORAZEPAM 2 MG/ML
1 CONCENTRATE ORAL EVERY 4 HOURS PRN
Qty: 30 ML | Refills: 0 | Status: SHIPPED | OUTPATIENT
Start: 2025-07-23

## 2025-07-23 RX ORDER — MORPHINE SULFATE 100 MG/5ML
5 SOLUTION ORAL EVERY 4 HOURS PRN
Qty: 30 ML | Refills: 0 | Status: SHIPPED | OUTPATIENT
Start: 2025-07-23

## (undated) DEVICE — BNDG ELAS CO-FLEX SLF ADHR 4IN5YD LF STRL

## (undated) DEVICE — SUT VIC 0 CP1 27IN J267H

## (undated) DEVICE — PROXIMATE RH ROTATING HEAD SKIN STAPLERS (35 WIDE) CONTAINS 35 STAINLESS STEEL STAPLES: Brand: PROXIMATE

## (undated) DEVICE — GOWN,SIRUS,NONRNF,SETINSLV,2XL,18/CS: Brand: MEDLINE

## (undated) DEVICE — TP SXN YANKR FLANG STRL

## (undated) DEVICE — NDL REV W/NITNL LP C13 .5CIR 36.6MM

## (undated) DEVICE — NEEDLE, QUINCKE, 18GX3.5": Brand: MEDLINE

## (undated) DEVICE — BOWL AND CEMENT CARTRIDGE WITH BREAKAWAY FEMORAL NOZZLE AND MEDIUM PRESSURIZER: Brand: ACM

## (undated) DEVICE — PK PRSTH HIP 70

## (undated) DEVICE — SUT MNCRYL PLS ANTIB UD 2/0 CP1 27IN

## (undated) DEVICE — MODULAR CATHCART TAPERED SPACER 12/14 TAPER -3MM
Type: IMPLANTABLE DEVICE | Site: HIP | Status: NON-FUNCTIONAL
Removed: 2025-02-23

## (undated) DEVICE — GLV SURG TRIUMPH MICRO PF LTX 8.5 STRL

## (undated) DEVICE — OPTIFOAM GENTLE AG,POST-OP STRIP,3.5X10: Brand: MEDLINE

## (undated) DEVICE — HOLDER: Brand: DEROYAL

## (undated) DEVICE — ELECTRD BLD EZ CLN MOD 4IN

## (undated) DEVICE — ANTIBACTERIAL UNDYED BRAIDED (POLYGLACTIN 910), SYNTHETIC ABSORBABLE SUTURE: Brand: COATED VICRYL

## (undated) DEVICE — PENCL SMOKE/EVAC MEGADYNE TELESCP 10FT

## (undated) DEVICE — OPTIFOAM GENTLE AG,POST-OP STRIP,3.5X14: Brand: MEDLINE

## (undated) DEVICE — 2108 SERIES SAGITTAL BLADE (18.6 X 0.8 X 73.8MM)